# Patient Record
Sex: MALE | Race: WHITE | NOT HISPANIC OR LATINO | Employment: OTHER | URBAN - METROPOLITAN AREA
[De-identification: names, ages, dates, MRNs, and addresses within clinical notes are randomized per-mention and may not be internally consistent; named-entity substitution may affect disease eponyms.]

---

## 2021-07-04 ENCOUNTER — APPOINTMENT (OUTPATIENT)
Dept: GENERAL RADIOLOGY | Facility: HOSPITAL | Age: 61
End: 2021-07-04

## 2021-07-04 ENCOUNTER — HOSPITAL ENCOUNTER (INPATIENT)
Facility: HOSPITAL | Age: 61
LOS: 3 days | Discharge: HOME | End: 2021-07-07
Attending: EMERGENCY MEDICINE | Admitting: INTERNAL MEDICINE

## 2021-07-04 ENCOUNTER — APPOINTMENT (OUTPATIENT)
Dept: CARDIOLOGY | Facility: HOSPITAL | Age: 61
End: 2021-07-04

## 2021-07-04 DIAGNOSIS — I25.119 CORONARY ARTERY DISEASE INVOLVING NATIVE CORONARY ARTERY OF NATIVE HEART WITH ANGINA PECTORIS (HCC): ICD-10-CM

## 2021-07-04 DIAGNOSIS — I21.29 ACUTE LATERAL MYOCARDIAL INFARCTION (HCC): Primary | ICD-10-CM

## 2021-07-04 PROBLEM — F41.9 ANXIETY: Status: ACTIVE | Noted: 2021-07-04

## 2021-07-04 PROBLEM — F43.10 PTSD (POST-TRAUMATIC STRESS DISORDER): Status: ACTIVE | Noted: 2021-07-04

## 2021-07-04 LAB
ACT BLD: 147 SECONDS (ref 89–137)
ACT BLD: 158 SECONDS (ref 89–137)
ACT BLD: 169 SECONDS (ref 89–137)
ACT BLD: 197 SECONDS (ref 89–137)
ACT BLD: 224 SECONDS (ref 89–137)
ALBUMIN SERPL-MCNC: 4.1 G/DL (ref 3.5–5.2)
ALBUMIN/GLOB SERPL: 1.6 G/DL
ALP SERPL-CCNC: 67 U/L (ref 39–117)
ALT SERPL W P-5'-P-CCNC: 13 U/L (ref 1–41)
ANION GAP SERPL CALCULATED.3IONS-SCNC: 7 MMOL/L (ref 5–15)
AST SERPL-CCNC: 18 U/L (ref 1–40)
B PARAPERT DNA SPEC QL NAA+PROBE: NOT DETECTED
B PERT DNA SPEC QL NAA+PROBE: NOT DETECTED
BILIRUB SERPL-MCNC: 0.4 MG/DL (ref 0–1.2)
BUN SERPL-MCNC: 13 MG/DL (ref 6–20)
BUN/CREAT SERPL: 16.3 (ref 7–25)
C PNEUM DNA NPH QL NAA+NON-PROBE: NOT DETECTED
CALCIUM SPEC-SCNC: 8.6 MG/DL (ref 8.6–10.5)
CHLORIDE SERPL-SCNC: 103 MMOL/L (ref 98–107)
CHOLEST SERPL-MCNC: 196 MG/DL (ref 0–200)
CO2 SERPL-SCNC: 26 MMOL/L (ref 22–29)
CREAT SERPL-MCNC: 0.8 MG/DL (ref 0.76–1.27)
DEPRECATED RDW RBC AUTO: 45.1 FL (ref 37–54)
DEPRECATED RDW RBC AUTO: 45.5 FL (ref 37–54)
ERYTHROCYTE [DISTWIDTH] IN BLOOD BY AUTOMATED COUNT: 13.7 % (ref 12.3–15.4)
ERYTHROCYTE [DISTWIDTH] IN BLOOD BY AUTOMATED COUNT: 13.7 % (ref 12.3–15.4)
FLUAV SUBTYP SPEC NAA+PROBE: NOT DETECTED
FLUBV RNA ISLT QL NAA+PROBE: NOT DETECTED
GFR SERPL CREATININE-BSD FRML MDRD: 99 ML/MIN/1.73
GLOBULIN UR ELPH-MCNC: 2.5 GM/DL
GLUCOSE BLDC GLUCOMTR-MCNC: 111 MG/DL (ref 70–105)
GLUCOSE BLDC GLUCOMTR-MCNC: 125 MG/DL (ref 70–105)
GLUCOSE BLDC GLUCOMTR-MCNC: 127 MG/DL (ref 70–105)
GLUCOSE BLDC GLUCOMTR-MCNC: 128 MG/DL (ref 70–105)
GLUCOSE BLDC GLUCOMTR-MCNC: 132 MG/DL (ref 70–105)
GLUCOSE SERPL-MCNC: 136 MG/DL (ref 65–99)
HADV DNA SPEC NAA+PROBE: NOT DETECTED
HCOV 229E RNA SPEC QL NAA+PROBE: NOT DETECTED
HCOV HKU1 RNA SPEC QL NAA+PROBE: NOT DETECTED
HCOV NL63 RNA SPEC QL NAA+PROBE: NOT DETECTED
HCOV OC43 RNA SPEC QL NAA+PROBE: NOT DETECTED
HCT VFR BLD AUTO: 39 % (ref 37.5–51)
HCT VFR BLD AUTO: 40.5 % (ref 37.5–51)
HDLC SERPL-MCNC: 49 MG/DL (ref 40–60)
HGB BLD-MCNC: 13.1 G/DL (ref 13–17.7)
HGB BLD-MCNC: 13.6 G/DL (ref 13–17.7)
HMPV RNA NPH QL NAA+NON-PROBE: NOT DETECTED
HOLD SPECIMEN: NORMAL
HOLD SPECIMEN: NORMAL
HPIV1 RNA SPEC QL NAA+PROBE: NOT DETECTED
HPIV2 RNA SPEC QL NAA+PROBE: NOT DETECTED
HPIV3 RNA NPH QL NAA+PROBE: NOT DETECTED
HPIV4 P GENE NPH QL NAA+PROBE: NOT DETECTED
LDLC SERPL CALC-MCNC: 132 MG/DL (ref 0–100)
LDLC/HDLC SERPL: 2.67 {RATIO}
M PNEUMO IGG SER IA-ACNC: NOT DETECTED
MCH RBC QN AUTO: 31.5 PG (ref 26.6–33)
MCH RBC QN AUTO: 31.8 PG (ref 26.6–33)
MCHC RBC AUTO-ENTMCNC: 33.6 G/DL (ref 31.5–35.7)
MCHC RBC AUTO-ENTMCNC: 33.7 G/DL (ref 31.5–35.7)
MCV RBC AUTO: 93.4 FL (ref 79–97)
MCV RBC AUTO: 94.5 FL (ref 79–97)
MRSA DNA SPEC QL NAA+PROBE: NORMAL
PLATELET # BLD AUTO: 217 10*3/MM3 (ref 140–450)
PLATELET # BLD AUTO: 251 10*3/MM3 (ref 140–450)
PMV BLD AUTO: 7.5 FL (ref 6–12)
PMV BLD AUTO: 7.7 FL (ref 6–12)
POTASSIUM SERPL-SCNC: 4 MMOL/L (ref 3.5–5.2)
PROT SERPL-MCNC: 6.6 G/DL (ref 6–8.5)
QT INTERVAL: 482 MS
RBC # BLD AUTO: 4.18 10*6/MM3 (ref 4.14–5.8)
RBC # BLD AUTO: 4.29 10*6/MM3 (ref 4.14–5.8)
RHINOVIRUS RNA SPEC NAA+PROBE: NOT DETECTED
RSV RNA NPH QL NAA+NON-PROBE: NOT DETECTED
SARS-COV-2 RNA NPH QL NAA+NON-PROBE: NOT DETECTED
SODIUM SERPL-SCNC: 136 MMOL/L (ref 136–145)
TRIGL SERPL-MCNC: 81 MG/DL (ref 0–150)
TROPONIN T SERPL-MCNC: 0.19 NG/ML (ref 0–0.03)
TROPONIN T SERPL-MCNC: 0.61 NG/ML (ref 0–0.03)
TROPONIN T SERPL-MCNC: 1.6 NG/ML (ref 0–0.03)
VLDLC SERPL-MCNC: 15 MG/DL (ref 5–40)
WBC # BLD AUTO: 10.7 10*3/MM3 (ref 3.4–10.8)
WBC # BLD AUTO: 6.5 10*3/MM3 (ref 3.4–10.8)
WHOLE BLOOD HOLD SPECIMEN: NORMAL

## 2021-07-04 PROCEDURE — 93306 TTE W/DOPPLER COMPLETE: CPT | Performed by: INTERNAL MEDICINE

## 2021-07-04 PROCEDURE — C1874 STENT, COATED/COV W/DEL SYS: HCPCS | Performed by: INTERNAL MEDICINE

## 2021-07-04 PROCEDURE — C1894 INTRO/SHEATH, NON-LASER: HCPCS | Performed by: INTERNAL MEDICINE

## 2021-07-04 PROCEDURE — 85347 COAGULATION TIME ACTIVATED: CPT

## 2021-07-04 PROCEDURE — 25010000002 MORPHINE PER 10 MG: Performed by: INTERNAL MEDICINE

## 2021-07-04 PROCEDURE — 92941 PRQ TRLML REVSC TOT OCCL AMI: CPT | Performed by: INTERNAL MEDICINE

## 2021-07-04 PROCEDURE — 25010000002 FENTANYL CITRATE (PF) 50 MCG/ML SOLUTION: Performed by: INTERNAL MEDICINE

## 2021-07-04 PROCEDURE — 71045 X-RAY EXAM CHEST 1 VIEW: CPT

## 2021-07-04 PROCEDURE — 0202U NFCT DS 22 TRGT SARS-COV-2: CPT | Performed by: EMERGENCY MEDICINE

## 2021-07-04 PROCEDURE — 25010000002 HEPARIN (PORCINE) PER 1000 UNITS: Performed by: INTERNAL MEDICINE

## 2021-07-04 PROCEDURE — 25010000002 EPTIFIBATIDE 20 MG/10ML SOLUTION: Performed by: EMERGENCY MEDICINE

## 2021-07-04 PROCEDURE — C1725 CATH, TRANSLUMIN NON-LASER: HCPCS | Performed by: INTERNAL MEDICINE

## 2021-07-04 PROCEDURE — C1887 CATHETER, GUIDING: HCPCS | Performed by: INTERNAL MEDICINE

## 2021-07-04 PROCEDURE — 93458 L HRT ARTERY/VENTRICLE ANGIO: CPT | Performed by: INTERNAL MEDICINE

## 2021-07-04 PROCEDURE — 25010000002 EPTIFIBATIDE PER 5 MG: Performed by: EMERGENCY MEDICINE

## 2021-07-04 PROCEDURE — 80061 LIPID PANEL: CPT | Performed by: INTERNAL MEDICINE

## 2021-07-04 PROCEDURE — 25010000002 FENTANYL CITRATE (PF) 100 MCG/2ML SOLUTION: Performed by: INTERNAL MEDICINE

## 2021-07-04 PROCEDURE — 93005 ELECTROCARDIOGRAM TRACING: CPT | Performed by: INTERNAL MEDICINE

## 2021-07-04 PROCEDURE — 25010000002 FENTANYL CITRATE (PF) 50 MCG/ML SOLUTION: Performed by: EMERGENCY MEDICINE

## 2021-07-04 PROCEDURE — C1769 GUIDE WIRE: HCPCS | Performed by: INTERNAL MEDICINE

## 2021-07-04 PROCEDURE — 87641 MR-STAPH DNA AMP PROBE: CPT | Performed by: NURSE PRACTITIONER

## 2021-07-04 PROCEDURE — 84484 ASSAY OF TROPONIN QUANT: CPT | Performed by: INTERNAL MEDICINE

## 2021-07-04 PROCEDURE — 99254 IP/OBS CNSLTJ NEW/EST MOD 60: CPT | Performed by: INTERNAL MEDICINE

## 2021-07-04 PROCEDURE — 25010000002 MIDAZOLAM PER 1 MG: Performed by: INTERNAL MEDICINE

## 2021-07-04 PROCEDURE — 99152 MOD SED SAME PHYS/QHP 5/>YRS: CPT | Performed by: INTERNAL MEDICINE

## 2021-07-04 PROCEDURE — 3E033PZ INTRODUCTION OF PLATELET INHIBITOR INTO PERIPHERAL VEIN, PERCUTANEOUS APPROACH: ICD-10-PCS | Performed by: INTERNAL MEDICINE

## 2021-07-04 PROCEDURE — C9606 PERC D-E COR REVASC W AMI S: HCPCS | Performed by: INTERNAL MEDICINE

## 2021-07-04 PROCEDURE — 80053 COMPREHEN METABOLIC PANEL: CPT | Performed by: INTERNAL MEDICINE

## 2021-07-04 PROCEDURE — 25010000002 DOPAMINE PER 40 MG: Performed by: INTERNAL MEDICINE

## 2021-07-04 PROCEDURE — 82962 GLUCOSE BLOOD TEST: CPT

## 2021-07-04 PROCEDURE — 93005 ELECTROCARDIOGRAM TRACING: CPT | Performed by: EMERGENCY MEDICINE

## 2021-07-04 PROCEDURE — B2151ZZ FLUOROSCOPY OF LEFT HEART USING LOW OSMOLAR CONTRAST: ICD-10-PCS | Performed by: INTERNAL MEDICINE

## 2021-07-04 PROCEDURE — B2111ZZ FLUOROSCOPY OF MULTIPLE CORONARY ARTERIES USING LOW OSMOLAR CONTRAST: ICD-10-PCS | Performed by: INTERNAL MEDICINE

## 2021-07-04 PROCEDURE — 4A023N7 MEASUREMENT OF CARDIAC SAMPLING AND PRESSURE, LEFT HEART, PERCUTANEOUS APPROACH: ICD-10-PCS | Performed by: INTERNAL MEDICINE

## 2021-07-04 PROCEDURE — 93005 ELECTROCARDIOGRAM TRACING: CPT

## 2021-07-04 PROCEDURE — 93306 TTE W/DOPPLER COMPLETE: CPT

## 2021-07-04 PROCEDURE — 0 IOPAMIDOL PER 1 ML: Performed by: INTERNAL MEDICINE

## 2021-07-04 PROCEDURE — 85027 COMPLETE CBC AUTOMATED: CPT | Performed by: INTERNAL MEDICINE

## 2021-07-04 PROCEDURE — 99284 EMERGENCY DEPT VISIT MOD MDM: CPT

## 2021-07-04 PROCEDURE — 027034Z DILATION OF CORONARY ARTERY, ONE ARTERY WITH DRUG-ELUTING INTRALUMINAL DEVICE, PERCUTANEOUS APPROACH: ICD-10-PCS | Performed by: INTERNAL MEDICINE

## 2021-07-04 PROCEDURE — 93005 ELECTROCARDIOGRAM TRACING: CPT | Performed by: NURSE PRACTITIONER

## 2021-07-04 PROCEDURE — 25010000002 HEPARIN (PORCINE) PER 1000 UNITS: Performed by: EMERGENCY MEDICINE

## 2021-07-04 PROCEDURE — 94799 UNLISTED PULMONARY SVC/PX: CPT

## 2021-07-04 DEVICE — XIENCE SIERRA™ EVEROLIMUS ELUTING CORONARY STENT SYSTEM 2.50 MM X 23 MM / RAPID-EXCHANGE
Type: IMPLANTABLE DEVICE | Status: FUNCTIONAL
Brand: XIENCE SIERRA™

## 2021-07-04 RX ORDER — CLONAZEPAM 0.5 MG/1
0.5 TABLET ORAL 2 TIMES DAILY PRN
Status: DISCONTINUED | OUTPATIENT
Start: 2021-07-04 | End: 2021-07-07 | Stop reason: HOSPADM

## 2021-07-04 RX ORDER — NITROGLYCERIN 20 MG/100ML
INJECTION INTRAVENOUS
Status: COMPLETED | OUTPATIENT
Start: 2021-07-04 | End: 2021-07-04

## 2021-07-04 RX ORDER — MORPHINE SULFATE 4 MG/ML
2 INJECTION, SOLUTION INTRAMUSCULAR; INTRAVENOUS EVERY 4 HOURS PRN
Status: DISCONTINUED | OUTPATIENT
Start: 2021-07-04 | End: 2021-07-04

## 2021-07-04 RX ORDER — MIDAZOLAM HYDROCHLORIDE 1 MG/ML
INJECTION INTRAMUSCULAR; INTRAVENOUS AS NEEDED
Status: DISCONTINUED | OUTPATIENT
Start: 2021-07-04 | End: 2021-07-04 | Stop reason: HOSPADM

## 2021-07-04 RX ORDER — LIDOCAINE HYDROCHLORIDE 20 MG/ML
INJECTION, SOLUTION INFILTRATION; PERINEURAL AS NEEDED
Status: DISCONTINUED | OUTPATIENT
Start: 2021-07-04 | End: 2021-07-04 | Stop reason: HOSPADM

## 2021-07-04 RX ORDER — ASPIRIN 81 MG/1
81 TABLET ORAL DAILY
Status: DISCONTINUED | OUTPATIENT
Start: 2021-07-04 | End: 2021-07-07 | Stop reason: HOSPADM

## 2021-07-04 RX ORDER — NITROGLYCERIN 5 MG/ML
INJECTION, SOLUTION INTRAVENOUS AS NEEDED
Status: DISCONTINUED | OUTPATIENT
Start: 2021-07-04 | End: 2021-07-04 | Stop reason: HOSPADM

## 2021-07-04 RX ORDER — ONDANSETRON 4 MG/1
4 TABLET, FILM COATED ORAL EVERY 6 HOURS PRN
Status: DISCONTINUED | OUTPATIENT
Start: 2021-07-04 | End: 2021-07-07 | Stop reason: HOSPADM

## 2021-07-04 RX ORDER — CLOPIDOGREL BISULFATE 75 MG/1
75 TABLET ORAL DAILY
Status: DISCONTINUED | OUTPATIENT
Start: 2021-07-05 | End: 2021-07-07 | Stop reason: HOSPADM

## 2021-07-04 RX ORDER — MORPHINE SULFATE 4 MG/ML
1 INJECTION, SOLUTION INTRAMUSCULAR; INTRAVENOUS EVERY 4 HOURS PRN
Status: DISCONTINUED | OUTPATIENT
Start: 2021-07-04 | End: 2021-07-07 | Stop reason: HOSPADM

## 2021-07-04 RX ORDER — SODIUM CHLORIDE 0.9 % (FLUSH) 0.9 %
10 SYRINGE (ML) INJECTION AS NEEDED
Status: DISCONTINUED | OUTPATIENT
Start: 2021-07-04 | End: 2021-07-07 | Stop reason: HOSPADM

## 2021-07-04 RX ORDER — ESCITALOPRAM OXALATE 20 MG/1
20 TABLET ORAL DAILY
COMMUNITY
End: 2021-11-18

## 2021-07-04 RX ORDER — ALUMINA, MAGNESIA, AND SIMETHICONE 2400; 2400; 240 MG/30ML; MG/30ML; MG/30ML
15 SUSPENSION ORAL EVERY 6 HOURS PRN
Status: DISCONTINUED | OUTPATIENT
Start: 2021-07-04 | End: 2021-07-07 | Stop reason: HOSPADM

## 2021-07-04 RX ORDER — DOPAMINE HYDROCHLORIDE 160 MG/100ML
INJECTION, SOLUTION INTRAVENOUS CONTINUOUS PRN
Status: COMPLETED | OUTPATIENT
Start: 2021-07-04 | End: 2021-07-04

## 2021-07-04 RX ORDER — ONDANSETRON 2 MG/ML
4 INJECTION INTRAMUSCULAR; INTRAVENOUS EVERY 6 HOURS PRN
Status: DISCONTINUED | OUTPATIENT
Start: 2021-07-04 | End: 2021-07-04 | Stop reason: SDUPTHER

## 2021-07-04 RX ORDER — ASPIRIN 325 MG
TABLET ORAL AS NEEDED
Status: DISCONTINUED | OUTPATIENT
Start: 2021-07-04 | End: 2021-07-04 | Stop reason: HOSPADM

## 2021-07-04 RX ORDER — MORPHINE SULFATE 4 MG/ML
2 INJECTION, SOLUTION INTRAMUSCULAR; INTRAVENOUS EVERY 4 HOURS PRN
Status: DISCONTINUED | OUTPATIENT
Start: 2021-07-04 | End: 2021-07-07 | Stop reason: HOSPADM

## 2021-07-04 RX ORDER — EPTIFIBATIDE 20 MG/10ML
INJECTION INTRAVENOUS
Status: COMPLETED | OUTPATIENT
Start: 2021-07-04 | End: 2021-07-04

## 2021-07-04 RX ORDER — ESCITALOPRAM OXALATE 10 MG/1
20 TABLET ORAL DAILY
Status: DISCONTINUED | OUTPATIENT
Start: 2021-07-04 | End: 2021-07-07 | Stop reason: HOSPADM

## 2021-07-04 RX ORDER — ACETAMINOPHEN 650 MG/1
650 SUPPOSITORY RECTAL EVERY 4 HOURS PRN
Status: DISCONTINUED | OUTPATIENT
Start: 2021-07-04 | End: 2021-07-07 | Stop reason: HOSPADM

## 2021-07-04 RX ORDER — FENTANYL CITRATE 50 UG/ML
INJECTION, SOLUTION INTRAMUSCULAR; INTRAVENOUS AS NEEDED
Status: DISCONTINUED | OUTPATIENT
Start: 2021-07-04 | End: 2021-07-04 | Stop reason: HOSPADM

## 2021-07-04 RX ORDER — ACETAMINOPHEN 325 MG/1
650 TABLET ORAL EVERY 4 HOURS PRN
Status: DISCONTINUED | OUTPATIENT
Start: 2021-07-04 | End: 2021-07-07 | Stop reason: HOSPADM

## 2021-07-04 RX ORDER — EPTIFIBATIDE 0.75 MG/ML
INJECTION, SOLUTION INTRAVENOUS
Status: COMPLETED | OUTPATIENT
Start: 2021-07-04 | End: 2021-07-04

## 2021-07-04 RX ORDER — FENTANYL CITRATE 50 UG/ML
25 INJECTION, SOLUTION INTRAMUSCULAR; INTRAVENOUS
Status: DISCONTINUED | OUTPATIENT
Start: 2021-07-04 | End: 2021-07-07 | Stop reason: HOSPADM

## 2021-07-04 RX ORDER — SODIUM CHLORIDE 9 MG/ML
250 INJECTION, SOLUTION INTRAVENOUS ONCE AS NEEDED
Status: DISCONTINUED | OUTPATIENT
Start: 2021-07-04 | End: 2021-07-07 | Stop reason: HOSPADM

## 2021-07-04 RX ORDER — FENTANYL CITRATE 50 UG/ML
50 INJECTION, SOLUTION INTRAMUSCULAR; INTRAVENOUS ONCE
Status: COMPLETED | OUTPATIENT
Start: 2021-07-04 | End: 2021-07-04

## 2021-07-04 RX ORDER — ONDANSETRON 2 MG/ML
4 INJECTION INTRAMUSCULAR; INTRAVENOUS EVERY 6 HOURS PRN
Status: DISCONTINUED | OUTPATIENT
Start: 2021-07-04 | End: 2021-07-07 | Stop reason: HOSPADM

## 2021-07-04 RX ORDER — HEPARIN SODIUM 5000 [USP'U]/ML
INJECTION, SOLUTION INTRAVENOUS; SUBCUTANEOUS
Status: COMPLETED | OUTPATIENT
Start: 2021-07-04 | End: 2021-07-04

## 2021-07-04 RX ORDER — SODIUM CHLORIDE 0.9 % (FLUSH) 0.9 %
10 SYRINGE (ML) INJECTION EVERY 12 HOURS SCHEDULED
Status: DISCONTINUED | OUTPATIENT
Start: 2021-07-04 | End: 2021-07-07 | Stop reason: HOSPADM

## 2021-07-04 RX ORDER — HEPARIN SODIUM 1000 [USP'U]/ML
INJECTION, SOLUTION INTRAVENOUS; SUBCUTANEOUS AS NEEDED
Status: DISCONTINUED | OUTPATIENT
Start: 2021-07-04 | End: 2021-07-04 | Stop reason: HOSPADM

## 2021-07-04 RX ORDER — CLOPIDOGREL BISULFATE 75 MG/1
TABLET ORAL AS NEEDED
Status: DISCONTINUED | OUTPATIENT
Start: 2021-07-04 | End: 2021-07-04 | Stop reason: HOSPADM

## 2021-07-04 RX ORDER — ONDANSETRON 4 MG/1
4 TABLET, FILM COATED ORAL EVERY 6 HOURS PRN
Status: DISCONTINUED | OUTPATIENT
Start: 2021-07-04 | End: 2021-07-04 | Stop reason: SDUPTHER

## 2021-07-04 RX ORDER — CLONAZEPAM 0.5 MG/1
0.5 TABLET ORAL 2 TIMES DAILY PRN
COMMUNITY
End: 2021-07-14

## 2021-07-04 RX ADMIN — FENTANYL CITRATE 50 MCG: 50 INJECTION, SOLUTION INTRAMUSCULAR; INTRAVENOUS at 01:09

## 2021-07-04 RX ADMIN — ACETAMINOPHEN 650 MG: 325 TABLET, FILM COATED ORAL at 21:43

## 2021-07-04 RX ADMIN — ASPIRIN 81 MG: 81 TABLET, COATED ORAL at 10:11

## 2021-07-04 RX ADMIN — FENTANYL CITRATE 25 MCG: 50 INJECTION INTRAMUSCULAR; INTRAVENOUS at 15:17

## 2021-07-04 RX ADMIN — MORPHINE SULFATE 2 MG: 4 INJECTION INTRAVENOUS at 18:14

## 2021-07-04 RX ADMIN — MAGNESIUM HYDROXIDE,ALUMINUM HYDROXICE,SIMETHICONE 15 ML: 240; 2400; 2400 SUSPENSION ORAL at 07:28

## 2021-07-04 RX ADMIN — MORPHINE SULFATE 2 MG: 4 INJECTION INTRAVENOUS at 22:12

## 2021-07-04 RX ADMIN — FENTANYL CITRATE 25 MCG: 50 INJECTION INTRAMUSCULAR; INTRAVENOUS at 04:21

## 2021-07-04 RX ADMIN — MORPHINE SULFATE 2 MG: 4 INJECTION INTRAVENOUS at 07:22

## 2021-07-04 RX ADMIN — HEPARIN SODIUM 5000 UNITS: 5000 INJECTION INTRAVENOUS; SUBCUTANEOUS at 01:00

## 2021-07-04 RX ADMIN — FENTANYL CITRATE 25 MCG: 50 INJECTION INTRAMUSCULAR; INTRAVENOUS at 10:28

## 2021-07-04 RX ADMIN — NITROGLYCERIN 5 MCG/MIN: 20 INJECTION INTRAVENOUS at 00:56

## 2021-07-04 RX ADMIN — EPTIFIBATIDE 15516 MCG: 2 INJECTION, SOLUTION INTRAVENOUS at 01:11

## 2021-07-04 RX ADMIN — Medication 10 ML: at 10:28

## 2021-07-04 RX ADMIN — EPTIFIBATIDE 2 MCG/KG/MIN: 0.75 INJECTION INTRAVENOUS at 01:11

## 2021-07-04 RX ADMIN — ESCITALOPRAM OXALATE 20 MG: 10 TABLET ORAL at 10:11

## 2021-07-04 RX ADMIN — Medication 10 ML: at 21:42

## 2021-07-04 RX ADMIN — MORPHINE SULFATE 2 MG: 4 INJECTION INTRAVENOUS at 11:55

## 2021-07-04 RX ADMIN — SODIUM CHLORIDE 500 ML: 0.9 INJECTION, SOLUTION INTRAVENOUS at 01:06

## 2021-07-04 NOTE — H&P
PULMONARY/CRITICAL CARE HISTORY & PHYSICAL       PATIENT NAME:     Brayden Rainey  :     1960    MRN:     [id number]       ROOM:     [facility]     PRIMARY CARE PHYSICIAN:  Provider, No Known    SUBJECTIVE     CHIEF COMPLAINT:   Chest Pain    HISTORY OF PRESENT ILLNESS:  Brayden Rainey is a 60 y.o. male  has a past medical history of Anxiety (2021) and PTSD (post-traumatic stress disorder) (2021).   Patient presented to [facility] on 2021 with complaint of chest pain.  Patient stated that he was at [employer] earlier this evening, and had sudden onset of chest pain that radiated down his left arm and up his neck and jaw.  Patient was diaphoretic at the time.  Patient described his pain as crushing, with no precipitating or alleviating factors.  EMS was called, and EKG obtained showed acute STEMI.  Patient was brought to [facility] ER and Cath Lab was activated.  Patient was found to have an acute anterior lateral myocardial infarction.  Cardiac cath by Dr. Lai, PCI to LAD with drug-eluting stent completed, stenosis was decreased from 80 to 90% to less than 10%.  Patient had 90-95% occlusion of LAD and 70-80% occlusion of RCA.  Per Dr. Lai there were small diagonal branches at the origin of lesion of the LAD, that were not able to open.  EF was found to be 45%.  Patient became bradycardic during cardiac catheterization and given atropine and started on dopamine.     Patient was admitted to ICU after cardiac cath, arterial sheath was still in place at the time.  Patient denies shortness of air, palpitations, dizziness or syncope, headache, chills, or fever.  Denies abdominal pain, nausea, vomiting, diarrhea, constipation, loss of weight or loss of appetite, dysuria, blood in urine or stool.    CBC was obtained in ED which was unremarkable.    Pulmonary/Intensivist service was contacted for admission to ICU and further evaluation and treatment.      REVIEW OF  "SYSTEMS:  Pertinent items are noted in HPI, all other systems reviewed and negative      ASSESSMENT & PLAN     Principal Problem:    Acute lateral myocardial infarction (CMS/HCC)  Active Problems:    Anxiety    PTSD (post-traumatic stress disorder)     PLAN:  Acute lateral myocardial infarction  -S/p cardiac catheterization with Dr. Lai  -Arterial sheath still in place  -Monitor in ICU  -Reported bradycardia and cardiac Cath Lab  -On dopamine gtt  -Morphine and fentanyl for pain, per cardiology  -Started on aspirin and Plavix  -Started on metoprolol     Anxiety  PTSD  -Continue home Lexapro  -Continue home dose as needed Klonopin    Code Status: Full  VTE Prophylaxis: SCDs  PUD Prophylaxis: [facility] MEDICATIONS     SCHEDULED MEDICATIONS:  aspirin, 81 mg, Oral, Daily  atropine sulfate, , ,   [START ON 7/5/2021] clopidogrel, 75 mg, Oral, Daily  escitalopram, 20 mg, Oral, Daily  metoprolol tartrate, 12.5 mg, Oral, Q12H  sodium chloride, 10 mL, Intravenous, Q12H         CONTINUOUS INFUSIONS:          PRN MEDICATIONS:   •  acetaminophen **OR** acetaminophen  •  acetaminophen  •  aluminum-magnesium hydroxide-simethicone  •  atropine  •  clonazePAM  •  fentaNYL citrate (PF)  •  morphine **OR** Morphine  •  ondansetron **OR** ondansetron  •  ondansetron **OR** ondansetron  •  sodium chloride  •  sodium chloride  •  sodium chloride       OBJECTIVE     VITAL SIGNS:  /86   Pulse 55   Temp 97.7 °F (36.5 °C)   Resp 18   Ht 170.2 cm (67\")   Wt 86.2 kg (190 lb)   SpO2 100%   BMI 29.76 kg/m²     Wt Readings from Last 3 Encounters:   07/04/21 86.2 kg (190 lb)       INTAKE/OUTPUT:  No intake or output data in the 24 hours ending 07/04/21 0246    PHYSICAL EXAM:   Constitutional:  Well developed, well nourished, no acute distress, non-toxic appearance   Eyes:  PERRL, conjunctiva normal, EOMI   HENT:  Atraumatic, external ears normal, nose normal, oropharynx moist, no pharyngeal exudates. Neck-normal range of " motion, no tenderness  Respiratory: Clear to ascultation bilateral, non-labored respirations without accessory muscle use  Cardiovascular:  Normal rate, normal rhythm, no murmurs, no gallops, no rubs   GI:  Soft, nondistended, normal bowel sounds, nontender, no rebound or guarding   :  No costovertebral angle tenderness   Musculoskeletal:  No edema, no tenderness, no deformities, right arterial sheath noted  Integument:  Well hydrated, no rash   Neurologic:  Alert & oriented x 3, normal motor function, normal sensory function, no gross motor deficits noted   Psychiatric:  Speech and behavior appropriate    HISTORY     HISTORY:  No past medical history on file.  No past surgical history on file.  No family history on file.  Social History     Socioeconomic History   • Marital status: Single     Spouse name: Not on file   • Number of children: Not on file   • Years of education: Not on file   • Highest education level: Not on file        HOME MEDICATIONS:   Prior to Admission medications    Medication Sig Start Date End Date Taking? Authorizing Provider   clonazePAM (KlonoPIN) 0.5 MG tablet Take 0.5 mg by mouth 2 (Two) Times a Day As Needed.   Yes ProviderVicente MD   escitalopram (LEXAPRO) 20 MG tablet Take 20 mg by mouth Daily.   Yes ProviderVicente MD       IMMUNIZATIONS:    There is no immunization history on file for this patient.     ALLERGIES:  Patient has no known allergies.      RESULTS     LABS:  Lab Results (last 24 hours)     Procedure Component Value Units Date/Time    CBC (No Diff) [[id number]]  (Normal) Collected: 07/04/21 0104    Specimen: Blood Updated: 07/04/21 0235     WBC 6.50 10*3/mm3      RBC 4.29 10*6/mm3      Hemoglobin 13.6 g/dL      Hematocrit 40.5 %      MCV 94.5 fL      MCH 31.8 pg      MCHC 33.6 g/dL      RDW 13.7 %      RDW-SD 45.5 fl      MPV 7.7 fL      Platelets 251 10*3/mm3     COVID PRE-OP / PRE-PROCEDURE SCREENING ORDER (NO ISOLATION) - Swab, Nasopharynx [[id number]]   (Normal) Collected: 07/04/21 0109    Specimen: Swab from Nasopharynx Updated: 07/04/21 0208    Narrative:      The following orders were created for panel order COVID PRE-OP / PRE-PROCEDURE SCREENING ORDER (NO ISOLATION) - Swab, Nasopharynx.  Procedure                               Abnormality         Status                     ---------                               -----------         ------                     Respiratory Panel PCR w/...[[id number]]  Normal              Final result                 Please view results for these tests on the individual orders.    Respiratory Panel PCR w/COVID-19(SARS-CoV-2) JD/HAO/BLAINE/PAD/COR/MAD/ROGELIO In-House, NP Swab in UTM/VTM, 3-4 HR TAT - Swab, Nasopharynx [[id number]]  (Normal) Collected: 07/04/21 0109    Specimen: Swab from Nasopharynx Updated: 07/04/21 0208     ADENOVIRUS, PCR Not Detected     Coronavirus 229E Not Detected     Coronavirus HKU1 Not Detected     Coronavirus NL63 Not Detected     Coronavirus OC43 Not Detected     COVID19 Not Detected     Human Metapneumovirus Not Detected     Human Rhinovirus/Enterovirus Not Detected     Influenza A PCR Not Detected     Influenza B PCR Not Detected     Parainfluenza Virus 1 Not Detected     Parainfluenza Virus 2 Not Detected     Parainfluenza Virus 3 Not Detected     Parainfluenza Virus 4 Not Detected     RSV, PCR Not Detected     Bordetella pertussis pcr Not Detected     Bordetella parapertussis PCR Not Detected     Chlamydophila pneumoniae PCR Not Detected     Mycoplasma pneumo by PCR Not Detected    Narrative:      In the setting of a positive respiratory panel with a viral infection PLUS a negative procalcitonin without other underlying concern for bacterial infection, consider observing off antibiotics or discontinuation of antibiotics and continue supportive care. If the respiratory panel is positive for atypical bacterial infection (Bordetella pertussis, Chlamydophila pneumoniae, or Mycoplasma pneumoniae), consider  antibiotic de-escalation to target atypical bacterial infection.    POC Activated Clotting Time [[id number]]  (Abnormal) Collected: 07/04/21 0138    Specimen: Arterial Blood Updated: 07/04/21 0242     Activated Clotting Time  147 Seconds      Comment: Serial Number: [id number]:  [id number]       POC Activated Clotting Time [[id number]]  (Abnormal) Collected: 07/04/21 0158    Specimen: Arterial Blood Updated: 07/04/21 0243     Activated Clotting Time  224 Seconds      Comment: Serial Number: [id number]:  [id number]       POC Activated Clotting Time [[id number]]  (Abnormal) Collected: 07/04/21 0210    Specimen: Arterial Blood Updated: 07/04/21 0243     Activated Clotting Time  197 Seconds      Comment: Serial Number: [id number]:  [id number]               MICRO:  Microbiology Results (last 10 days)     Procedure Component Value - Date/Time    COVID PRE-OP / PRE-PROCEDURE SCREENING ORDER (NO ISOLATION) - Swab, Nasopharynx [[id number]]  (Normal) Collected: 07/04/21 0109    Lab Status: Final result Specimen: Swab from Nasopharynx Updated: 07/04/21 0208    Narrative:      The following orders were created for panel order COVID PRE-OP / PRE-PROCEDURE SCREENING ORDER (NO ISOLATION) - Swab, Nasopharynx.  Procedure                               Abnormality         Status                     ---------                               -----------         ------                     Respiratory Panel PCR w/...[[id number]]  Normal              Final result                 Please view results for these tests on the individual orders.    Respiratory Panel PCR w/COVID-19(SARS-CoV-2) JD/HAO/BLAINE/PAD/COR/MAD/ROGELIO In-House, NP Swab in UTM/VTM, 3-4 HR TAT - Swab, Nasopharynx [[id number]]  (Normal) Collected: 07/04/21 0109    Lab Status: Final result Specimen: Swab from Nasopharynx Updated: 07/04/21 0208     ADENOVIRUS, PCR Not Detected     Coronavirus 229E Not Detected     Coronavirus HKU1 Not Detected     Coronavirus NL63 Not Detected      Coronavirus OC43 Not Detected     COVID19 Not Detected     Human Metapneumovirus Not Detected     Human Rhinovirus/Enterovirus Not Detected     Influenza A PCR Not Detected     Influenza B PCR Not Detected     Parainfluenza Virus 1 Not Detected     Parainfluenza Virus 2 Not Detected     Parainfluenza Virus 3 Not Detected     Parainfluenza Virus 4 Not Detected     RSV, PCR Not Detected     Bordetella pertussis pcr Not Detected     Bordetella parapertussis PCR Not Detected     Chlamydophila pneumoniae PCR Not Detected     Mycoplasma pneumo by PCR Not Detected    Narrative:      In the setting of a positive respiratory panel with a viral infection PLUS a negative procalcitonin without other underlying concern for bacterial infection, consider observing off antibiotics or discontinuation of antibiotics and continue supportive care. If the respiratory panel is positive for atypical bacterial infection (Bordetella pertussis, Chlamydophila pneumoniae, or Mycoplasma pneumoniae), consider antibiotic de-escalation to target atypical bacterial infection.            RADIOLOGY STUDIES:  Imaging Results (Last 72 Hours)     ** No results found for the last 72 hours. **              I reviewed the patient's new clinical results.    I discussed the patient's findings and my recommendations with patient, nursing staff and consulting provider.  I have discussed plan with on-call attending physician, who agrees with this plan of care.    Appropriate PPE worn during assessment of patient per established guidelines.      Electronically signed by FRANCIA Lin, 07/04/21, 2:46 AM EDT.

## 2021-07-04 NOTE — CONSULTS
Referring Provider: Unknown    Attending: Virginia Kumar MD    Reason for Consultation:    Acute anterior and lateral myocardial infarction    Chief complaint:    Chest pain       History of present illness:     Patient is 60-year-old white gentleman whose past medical history is significant for anxiety disorder who is admitted with acute anterior and lateral myocardial infarction    Patient report that he was in [employer] having prayers.  He left the Judaism and was going to the parking lot when he started having chest pain.  Patient was taken to [facility] and he was noted to have ST elevation myocardial infarction involving the lateral leads.  There were reciprocal changes in inferior leads.  Patient was transferred to [facility] for further recommendation.    Patient did not have a previous history of CAD or PCI or MI.  Patient does not have diabetes mellitus.  No hypertension.  He does not smoke or abuse alcohol.    Review of Systems  Review of Systems   Constitutional: Negative for chills and fever.   HENT: Negative for ear discharge and nosebleeds.    Eyes: Negative for discharge and redness.   Cardiovascular: Positive for chest pain. Negative for orthopnea, palpitations, paroxysmal nocturnal dyspnea and syncope.   Respiratory: Positive for shortness of breath. Negative for cough and wheezing.    Endocrine: Negative for heat intolerance.   Skin: Negative for rash.   Musculoskeletal: Negative for arthritis and myalgias.   Gastrointestinal: Negative for abdominal pain, melena, nausea and vomiting.   Genitourinary: Negative for dysuria and hematuria.   Neurological: Negative for dizziness, light-headedness, numbness and tremors.   Psychiatric/Behavioral: Negative for depression. The patient is nervous/anxious.        Past Medical History  Past Medical History:   Diagnosis Date   • Anxiety 7/4/2021   • PTSD (post-traumatic stress disorder) 7/4/2021    and History reviewed. No pertinent  "surgical history.    Family History  History reviewed. No pertinent family history.    Social History  Social History     Socioeconomic History   • Marital status: Single     Spouse name: Not on file   • Number of children: Not on file   • Years of education: Not on file   • Highest education level: Not on file   Tobacco Use   • Smoking status: Never Smoker   • Smokeless tobacco: Never Used   Substance and Sexual Activity   • Alcohol use: Not Currently   • Drug use: Never       Objective     Physical Exam:    Vital Signs  Vitals:    07/04/21 1155 07/04/21 1200 07/04/21 1205 07/04/21 1235   BP: 127/76  122/76 133/77   Pulse: (!) 47  58 52   Resp:       Temp:  98 °F (36.7 °C)     TempSrc:  Oral     SpO2: 100%  96% 98%   Weight:       Height:           Weight  Flowsheet Rows      First Filed Value   Admission Height  170.2 cm (67\") Documented at 07/04/2021 0051   Admission Weight  86.2 kg (190 lb) Documented at 07/04/2021 0051           Constitutional:       Appearance: Well-developed.   Eyes:      General: No scleral icterus.        Right eye: No discharge.   HENT:      Head: Normocephalic and atraumatic.   Neck:      Thyroid: No thyromegaly.      Lymphadenopathy: No cervical adenopathy.   Pulmonary:      Effort: Pulmonary effort is normal. No respiratory distress.      Breath sounds: Normal breath sounds. No wheezing. No rales.   Cardiovascular:      Normal rate. Regular rhythm.      No gallop.   Abdominal:      Tenderness: There is no abdominal tenderness.   Skin:     Findings: No erythema or rash.   Neurological:      Mental Status: Alert and oriented to person, place, and time.         Results Review:  Lab Results (last 24 hours)     Procedure Component Value Units Date/Time    Troponin [[id number]]  (Abnormal) Collected: 07/04/21 1208    Specimen: Blood Updated: 07/04/21 1239     Troponin T 0.611 ng/mL     Narrative:      Troponin T Reference Range:  <= 0.03 ng/mL-   Negative for AMI  >0.03 ng/mL-     Abnormal for " myocardial necrosis.  Clinicians would have to utilize clinical acumen, EKG, Troponin and serial changes to determine if it is an Acute Myocardial Infarction or myocardial injury due to an underlying chronic condition.       Results may be falsely decreased if patient taking Biotin.      POC Glucose Once [[id number]]  (Abnormal) Collected: 07/04/21 1228    Specimen: Blood Updated: 07/04/21 1229     Glucose 128 mg/dL      Comment: Serial Number: [id number]:  [id number]       POC Glucose Once [[id number]]  (Abnormal) Collected: 07/04/21 0808    Specimen: Blood Updated: 07/04/21 0809     Glucose 132 mg/dL      Comment: Serial Number: [id number]:  [id number]       MRSA Screen, PCR (Inpatient) - Swab, Nares [[id number]]  (Normal) Collected: 07/04/21 0602    Specimen: Swab from Nares Updated: 07/04/21 0746     MRSA PCR No MRSA Detected    CBC (No Diff) [[id number]]  (Normal) Collected: 07/04/21 0419    Specimen: Blood Updated: 07/04/21 0453     WBC 10.70 10*3/mm3      Comment: Result checked         RBC 4.18 10*6/mm3      Hemoglobin 13.1 g/dL      Hematocrit 39.0 %      MCV 93.4 fL      MCH 31.5 pg      MCHC 33.7 g/dL      RDW 13.7 %      RDW-SD 45.1 fl      MPV 7.5 fL      Platelets 217 10*3/mm3     Troponin [[id number]]  (Abnormal) Collected: 07/04/21 0419    Specimen: Blood Updated: 07/04/21 0453     Troponin T 0.188 ng/mL     Narrative:      Troponin T Reference Range:  <= 0.03 ng/mL-   Negative for AMI  >0.03 ng/mL-     Abnormal for myocardial necrosis.  Clinicians would have to utilize clinical acumen, EKG, Troponin and serial changes to determine if it is an Acute Myocardial Infarction or myocardial injury due to an underlying chronic condition.       Results may be falsely decreased if patient taking Biotin.      Lipid Panel [[id number]]  (Abnormal) Collected: 07/04/21 0419    Specimen: Blood Updated: 07/04/21 0451     Total Cholesterol 196 mg/dL      Triglycerides 81 mg/dL      HDL Cholesterol 49 mg/dL       LDL Cholesterol  132 mg/dL      VLDL Cholesterol 15 mg/dL      LDL/HDL Ratio 2.67    Narrative:      Cholesterol Reference Ranges  (U.S. Department of Health and Human Services ATP III Classifications)    Desirable          <200 mg/dL  Borderline High    200-239 mg/dL  High Risk          >240 mg/dL      Triglyceride Reference Ranges  (U.S. Department of Health and Human Services ATP III Classifications)    Normal           <150 mg/dL  Borderline High  150-199 mg/dL  High             200-499 mg/dL  Very High        >500 mg/dL    HDL Reference Ranges  (U.S. Department of Health and Human Services ATP III Classifcations)    Low     <40 mg/dl (major risk factor for CHD)  High    >60 mg/dl ('negative' risk factor for CHD)        LDL Reference Ranges  (U.S. Department of Health and Human Services ATP III Classifcations)    Optimal          <100 mg/dL  Near Optimal     100-129 mg/dL  Borderline High  130-159 mg/dL  High             160-189 mg/dL  Very High        >189 mg/dL    Comprehensive Metabolic Panel [[id number]]  (Abnormal) Collected: 07/04/21 0419    Specimen: Blood Updated: 07/04/21 0451     Glucose 136 mg/dL      BUN 13 mg/dL      Creatinine 0.80 mg/dL      Sodium 136 mmol/L      Potassium 4.0 mmol/L      Chloride 103 mmol/L      CO2 26.0 mmol/L      Calcium 8.6 mg/dL      Total Protein 6.6 g/dL      Albumin 4.10 g/dL      ALT (SGPT) 13 U/L      AST (SGOT) 18 U/L      Alkaline Phosphatase 67 U/L      Total Bilirubin 0.4 mg/dL      eGFR Non African Amer 99 mL/min/1.73      Globulin 2.5 gm/dL      A/G Ratio 1.6 g/dL      BUN/Creatinine Ratio 16.3     Anion Gap 7.0 mmol/L     Narrative:      GFR Normal >60  Chronic Kidney Disease <60  Kidney Failure <15      POC Activated Clotting Time [[id number]]  (Abnormal) Collected: 07/04/21 0416    Specimen: Arterial Blood Updated: 07/04/21 0421     Activated Clotting Time  158 Seconds      Comment: Serial Number: [id number]:  [id number]       POC Activated Clotting Time  [[id number]]  (Abnormal) Collected: 07/04/21 0319    Specimen: Arterial Blood Updated: 07/04/21 0421     Activated Clotting Time  169 Seconds      Comment: Serial Number: [id number]:  [id number]       POC Glucose Once [[id number]]  (Abnormal) Collected: 07/04/21 0304    Specimen: Blood Updated: 07/04/21 0304     Glucose 111 mg/dL      Comment: Serial Number: [id number]:  [id number]       POC Activated Clotting Time [[id number]]  (Abnormal) Collected: 07/04/21 0210    Specimen: Arterial Blood Updated: 07/04/21 0243     Activated Clotting Time  197 Seconds      Comment: Serial Number: [id number]:  [id number]       POC Activated Clotting Time [[id number]]  (Abnormal) Collected: 07/04/21 0158    Specimen: Arterial Blood Updated: 07/04/21 0243     Activated Clotting Time  224 Seconds      Comment: Serial Number: [id number]:  [id number]       POC Activated Clotting Time [[id number]]  (Abnormal) Collected: 07/04/21 0138    Specimen: Arterial Blood Updated: 07/04/21 0242     Activated Clotting Time  147 Seconds      Comment: Serial Number: [id number]:  [id number]       CBC (No Diff) [[id number]]  (Normal) Collected: 07/04/21 0104    Specimen: Blood Updated: 07/04/21 0235     WBC 6.50 10*3/mm3      RBC 4.29 10*6/mm3      Hemoglobin 13.6 g/dL      Hematocrit 40.5 %      MCV 94.5 fL      MCH 31.8 pg      MCHC 33.6 g/dL      RDW 13.7 %      RDW-SD 45.5 fl      MPV 7.7 fL      Platelets 251 10*3/mm3     [place] Draw [[id number]] Collected: 07/04/21 0104    Specimen: Blood Updated: 07/04/21 0215    Narrative:      The following orders were created for panel order [place] Draw.  Procedure                               Abnormality         Status                     ---------                               -----------         ------                     Green Top (Gel)[[id number]]                                  Final result               Lavender Top[[id number]]                                     Final result               Gold Top -  SST[[id number]]                                   Final result                 Please view results for these tests on the individual orders.    Lavender Top [[id number]] Collected: 07/04/21 0104    Specimen: Blood Updated: 07/04/21 0215     Extra Tube hold for add-on     Comment: Auto resulted       Green Top (Gel) [[id number]] Collected: 07/04/21 0104    Specimen: Blood Updated: 07/04/21 0215     Extra Tube Hold for add-ons.     Comment: Auto resulted.       Gold Top - SST [[id number]] Collected: 07/04/21 0104    Specimen: Blood Updated: 07/04/21 0215     Extra Tube Hold for add-ons.     Comment: Auto resulted.       COVID PRE-OP / PRE-PROCEDURE SCREENING ORDER (NO ISOLATION) - Swab, Nasopharynx [[id number]]  (Normal) Collected: 07/04/21 0109    Specimen: Swab from Nasopharynx Updated: 07/04/21 0208    Narrative:      The following orders were created for panel order COVID PRE-OP / PRE-PROCEDURE SCREENING ORDER (NO ISOLATION) - Swab, Nasopharynx.  Procedure                               Abnormality         Status                     ---------                               -----------         ------                     Respiratory Panel PCR w/...[[id number]]  Normal              Final result                 Please view results for these tests on the individual orders.    Respiratory Panel PCR w/COVID-19(SARS-CoV-2) JD/HAO/BLAINE/PAD/COR/MAD/ROGELIO In-House, NP Swab in UTM/VTM, 3-4 HR TAT - Swab, Nasopharynx [[id number]]  (Normal) Collected: 07/04/21 0109    Specimen: Swab from Nasopharynx Updated: 07/04/21 0208     ADENOVIRUS, PCR Not Detected     Coronavirus 229E Not Detected     Coronavirus HKU1 Not Detected     Coronavirus NL63 Not Detected     Coronavirus OC43 Not Detected     COVID19 Not Detected     Human Metapneumovirus Not Detected     Human Rhinovirus/Enterovirus Not Detected     Influenza A PCR Not Detected     Influenza B PCR Not Detected     Parainfluenza Virus 1 Not Detected     Parainfluenza Virus 2 Not Detected      Parainfluenza Virus 3 Not Detected     Parainfluenza Virus 4 Not Detected     RSV, PCR Not Detected     Bordetella pertussis pcr Not Detected     Bordetella parapertussis PCR Not Detected     Chlamydophila pneumoniae PCR Not Detected     Mycoplasma pneumo by PCR Not Detected    Narrative:      In the setting of a positive respiratory panel with a viral infection PLUS a negative procalcitonin without other underlying concern for bacterial infection, consider observing off antibiotics or discontinuation of antibiotics and continue supportive care. If the respiratory panel is positive for atypical bacterial infection (Bordetella pertussis, Chlamydophila pneumoniae, or Mycoplasma pneumoniae), consider antibiotic de-escalation to target atypical bacterial infection.        Imaging Results (Last 72 Hours)     ** No results found for the last 72 hours. **        ECG 12 Lead   Preliminary Result   HEART RATE= 50  bpm   RR Interval= 1212  ms   NJ Interval= 149  ms   P Horizontal Axis= 26  deg   P Front Axis= 56  deg   QRSD Interval= 89  ms   QT Interval= 478  ms   QRS Axis= 56  deg   T Wave Axis= 61  deg   - ABNORMAL ECG -   Sinus bradycardia   Evolution of Ant-Lat MI continues since 04-Jul-2021 0:54:06   When compared with ECG of 04-Jul-2021 3:24:11,   Nonspecific significant change   Electronically Signed By:    Date and Time of Study: 2021-07-04 08:29:53      ECG 12 Lead   Preliminary Result   HEART RATE= 66  bpm   RR Interval= 912  ms   NJ Interval= 179  ms   P Horizontal Axis= 22  deg   P Front Axis= 82  deg   QRSD Interval= 88  ms   QT Interval= 414  ms   QRS Axis= 58  deg   T Wave Axis= 38  deg   - ABNORMAL ECG -   Sinus rhythm   Evolving anterolateral infarct since 04-Jul-2021 0:54:06   When compared with ECG of 04-Jul-2021 0:54:06,   Significant rate increase   New or worsened ischemia or infarction   Electronically Signed By:    Date and Time of Study: 2021-07-04 03:24:11      ECG 12 Lead   Final Result   HEART  RATE= 42  bpm   RR Interval= 1420  ms   MD Interval= 175  ms   P Horizontal Axis= 0  deg   P Front Axis= 86  deg   QRSD Interval= 92  ms   QT Interval= 482  ms   QRS Axis= 8  deg   T Wave Axis= 3  deg   - ABNORMAL ECG -   Sinus bradycardia   Lateral infarct, acute (LAD)   No previous ECG available for comparison   Electronically Signed By: Jack Christianson (BLAINE) 04-Jul-2021 08:21:12   Date and Time of Study: 2021-07-04 00:54:06        CBC    Results from last 7 days   Lab Units 07/04/21  0419 07/04/21  0104   WBC 10*3/mm3 10.70 6.50   HEMOGLOBIN g/dL 13.1 13.6   PLATELETS 10*3/mm3 217 251     BMP   Results from last 7 days   Lab Units 07/04/21  0419   SODIUM mmol/L 136   POTASSIUM mmol/L 4.0   CHLORIDE mmol/L 103   CO2 mmol/L 26.0   BUN mg/dL 13   CREATININE mg/dL 0.80   GLUCOSE mg/dL 136*     CMP   Results from last 7 days   Lab Units 07/04/21  0419   SODIUM mmol/L 136   POTASSIUM mmol/L 4.0   CHLORIDE mmol/L 103   CO2 mmol/L 26.0   BUN mg/dL 13   CREATININE mg/dL 0.80   GLUCOSE mg/dL 136*   ALBUMIN g/dL 4.10   BILIRUBIN mg/dL 0.4   ALK PHOS U/L 67   AST (SGOT) U/L 18   ALT (SGPT) U/L 13     Medication Review  Scheduled Meds:aspirin, 81 mg, Oral, Daily  atropine sulfate, , ,   [START ON 7/5/2021] clopidogrel, 75 mg, Oral, Daily  escitalopram, 20 mg, Oral, Daily  sodium chloride, 10 mL, Intravenous, Q12H      Continuous Infusions:   PRN Meds:.•  acetaminophen **OR** acetaminophen  •  acetaminophen  •  aluminum-magnesium hydroxide-simethicone  •  atropine  •  clonazePAM  •  fentaNYL citrate (PF)  •  morphine **OR** Morphine  •  ondansetron **OR** ondansetron  •  sodium chloride  •  sodium chloride  •  sodium chloride    Assessment:      Acute lateral myocardial infarction (CMS/HCC)    Anxiety    PTSD (post-traumatic stress disorder)        Plan:    MDM:    1.  Acute anterior ST elevation myocardial infarction:    Patient has been given aspirin in the emergency room.  Integrilin and heparin bolus has been given.  Patient  is feeling still chest pain.  I would recommend to proceed with urgent cardiac catheterization.    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  There was a small branch of LAD was coming off the lesion.  Patient underwent stenting of this segment in LAD.  Small diagonal branch was compromised and which was subtotally included prior to the procedure included completely.  But it is less than 1 mm vessel.    Continue aspirin and Plavix.    Patient is on low-dose dopamine for relative bradycardia.  Patient is on nitro for angina pectoris.    I discussed the patients findings and my recommendations with patient and his sister    Jericho Lai MD  07/04/21  12:55 EDT

## 2021-07-05 ENCOUNTER — APPOINTMENT (OUTPATIENT)
Dept: GENERAL RADIOLOGY | Facility: HOSPITAL | Age: 61
End: 2021-07-05

## 2021-07-05 LAB
ALBUMIN SERPL-MCNC: 4.2 G/DL (ref 3.5–5.2)
ALBUMIN/GLOB SERPL: 1.5 G/DL
ALP SERPL-CCNC: 72 U/L (ref 39–117)
ALT SERPL W P-5'-P-CCNC: 42 U/L (ref 1–41)
ANION GAP SERPL CALCULATED.3IONS-SCNC: 7 MMOL/L (ref 5–15)
AST SERPL-CCNC: 219 U/L (ref 1–40)
BASOPHILS # BLD AUTO: 0 10*3/MM3 (ref 0–0.2)
BASOPHILS # BLD AUTO: 0.1 10*3/MM3 (ref 0–0.2)
BASOPHILS NFR BLD AUTO: 0.4 % (ref 0–1.5)
BASOPHILS NFR BLD AUTO: 0.6 % (ref 0–1.5)
BILIRUB SERPL-MCNC: 0.4 MG/DL (ref 0–1.2)
BILIRUB UR QL STRIP: NEGATIVE
BUN SERPL-MCNC: 12 MG/DL (ref 6–20)
BUN/CREAT SERPL: 13.8 (ref 7–25)
CALCIUM SPEC-SCNC: 9.1 MG/DL (ref 8.6–10.5)
CHLORIDE SERPL-SCNC: 98 MMOL/L (ref 98–107)
CLARITY UR: CLEAR
CO2 SERPL-SCNC: 29 MMOL/L (ref 22–29)
COLOR UR: YELLOW
CREAT SERPL-MCNC: 0.87 MG/DL (ref 0.76–1.27)
D-LACTATE SERPL-SCNC: 1 MMOL/L (ref 0.5–2)
DEPRECATED RDW RBC AUTO: 44.6 FL (ref 37–54)
DEPRECATED RDW RBC AUTO: 45.9 FL (ref 37–54)
EOSINOPHIL # BLD AUTO: 0 10*3/MM3 (ref 0–0.4)
EOSINOPHIL # BLD AUTO: 0.1 10*3/MM3 (ref 0–0.4)
EOSINOPHIL NFR BLD AUTO: 0.2 % (ref 0.3–6.2)
EOSINOPHIL NFR BLD AUTO: 0.6 % (ref 0.3–6.2)
ERYTHROCYTE [DISTWIDTH] IN BLOOD BY AUTOMATED COUNT: 13.6 % (ref 12.3–15.4)
ERYTHROCYTE [DISTWIDTH] IN BLOOD BY AUTOMATED COUNT: 13.9 % (ref 12.3–15.4)
GFR SERPL CREATININE-BSD FRML MDRD: 90 ML/MIN/1.73
GLOBULIN UR ELPH-MCNC: 2.8 GM/DL
GLUCOSE BLDC GLUCOMTR-MCNC: 111 MG/DL (ref 70–105)
GLUCOSE BLDC GLUCOMTR-MCNC: 143 MG/DL (ref 70–105)
GLUCOSE SERPL-MCNC: 111 MG/DL (ref 65–99)
GLUCOSE UR STRIP-MCNC: NEGATIVE MG/DL
HCT VFR BLD AUTO: 39.2 % (ref 37.5–51)
HCT VFR BLD AUTO: 40 % (ref 37.5–51)
HGB BLD-MCNC: 13.4 G/DL (ref 13–17.7)
HGB BLD-MCNC: 13.8 G/DL (ref 13–17.7)
HGB UR QL STRIP.AUTO: NEGATIVE
KETONES UR QL STRIP: NEGATIVE
LEUKOCYTE ESTERASE UR QL STRIP.AUTO: NEGATIVE
LYMPHOCYTES # BLD AUTO: 1.1 10*3/MM3 (ref 0.7–3.1)
LYMPHOCYTES # BLD AUTO: 1.5 10*3/MM3 (ref 0.7–3.1)
LYMPHOCYTES NFR BLD AUTO: 10.7 % (ref 19.6–45.3)
LYMPHOCYTES NFR BLD AUTO: 15.2 % (ref 19.6–45.3)
MAGNESIUM SERPL-MCNC: 2.1 MG/DL (ref 1.6–2.6)
MCH RBC QN AUTO: 31.9 PG (ref 26.6–33)
MCH RBC QN AUTO: 32 PG (ref 26.6–33)
MCHC RBC AUTO-ENTMCNC: 34.1 G/DL (ref 31.5–35.7)
MCHC RBC AUTO-ENTMCNC: 34.4 G/DL (ref 31.5–35.7)
MCV RBC AUTO: 93.2 FL (ref 79–97)
MCV RBC AUTO: 93.5 FL (ref 79–97)
MONOCYTES # BLD AUTO: 1 10*3/MM3 (ref 0.1–0.9)
MONOCYTES # BLD AUTO: 1.3 10*3/MM3 (ref 0.1–0.9)
MONOCYTES NFR BLD AUTO: 10.2 % (ref 5–12)
MONOCYTES NFR BLD AUTO: 13.5 % (ref 5–12)
NEUTROPHILS NFR BLD AUTO: 7.2 10*3/MM3 (ref 1.7–7)
NEUTROPHILS NFR BLD AUTO: 7.5 10*3/MM3 (ref 1.7–7)
NEUTROPHILS NFR BLD AUTO: 73.4 % (ref 42.7–76)
NEUTROPHILS NFR BLD AUTO: 75.2 % (ref 42.7–76)
NITRITE UR QL STRIP: NEGATIVE
NRBC BLD AUTO-RTO: 0 /100 WBC (ref 0–0.2)
NRBC BLD AUTO-RTO: 0 /100 WBC (ref 0–0.2)
PH UR STRIP.AUTO: 6.5 [PH] (ref 5–8)
PHOSPHATE SERPL-MCNC: 2.7 MG/DL (ref 2.5–4.5)
PLATELET # BLD AUTO: 195 10*3/MM3 (ref 140–450)
PLATELET # BLD AUTO: 210 10*3/MM3 (ref 140–450)
PMV BLD AUTO: 7.3 FL (ref 6–12)
PMV BLD AUTO: 7.7 FL (ref 6–12)
POTASSIUM SERPL-SCNC: 4 MMOL/L (ref 3.5–5.2)
PROCALCITONIN SERPL-MCNC: 0.12 NG/ML (ref 0–0.25)
PROT SERPL-MCNC: 7 G/DL (ref 6–8.5)
PROT UR QL STRIP: NEGATIVE
RBC # BLD AUTO: 4.19 10*6/MM3 (ref 4.14–5.8)
RBC # BLD AUTO: 4.29 10*6/MM3 (ref 4.14–5.8)
SODIUM SERPL-SCNC: 134 MMOL/L (ref 136–145)
SP GR UR STRIP: 1.01 (ref 1–1.03)
TROPONIN T SERPL-MCNC: 3.2 NG/ML (ref 0–0.03)
TROPONIN T SERPL-MCNC: 3.77 NG/ML (ref 0–0.03)
UROBILINOGEN UR QL STRIP: NORMAL
WBC # BLD AUTO: 9.8 10*3/MM3 (ref 3.4–10.8)
WBC # BLD AUTO: 9.9 10*3/MM3 (ref 3.4–10.8)

## 2021-07-05 PROCEDURE — 93005 ELECTROCARDIOGRAM TRACING: CPT | Performed by: INTERNAL MEDICINE

## 2021-07-05 PROCEDURE — 83605 ASSAY OF LACTIC ACID: CPT | Performed by: NURSE PRACTITIONER

## 2021-07-05 PROCEDURE — 84484 ASSAY OF TROPONIN QUANT: CPT | Performed by: INTERNAL MEDICINE

## 2021-07-05 PROCEDURE — 71045 X-RAY EXAM CHEST 1 VIEW: CPT

## 2021-07-05 PROCEDURE — 25010000002 AMIODARONE IN DEXTROSE 5% 150-4.21 MG/100ML-% SOLUTION: Performed by: INTERNAL MEDICINE

## 2021-07-05 PROCEDURE — 85025 COMPLETE CBC W/AUTO DIFF WBC: CPT | Performed by: NURSE PRACTITIONER

## 2021-07-05 PROCEDURE — 25010000002 MORPHINE PER 10 MG: Performed by: INTERNAL MEDICINE

## 2021-07-05 PROCEDURE — 81003 URINALYSIS AUTO W/O SCOPE: CPT | Performed by: INTERNAL MEDICINE

## 2021-07-05 PROCEDURE — 83735 ASSAY OF MAGNESIUM: CPT | Performed by: NURSE PRACTITIONER

## 2021-07-05 PROCEDURE — 84100 ASSAY OF PHOSPHORUS: CPT | Performed by: NURSE PRACTITIONER

## 2021-07-05 PROCEDURE — 25010000002 AMIODARONE PER 30 MG: Performed by: INTERNAL MEDICINE

## 2021-07-05 PROCEDURE — 93010 ELECTROCARDIOGRAM REPORT: CPT | Performed by: INTERNAL MEDICINE

## 2021-07-05 PROCEDURE — 84145 PROCALCITONIN (PCT): CPT | Performed by: NURSE PRACTITIONER

## 2021-07-05 PROCEDURE — 82962 GLUCOSE BLOOD TEST: CPT

## 2021-07-05 PROCEDURE — 99232 SBSQ HOSP IP/OBS MODERATE 35: CPT | Performed by: INTERNAL MEDICINE

## 2021-07-05 PROCEDURE — 87040 BLOOD CULTURE FOR BACTERIA: CPT | Performed by: NURSE PRACTITIONER

## 2021-07-05 PROCEDURE — 80053 COMPREHEN METABOLIC PANEL: CPT | Performed by: NURSE PRACTITIONER

## 2021-07-05 RX ORDER — AMIODARONE HCL/D5W 450 MG/250
0.5 PLASTIC BAG, INJECTION (ML) INTRAVENOUS CONTINUOUS
Status: DISCONTINUED | OUTPATIENT
Start: 2021-07-06 | End: 2021-07-06

## 2021-07-05 RX ORDER — AMIODARONE HCL/D5W 450 MG/250
1 PLASTIC BAG, INJECTION (ML) INTRAVENOUS CONTINUOUS
Status: DISCONTINUED | OUTPATIENT
Start: 2021-07-05 | End: 2021-07-06

## 2021-07-05 RX ORDER — LISINOPRIL 5 MG/1
2.5 TABLET ORAL
Status: DISCONTINUED | OUTPATIENT
Start: 2021-07-05 | End: 2021-07-07 | Stop reason: HOSPADM

## 2021-07-05 RX ADMIN — AMIODARONE HYDROCHLORIDE 1 MG/MIN: 50 INJECTION, SOLUTION INTRAVENOUS at 18:29

## 2021-07-05 RX ADMIN — ACETAMINOPHEN 650 MG: 325 TABLET, FILM COATED ORAL at 08:33

## 2021-07-05 RX ADMIN — Medication 10 ML: at 20:04

## 2021-07-05 RX ADMIN — CLOPIDOGREL BISULFATE 75 MG: 75 TABLET ORAL at 08:22

## 2021-07-05 RX ADMIN — MORPHINE SULFATE 2 MG: 4 INJECTION INTRAVENOUS at 05:54

## 2021-07-05 RX ADMIN — ACETAMINOPHEN 650 MG: 325 TABLET, FILM COATED ORAL at 21:57

## 2021-07-05 RX ADMIN — AMIODARONE HYDROCHLORIDE 150 MG: 1.5 INJECTION, SOLUTION INTRAVENOUS at 18:28

## 2021-07-05 RX ADMIN — ESCITALOPRAM OXALATE 20 MG: 10 TABLET ORAL at 08:22

## 2021-07-05 RX ADMIN — ASPIRIN 81 MG: 81 TABLET, COATED ORAL at 08:22

## 2021-07-05 RX ADMIN — MORPHINE SULFATE 2 MG: 4 INJECTION INTRAVENOUS at 20:00

## 2021-07-05 RX ADMIN — MORPHINE SULFATE 2 MG: 4 INJECTION INTRAVENOUS at 11:50

## 2021-07-05 RX ADMIN — METOPROLOL TARTRATE 12.5 MG: 25 TABLET, FILM COATED ORAL at 20:04

## 2021-07-05 RX ADMIN — LISINOPRIL 2.5 MG: 5 TABLET ORAL at 10:25

## 2021-07-05 RX ADMIN — METOPROLOL TARTRATE 12.5 MG: 25 TABLET, FILM COATED ORAL at 10:25

## 2021-07-05 NOTE — CASE MANAGEMENT/SOCIAL WORK
Discharge Planning Assessment  [facility]     Patient Name: Brayden Rainey  MRN: [id number]  Today's Date: 7/5/2021    Admit Date: 7/4/2021    Discharge Needs Assessment     Row Name 07/05/21 1136       Living Environment    Lives With  alone    Current Living Arrangements  home/apartment/condo    Quality of Family Relationships  supportive    Able to Return to Prior Arrangements  yes       Resource/Environmental Concerns    Resource/Environmental Concerns  financial NO INSURANCE    Transportation Concerns  car, none       Transition Planning    Patient/Family Anticipates Transition to  home    Transportation Anticipated  car, drives self       Discharge Needs Assessment    Readmission Within the Last 30 Days  no previous admission in last 30 days    Equipment Currently Used at Home  none        Discharge Plan     Row Name 07/05/21 1138       Plan    Plan  D/C Plan : Home    Plan Comments  Pt went for a cath and got a stent to his LAD . Pt in unisured . MSW to see for medication assistance if needed        Continued Care and Services - Admitted Since 7/4/2021    Coordination has not been started for this encounter.         Demographic Summary     Row Name 07/05/21 1136       General Information    Admission Type  inpatient    Arrived From  emergency department    Preferred Language  English     Used During This Interaction  no        Functional Status     Row Name 07/05/21 1136       Functional Status    Usual Activity Tolerance  good    Current Activity Tolerance  good       Mental Status    General Appearance WDL  WDL       Mental Status Summary    Recent Changes in Mental Status/Cognitive Functioning  no changes                   Kamille Donovan RN

## 2021-07-05 NOTE — PROGRESS NOTES
PULMONARY/CRITICAL CARE PROGRESS NOTE       NAME:     Brayden Rainey   AGE:     60 y.o.   SEX:  male   : 1960       MRN:       [id number]          RM: [facility]      ASSESSMENT & PLAN     F/U: Critical care management, acute lateral MI    Principal Problem:    Acute lateral myocardial infarction (CMS/HCC)  Active Problems:    Anxiety    PTSD (post-traumatic stress disorder)       Multidisciplinary Rounds:  No acute events overnight  No further bradycardia  Cardiology adjusting meds      Assessment and plan:  Acute lateral myocardial infarction  -S/p cardiac catheterization with Dr. Lai  -Monitored in ICU  -Reported bradycardia and cardiac Cath Lab-resolved  -Off dopamine gtt  -Morphine and fentanyl for pain, per cardiology  -Started on aspirin and Plavix  -Started on metoprolol      Anxiety  PTSD  -Continue home Lexapro  -Continue home dose as needed Klonopin     Code Status: Full  VTE Prophylaxis: SCDs  PUD Prophylaxis: Pepcid         [place] & SUBJECTIVE   Brayden Rainey is a 60 y.o. male  has a past medical history of Anxiety (2021) and PTSD (post-traumatic stress disorder) (2021).   Patient presented to [facility] on 2021 with complaint of chest pain.  Patient stated that he was at [employer] earlier this evening, and had sudden onset of chest pain that radiated down his left arm and up his neck and jaw.  Patient was diaphoretic at the time.  Patient described his pain as crushing, with no precipitating or alleviating factors.  EMS was called, and EKG obtained showed acute STEMI.  Patient was brought to [facility] ER and Cath Lab was activated.  Patient was found to have an acute anterior lateral myocardial infarction.  Cardiac cath by Dr. Lai, PCI to LAD with drug-eluting stent completed, stenosis was decreased from 80 to 90% to less than 10%.  Patient had 90-95% occlusion of LAD and 70-80% occlusion of RCA.  Per Dr. Lai there were small diagonal branches at the  "origin of lesion of the LAD, that were not able to open.  EF was found to be 45%.  Patient became bradycardic during cardiac catheterization and given atropine and started on dopamine.     Patient was admitted to ICU after cardiac cath, arterial sheath was still in place at the time.  Patient denies shortness of air, palpitations, dizziness or syncope, headache, chills, or fever.  Denies abdominal pain, nausea, vomiting, diarrhea, constipation, loss of weight or loss of appetite, dysuria, blood in urine or stool.     CBC was obtained in ED which was unremarkable.    REVIEW OF SYSTEMS:  7/5: No acute events overnight.  No further bradycardia.  Patient denies chest pain or shortness of air.    MEDICATIONS     SCHEDULED MEDICATIONS:   aspirin, 81 mg, Oral, Daily  clopidogrel, 75 mg, Oral, Daily  escitalopram, 20 mg, Oral, Daily  lisinopril, 2.5 mg, Oral, Q24H  metoprolol tartrate, 12.5 mg, Oral, Q12H  sodium chloride, 10 mL, Intravenous, Q12H        CONTINUOUS INFUSIONS:        PRN MEDS:  •  acetaminophen **OR** acetaminophen  •  acetaminophen  •  aluminum-magnesium hydroxide-simethicone  •  atropine  •  clonazePAM  •  fentaNYL citrate (PF)  •  morphine **OR** Morphine  •  ondansetron **OR** ondansetron  •  sodium chloride  •  sodium chloride  •  sodium chloride    OBJECTIVE     VITAL SIGNS:  /77   Pulse 70   Temp 100 °F (37.8 °C) (Oral)   Resp 18   Ht 170.2 cm (67\")   Wt 85.1 kg (187 lb 9.8 oz)   SpO2 94%   BMI 29.38 kg/m²     I/O FROM PREVIOUS 3 SHIFTS:  I/O last 3 completed shifts:  In: 795 [P.O.:680; I.V.:115]  Out: 602 [Urine:602]    I/O THIS SHIFT:  I/O this shift:  In: 200 [P.O.:200]  Out: -     PHYSICAL EXAM:  General Appearance:  Alert, cooperative, no distress, appears stated age  Head:  Normocephalic, without obvious abnormality, atraumatic  Eyes:  PERRL, conjunctiva/corneas clear, EOM's intact     Neck:  Supple,  no JVD, trachea midline  Lungs: Clear and equal throughout, respirations unlabored " without accessory muscle use  Chest wall: Symmetrical chest wall movement  Heart:  Regular rate and rhythm, S1 and S2 normal, no murmur, rub or gallop  Abdomen:  Soft, non-tender, bowel sounds active all four quadrants, no rebound or guarding  Extremities:  Extremities normal, no cyanosis or edema.  Sheath site no hematoma  Skin:  No rashes or lesions  Neurologic:   Alert and oriented, no focal deficits      RESULTS     LABS:  Lab Results (last 24 hours)     Procedure Component Value Units Date/Time    POC Glucose Once [[id number]]  (Abnormal) Collected: 07/05/21 0748    Specimen: Blood Updated: 07/05/21 0750     Glucose 111 mg/dL      Comment: Serial Number: [id number]:  [id number]       Phosphorus [[id number]]  (Normal) Collected: 07/05/21 0355    Specimen: Blood Updated: 07/05/21 0430     Phosphorus 2.7 mg/dL     Comprehensive Metabolic Panel [[id number]]  (Abnormal) Collected: 07/05/21 0355    Specimen: Blood Updated: 07/05/21 0430     Glucose 111 mg/dL      BUN 12 mg/dL      Creatinine 0.87 mg/dL      Sodium 134 mmol/L      Potassium 4.0 mmol/L      Chloride 98 mmol/L      CO2 29.0 mmol/L      Calcium 9.1 mg/dL      Total Protein 7.0 g/dL      Albumin 4.20 g/dL      ALT (SGPT) 42 U/L      AST (SGOT) 219 U/L      Alkaline Phosphatase 72 U/L      Total Bilirubin 0.4 mg/dL      eGFR Non African Amer 90 mL/min/1.73      Globulin 2.8 gm/dL      A/G Ratio 1.5 g/dL      BUN/Creatinine Ratio 13.8     Anion Gap 7.0 mmol/L     Narrative:      GFR Normal >60  Chronic Kidney Disease <60  Kidney Failure <15      Magnesium [[id number]]  (Normal) Collected: 07/05/21 0355    Specimen: Blood Updated: 07/05/21 0430     Magnesium 2.1 mg/dL     CBC & Differential [[id number]]  (Abnormal) Collected: 07/05/21 0355    Specimen: Blood Updated: 07/05/21 0407    Narrative:      The following orders were created for panel order CBC & Differential.  Procedure                               Abnormality         Status                      ---------                               -----------         ------                     CBC Auto Differential[[id number]]        Abnormal            Final result                 Please view results for these tests on the individual orders.    CBC Auto Differential [[id number]]  (Abnormal) Collected: 07/05/21 0355    Specimen: Blood Updated: 07/05/21 0407     WBC 9.80 10*3/mm3      RBC 4.29 10*6/mm3      Hemoglobin 13.8 g/dL      Hematocrit 40.0 %      MCV 93.2 fL      MCH 32.0 pg      MCHC 34.4 g/dL      RDW 13.9 %      RDW-SD 45.9 fl      MPV 7.3 fL      Platelets 210 10*3/mm3      Neutrophil % 73.4 %      Lymphocyte % 15.2 %      Monocyte % 10.2 %      Eosinophil % 0.6 %      Basophil % 0.6 %      Neutrophils, Absolute 7.20 10*3/mm3      Lymphocytes, Absolute 1.50 10*3/mm3      Monocytes, Absolute 1.00 10*3/mm3      Eosinophils, Absolute 0.10 10*3/mm3      Basophils, Absolute 0.10 10*3/mm3      nRBC 0.0 /100 WBC     POC Glucose Once [[id number]]  (Abnormal) Collected: 07/04/21 2018    Specimen: Blood Updated: 07/04/21 2019     Glucose 127 mg/dL      Comment: Serial Number: [id number]:  [id number]       Troponin [[id number]]  (Abnormal) Collected: 07/04/21 1742    Specimen: Blood Updated: 07/04/21 1828     Troponin T 1.600 ng/mL     Narrative:      Troponin T Reference Range:  <= 0.03 ng/mL-   Negative for AMI  >0.03 ng/mL-     Abnormal for myocardial necrosis.  Clinicians would have to utilize clinical acumen, EKG, Troponin and serial changes to determine if it is an Acute Myocardial Infarction or myocardial injury due to an underlying chronic condition.       Results may be falsely decreased if patient taking Biotin.      POC Glucose Once [[id number]]  (Abnormal) Collected: 07/04/21 1749    Specimen: Blood Updated: 07/04/21 1750     Glucose 125 mg/dL      Comment: Serial Number: [id number]:  [id number]              RADIOLOGY:  XR Chest 1 View    Result Date: 7/4/2021  Examination: XR CHEST 1 VW-  Date of  Exam: 7/4/2021 3:05 PM  Indication: dyspnea; I21.29-ST elevation (STEMI) myocardial infarction involving other sites.   Comparison: None available.  Technique: 1 view of the chest  FINDINGS: The heart size is within normal. Mediastinal and hilar contours are unremarkable. There is minor right diaphragmatic elevation. There is a 3 mm nodular opacity in the right upper lung. There is no airspace opacity. There are minor bandlike opacity in the left lower lung. No pleural effusion or pneumothorax.      1. Minor left basal opacity is likely atelectasis. 2. 3 mm right upper lung opacity is favored to be a granuloma in this patient with no reported history of smoking.  Electronically Signed By-America Mccarthy MD On:7/4/2021 6:00 PM This report was finalized on [id number] by  America Mccarthy MD.      ECHOCARDIOGRAM:       I reviewed the patient's new clinical results.    This note has been scribed by me for pulmonary attending physician.    Electronically signed by FRANCIA Lombardo, 07/05/21 at 10:33 EDT.     I personally have examined  and interviewed the patient. I have reviewed the history, data, problems, assessment and plan with our NP.  Critical care time in direct medical management (   ) minutes  Electronically signed by Vanessa Hallman MD, D,ABS, 07/05/21, 10:02 PM EDT.

## 2021-07-06 LAB
ALBUMIN SERPL-MCNC: 3.7 G/DL (ref 3.5–5.2)
ALBUMIN/GLOB SERPL: 1.5 G/DL
ALP SERPL-CCNC: 60 U/L (ref 39–117)
ALT SERPL W P-5'-P-CCNC: 31 U/L (ref 1–41)
ANION GAP SERPL CALCULATED.3IONS-SCNC: 9 MMOL/L (ref 5–15)
AST SERPL-CCNC: 116 U/L (ref 1–40)
BASOPHILS # BLD AUTO: 0 10*3/MM3 (ref 0–0.2)
BASOPHILS NFR BLD AUTO: 0.5 % (ref 0–1.5)
BILIRUB SERPL-MCNC: 0.5 MG/DL (ref 0–1.2)
BUN SERPL-MCNC: 11 MG/DL (ref 6–20)
BUN/CREAT SERPL: 12.6 (ref 7–25)
CALCIUM SPEC-SCNC: 8.7 MG/DL (ref 8.6–10.5)
CHLORIDE SERPL-SCNC: 98 MMOL/L (ref 98–107)
CO2 SERPL-SCNC: 27 MMOL/L (ref 22–29)
CREAT SERPL-MCNC: 0.87 MG/DL (ref 0.76–1.27)
D-LACTATE SERPL-SCNC: 0.8 MMOL/L (ref 0.5–2)
D-LACTATE SERPL-SCNC: 1 MMOL/L (ref 0.5–2)
DEPRECATED RDW RBC AUTO: 44.2 FL (ref 37–54)
EOSINOPHIL # BLD AUTO: 0 10*3/MM3 (ref 0–0.4)
EOSINOPHIL NFR BLD AUTO: 0.1 % (ref 0.3–6.2)
ERYTHROCYTE [DISTWIDTH] IN BLOOD BY AUTOMATED COUNT: 13.6 % (ref 12.3–15.4)
GFR SERPL CREATININE-BSD FRML MDRD: 90 ML/MIN/1.73
GLOBULIN UR ELPH-MCNC: 2.5 GM/DL
GLUCOSE BLDC GLUCOMTR-MCNC: 100 MG/DL (ref 70–105)
GLUCOSE BLDC GLUCOMTR-MCNC: 138 MG/DL (ref 70–105)
GLUCOSE SERPL-MCNC: 116 MG/DL (ref 65–99)
HCT VFR BLD AUTO: 38.8 % (ref 37.5–51)
HGB BLD-MCNC: 13 G/DL (ref 13–17.7)
LYMPHOCYTES # BLD AUTO: 1.2 10*3/MM3 (ref 0.7–3.1)
LYMPHOCYTES NFR BLD AUTO: 12.1 % (ref 19.6–45.3)
MAGNESIUM SERPL-MCNC: 1.9 MG/DL (ref 1.6–2.6)
MCH RBC QN AUTO: 31.5 PG (ref 26.6–33)
MCHC RBC AUTO-ENTMCNC: 33.4 G/DL (ref 31.5–35.7)
MCV RBC AUTO: 94.1 FL (ref 79–97)
MONOCYTES # BLD AUTO: 1.7 10*3/MM3 (ref 0.1–0.9)
MONOCYTES NFR BLD AUTO: 16.9 % (ref 5–12)
NEUTROPHILS NFR BLD AUTO: 7.1 10*3/MM3 (ref 1.7–7)
NEUTROPHILS NFR BLD AUTO: 70.4 % (ref 42.7–76)
NRBC BLD AUTO-RTO: 0 /100 WBC (ref 0–0.2)
PHOSPHATE SERPL-MCNC: 2.4 MG/DL (ref 2.5–4.5)
PLATELET # BLD AUTO: 187 10*3/MM3 (ref 140–450)
PMV BLD AUTO: 8.1 FL (ref 6–12)
POTASSIUM SERPL-SCNC: 3.9 MMOL/L (ref 3.5–5.2)
PROT SERPL-MCNC: 6.2 G/DL (ref 6–8.5)
RBC # BLD AUTO: 4.12 10*6/MM3 (ref 4.14–5.8)
SODIUM SERPL-SCNC: 134 MMOL/L (ref 136–145)
WBC # BLD AUTO: 10.1 10*3/MM3 (ref 3.4–10.8)

## 2021-07-06 PROCEDURE — 85025 COMPLETE CBC W/AUTO DIFF WBC: CPT | Performed by: NURSE PRACTITIONER

## 2021-07-06 PROCEDURE — 25010000002 AMIODARONE PER 30 MG: Performed by: INTERNAL MEDICINE

## 2021-07-06 PROCEDURE — 83735 ASSAY OF MAGNESIUM: CPT | Performed by: NURSE PRACTITIONER

## 2021-07-06 PROCEDURE — 80053 COMPREHEN METABOLIC PANEL: CPT | Performed by: NURSE PRACTITIONER

## 2021-07-06 PROCEDURE — 84100 ASSAY OF PHOSPHORUS: CPT | Performed by: NURSE PRACTITIONER

## 2021-07-06 PROCEDURE — 93005 ELECTROCARDIOGRAM TRACING: CPT | Performed by: INTERNAL MEDICINE

## 2021-07-06 PROCEDURE — 99232 SBSQ HOSP IP/OBS MODERATE 35: CPT | Performed by: INTERNAL MEDICINE

## 2021-07-06 PROCEDURE — 82962 GLUCOSE BLOOD TEST: CPT

## 2021-07-06 PROCEDURE — 83605 ASSAY OF LACTIC ACID: CPT | Performed by: NURSE PRACTITIONER

## 2021-07-06 PROCEDURE — 25010000002 MORPHINE PER 10 MG: Performed by: INTERNAL MEDICINE

## 2021-07-06 PROCEDURE — 93010 ELECTROCARDIOGRAM REPORT: CPT | Performed by: INTERNAL MEDICINE

## 2021-07-06 RX ORDER — BISACODYL 5 MG/1
10 TABLET, DELAYED RELEASE ORAL DAILY PRN
Status: DISCONTINUED | OUTPATIENT
Start: 2021-07-06 | End: 2021-07-07 | Stop reason: HOSPADM

## 2021-07-06 RX ORDER — DOCUSATE SODIUM 100 MG/1
100 CAPSULE, LIQUID FILLED ORAL DAILY
Status: DISCONTINUED | OUTPATIENT
Start: 2021-07-06 | End: 2021-07-07 | Stop reason: HOSPADM

## 2021-07-06 RX ORDER — AMIODARONE HYDROCHLORIDE 200 MG/1
200 TABLET ORAL EVERY 12 HOURS SCHEDULED
Status: DISCONTINUED | OUTPATIENT
Start: 2021-07-06 | End: 2021-07-07 | Stop reason: HOSPADM

## 2021-07-06 RX ORDER — POLYETHYLENE GLYCOL 3350 17 G/17G
17 POWDER, FOR SOLUTION ORAL DAILY
Status: DISCONTINUED | OUTPATIENT
Start: 2021-07-06 | End: 2021-07-07

## 2021-07-06 RX ADMIN — POLYETHYLENE GLYCOL 3350 17 G: 17 POWDER, FOR SOLUTION ORAL at 18:39

## 2021-07-06 RX ADMIN — DOCUSATE SODIUM 100 MG: 100 CAPSULE, LIQUID FILLED ORAL at 18:39

## 2021-07-06 RX ADMIN — Medication 10 ML: at 08:01

## 2021-07-06 RX ADMIN — MORPHINE SULFATE 1 MG: 4 INJECTION INTRAVENOUS at 22:09

## 2021-07-06 RX ADMIN — AMIODARONE HYDROCHLORIDE 200 MG: 200 TABLET ORAL at 22:04

## 2021-07-06 RX ADMIN — MORPHINE SULFATE 2 MG: 4 INJECTION INTRAVENOUS at 00:23

## 2021-07-06 RX ADMIN — METOPROLOL TARTRATE 12.5 MG: 25 TABLET, FILM COATED ORAL at 22:04

## 2021-07-06 RX ADMIN — ESCITALOPRAM OXALATE 20 MG: 10 TABLET ORAL at 08:01

## 2021-07-06 RX ADMIN — METOPROLOL TARTRATE 12.5 MG: 25 TABLET, FILM COATED ORAL at 08:12

## 2021-07-06 RX ADMIN — MORPHINE SULFATE 2 MG: 4 INJECTION INTRAVENOUS at 16:10

## 2021-07-06 RX ADMIN — AMIODARONE HYDROCHLORIDE 0.5 MG/MIN: 50 INJECTION, SOLUTION INTRAVENOUS at 00:23

## 2021-07-06 RX ADMIN — ASPIRIN 81 MG: 81 TABLET, COATED ORAL at 08:01

## 2021-07-06 RX ADMIN — CLOPIDOGREL BISULFATE 75 MG: 75 TABLET ORAL at 08:00

## 2021-07-06 RX ADMIN — LISINOPRIL 2.5 MG: 5 TABLET ORAL at 08:00

## 2021-07-06 RX ADMIN — Medication 10 ML: at 22:05

## 2021-07-06 NOTE — CASE MANAGEMENT/SOCIAL WORK
Continued Stay Note  [facility]     Patient Name: Brayden Rainey  MRN: [id number]  Today's Date: 7/6/2021    Admit Date: 7/4/2021    Discharge Plan     Row Name 07/06/21 1249       Plan    Plan Comments  SW met with pt at the bedside to offer meds to bed enrollment. Pt reports he would like to see the cost difference in d/c medications at [facility] in [place] vs meds to bed prior to deciding. SW to follow for d/c medication list and complete cost check as appropriate. If pt opts to utilize external pharmacy, GoodRx card will be provided.        Met with patient in room wearing PPE: mask, face shield/goggles.      Maintained distance greater than six feet and spent less than 15 minutes in the room.      [facility]aman [facility], [facility]    Office: [phone]  Cell: [phone]  Fax: [phone]  E-mail: [email or web address]

## 2021-07-06 NOTE — PLAN OF CARE
Goal Outcome Evaluation:   Patient converted to NSR at 0537 O2 sat 97% on 2 l/min NC  EKG unchanged from previous  still having chest pain at times lungs clear no further temperture

## 2021-07-06 NOTE — PROGRESS NOTES
PULMONARY/CRITICAL CARE PROGRESS NOTE       NAME:     Brayden Rainey   AGE:     60 y.o.   SEX:  male   : 1960       MRN:       [id number]          RM: [facility]      ASSESSMENT & PLAN     F/U: Critical care management, acute lateral MI    Principal Problem:    Acute lateral myocardial infarction (CMS/HCC)  Active Problems:    Anxiety    PTSD (post-traumatic stress disorder)       Multidisciplinary Rounds:  -Remains on amiodarone gtt, plan to transition to oral today  -Fever of 102 °F overnight, nose form of sepsis noted, likely related to AMI, monitor off antibiotics    Assessment and plan:  Acute lateral myocardial infarction  -S/p cardiac catheterization with Dr. Lai  -Monitored in ICU  -Reported bradycardia and cardiac Cath Lab-resolved  -Off dopamine gtt  -Morphine and fentanyl for pain, per cardiology  -Started on aspirin and Plavix  -Started on metoprolol     Atrial fibrillation with RVR, now converted to NSR  -Amiodarone bolus and drip  -Normal sinus rhythm  -Cardiology managing    Fever  -T Max 102F  -Procalcitonin 0.12, lactate 0.8, WBCs 10.1 and without bandemia  -UA-negative  -RVP/Covid-negative  -Fever was likely due to acute MI, continue to closely monitor  -Monitor off antibiotics  -Chest x-ray was negative for acute cardiopulmonary abnormalities.     Anxiety  PTSD  -Continue home Lexapro  -Continue home dose as needed Klonopin     Code Status: Full  VTE Prophylaxis: SCDs  PUD Prophylaxis: Pepcid    [place] & SUBJECTIVE     Brayden Rainey is a 60 y.o. male  has a past medical history of Anxiety (2021) and PTSD (post-traumatic stress disorder) (2021).   Patient presented to [facility] on 2021 with complaint of chest pain.  Patient stated that he was at [employer] earlier this evening, and had sudden onset of chest pain that radiated down his left arm and up his neck and jaw.  Patient was diaphoretic at the time.  Patient described his pain as crushing, with no  precipitating or alleviating factors.  EMS was called, and EKG obtained showed acute STEMI.  Patient was brought to [facility] ER and Cath Lab was activated.  Patient was found to have an acute anterior lateral myocardial infarction.  Cardiac cath by Dr. Lai, PCI to LAD with drug-eluting stent completed, stenosis was decreased from 80 to 90% to less than 10%.  Patient had 90-95% occlusion of LAD and 70-80% occlusion of RCA.  Per Dr. Lai there were small diagonal branches at the origin of lesion of the LAD, that were not able to open.  EF was found to be 45%.  Patient became bradycardic during cardiac catheterization and given atropine and started on dopamine.     Patient was admitted to ICU after cardiac cath, arterial sheath was still in place at the time.  Patient denies shortness of air, palpitations, dizziness or syncope, headache, chills, or fever.  Denies abdominal pain, nausea, vomiting, diarrhea, constipation, loss of weight or loss of appetite, dysuria, blood in urine or stool.     CBC was obtained in ED which was unremarkable.    7/5: No acute events overnight.  No further bradycardia.  Patient denies chest pain or shortness of air.  7/6: No acute events overnight, on amiodarone drip, denies chest pain, shortness of breath.  Did spike a fever of 102 overnight, without signs of sepsis.    REVIEW OF SYSTEMS:  Pertinent items are noted in HPI, all other systems reviewed and negative      MEDICATIONS     SCHEDULED MEDICATIONS:   aspirin, 81 mg, Oral, Daily  clopidogrel, 75 mg, Oral, Daily  escitalopram, 20 mg, Oral, Daily  lisinopril, 2.5 mg, Oral, Q24H  metoprolol tartrate, 12.5 mg, Oral, Q12H  sodium chloride, 10 mL, Intravenous, Q12H        CONTINUOUS INFUSIONS:   amiodarone, 0.5 mg/min, Last Rate: 0.5 mg/min (07/06/21 0023)        PRN MEDS:  •  acetaminophen **OR** acetaminophen  •  acetaminophen  •  aluminum-magnesium hydroxide-simethicone  •  atropine  •  clonazePAM  •  fentaNYL citrate  "(PF)  •  morphine **OR** Morphine  •  ondansetron **OR** ondansetron  •  sodium chloride  •  sodium chloride  •  sodium chloride    OBJECTIVE     VITAL SIGNS:  /75   Pulse 67   Temp 98.2 °F (36.8 °C) (Oral)   Resp 20   Ht 170.2 cm (67\")   Wt 86.7 kg (191 lb 2.2 oz)   SpO2 96%   BMI 29.94 kg/m²     I/O FROM PREVIOUS 3 SHIFTS:  I/O last 3 completed shifts:  In: 1632 [P.O.:1320; I.V.:312]  Out: 3925 [Urine:3925]    I/O THIS SHIFT:  No intake/output data recorded.    PHYSICAL EXAM:  General Appearance:  Alert, cooperative, no distress, appears stated age  Head:  Normocephalic, without obvious abnormality, atraumatic  Eyes:  PERRL, conjunctiva/corneas clear, EOM's intact     Neck:  Supple,  no JVD, trachea midline  Lungs: Clear and equal throughout, respirations unlabored without accessory muscle use  Chest wall: Symmetrical chest wall movement  Heart:  Regular rate and rhythm, S1 and S2 normal, no murmur, rub or gallop  Abdomen:  Soft, non-tender, bowel sounds active all four quadrants, no rebound or guarding  Extremities:  Extremities normal, no cyanosis or edema.  Sheath site, with sheath now removed, site is soft and without hematoma, dressing intact.  Skin:  No rashes or lesions  Neurologic:   Alert and oriented, no focal deficits      RESULTS     LABS:  Lab Results (last 24 hours)     Procedure Component Value Units Date/Time    Lactic Acid, Plasma [[id number]]  (Normal) Collected: 07/06/21 0600    Specimen: Blood Updated: 07/06/21 0726     Lactate 1.0 mmol/L     Comprehensive Metabolic Panel [[id number]]  (Abnormal) Collected: 07/06/21 0023    Specimen: Blood Updated: 07/06/21 0114     Glucose 116 mg/dL      BUN 11 mg/dL      Creatinine 0.87 mg/dL      Sodium 134 mmol/L      Potassium 3.9 mmol/L      Chloride 98 mmol/L      CO2 27.0 mmol/L      Calcium 8.7 mg/dL      Total Protein 6.2 g/dL      Albumin 3.70 g/dL      ALT (SGPT) 31 U/L      AST (SGOT) 116 U/L      Alkaline Phosphatase 60 U/L      Total " Bilirubin 0.5 mg/dL      eGFR Non African Amer 90 mL/min/1.73      Globulin 2.5 gm/dL      A/G Ratio 1.5 g/dL      BUN/Creatinine Ratio 12.6     Anion Gap 9.0 mmol/L     Narrative:      GFR Normal >60  Chronic Kidney Disease <60  Kidney Failure <15      Phosphorus [[id number]]  (Abnormal) Collected: 07/06/21 0023    Specimen: Blood Updated: 07/06/21 0114     Phosphorus 2.4 mg/dL     Magnesium [[id number]]  (Normal) Collected: 07/06/21 0023    Specimen: Blood Updated: 07/06/21 0114     Magnesium 1.9 mg/dL     Lactic Acid, Plasma [[id number]]  (Normal) Collected: 07/06/21 0023    Specimen: Blood Updated: 07/06/21 0112     Lactate 0.8 mmol/L     CBC & Differential [[id number]]  (Abnormal) Collected: 07/06/21 0023    Specimen: Blood Updated: 07/06/21 0052    Narrative:      The following orders were created for panel order CBC & Differential.  Procedure                               Abnormality         Status                     ---------                               -----------         ------                     CBC Auto Differential[[id number]]        Abnormal            Final result                 Please view results for these tests on the individual orders.    CBC Auto Differential [[id number]]  (Abnormal) Collected: 07/06/21 0023    Specimen: Blood Updated: 07/06/21 0052     WBC 10.10 10*3/mm3      RBC 4.12 10*6/mm3      Hemoglobin 13.0 g/dL      Hematocrit 38.8 %      MCV 94.1 fL      MCH 31.5 pg      MCHC 33.4 g/dL      RDW 13.6 %      RDW-SD 44.2 fl      MPV 8.1 fL      Platelets 187 10*3/mm3      Neutrophil % 70.4 %      Lymphocyte % 12.1 %      Monocyte % 16.9 %      Eosinophil % 0.1 %      Basophil % 0.5 %      Neutrophils, Absolute 7.10 10*3/mm3      Lymphocytes, Absolute 1.20 10*3/mm3      Monocytes, Absolute 1.70 10*3/mm3      Eosinophils, Absolute 0.00 10*3/mm3      Basophils, Absolute 0.00 10*3/mm3      nRBC 0.0 /100 WBC     Procalcitonin [[id number]]  (Normal) Collected: 07/05/21 2103    Specimen: Blood  "Updated: 07/05/21 2144     Procalcitonin 0.12 ng/mL     Narrative:      As a Marker for Sepsis (Non-Neonates):     1. <0.5 ng/mL represents a low risk of severe sepsis and/or septic shock.  2. >2 ng/mL represents a high risk of severe sepsis and/or septic shock.    As a Marker for Lower Respiratory Tract Infections that require antibiotic therapy:  PCT on Admission     Antibiotic Therapy             6-12 Hrs later  >0.5                          Strongly Recommended            >0.25 - <0.5             Recommended  0.1 - 0.25                  Discouraged                       Remeasure/reassess PCT  <0.1                         Strongly Discouraged         Remeasure/reassess PCT      As 28 day mortality risk marker: \"Change in Procalcitonin Result\" (>80% or <=80%) if Day 0 (or Day 1) and Day 4 values are available. Refer to http://www.Streempct-calculator.com/    Change in PCT <=80 %   A decrease of PCT levels below or equal to 80% defines a positive change in PCT test result representing a higher risk for 28-day all-cause mortality of patients diagnosed with severe sepsis or septic shock.    Change in PCT >80 %   A decrease of PCT levels of more than 80% defines a negative change in PCT result representing a lower risk for 28-day all-cause mortality of patients diagnosed with severe sepsis or septic shock.              Results may be falsely decreased if patient taking Biotin.     Lactic Acid, Plasma [[id number]]  (Normal) Collected: 07/05/21 2102    Specimen: Blood Updated: 07/05/21 2137     Lactate 1.0 mmol/L     Troponin [[id number]]  (Abnormal) Collected: 07/05/21 2103    Specimen: Blood Updated: 07/05/21 2137     Troponin T 3.200 ng/mL     Narrative:      Troponin T Reference Range:  <= 0.03 ng/mL-   Negative for AMI  >0.03 ng/mL-     Abnormal for myocardial necrosis.  Clinicians would have to utilize clinical acumen, EKG, Troponin and serial changes to determine if it is an Acute Myocardial Infarction or " myocardial injury due to an underlying chronic condition.       Results may be falsely decreased if patient taking Biotin.      CBC & Differential [[id number]]  (Abnormal) Collected: 07/05/21 2103    Specimen: Blood Updated: 07/05/21 2114    Narrative:      The following orders were created for panel order CBC & Differential.  Procedure                               Abnormality         Status                     ---------                               -----------         ------                     CBC Auto Differential[[id number]]        Abnormal            Final result                 Please view results for these tests on the individual orders.    CBC Auto Differential [[id number]]  (Abnormal) Collected: 07/05/21 2103    Specimen: Blood Updated: 07/05/21 2114     WBC 9.90 10*3/mm3      RBC 4.19 10*6/mm3      Hemoglobin 13.4 g/dL      Hematocrit 39.2 %      MCV 93.5 fL      MCH 31.9 pg      MCHC 34.1 g/dL      RDW 13.6 %      RDW-SD 44.6 fl      MPV 7.7 fL      Platelets 195 10*3/mm3      Neutrophil % 75.2 %      Lymphocyte % 10.7 %      Monocyte % 13.5 %      Eosinophil % 0.2 %      Basophil % 0.4 %      Neutrophils, Absolute 7.50 10*3/mm3      Lymphocytes, Absolute 1.10 10*3/mm3      Monocytes, Absolute 1.30 10*3/mm3      Eosinophils, Absolute 0.00 10*3/mm3      Basophils, Absolute 0.00 10*3/mm3      nRBC 0.0 /100 WBC     Blood Culture - Blood, Hand, Right [[id number]] Collected: 07/05/21 2103    Specimen: Blood from Hand, Right Updated: 07/05/21 2110    Blood Culture - Blood, Arm, Right [[id number]] Collected: 07/05/21 2103    Specimen: Blood from Arm, Right Updated: 07/05/21 2110    Urinalysis With Microscopic If Indicated (No Culture) - Urine, Clean Catch [[id number]]  (Normal) Collected: 07/05/21 1952    Specimen: Urine, Clean Catch Updated: 07/05/21 2015     Color, UA Yellow     Appearance, UA Clear     pH, UA 6.5     Specific Gravity, UA 1.014     Glucose, UA Negative     Ketones, UA Negative     Bilirubin, UA  Negative     Blood, UA Negative     Protein, UA Negative     Leuk Esterase, UA Negative     Nitrite, UA Negative     Urobilinogen, UA 0.2 E.U./dL    Narrative:      Urine microscopic not indicated.    Troponin [[id number]]  (Abnormal) Collected: 07/05/21 1715    Specimen: Blood Updated: 07/05/21 1738     Troponin T 3.770 ng/mL     Narrative:      Troponin T Reference Range:  <= 0.03 ng/mL-   Negative for AMI  >0.03 ng/mL-     Abnormal for myocardial necrosis.  Clinicians would have to utilize clinical acumen, EKG, Troponin and serial changes to determine if it is an Acute Myocardial Infarction or myocardial injury due to an underlying chronic condition.       Results may be falsely decreased if patient taking Biotin.             RADIOLOGY:  XR Chest 1 View    Result Date: 7/5/2021  Frontal chest radiograph Indication:  Fever Comparison:  July 4, 2021 Findings: No focal consolidation, pneumothorax, or pleural fluid collection seen. The cardiomediastinal silhouette is unchanged. No acute focal bony abnormality seen.     Impression: No acute cardiopulmonary process seen. Electronically signed by:  Eligio Aquino M.D.  7/5/2021 8:19 PM      ECHOCARDIOGRAM:  Echocardiogram-pending    I reviewed the patient's new clinical results.    This note has been scribed by me for pulmonary attending physician.    Electronically signed by FRANCIA Gutierrez, 07/06/21 at 08:27 EDT.     I personally have examined  and interviewed the patient. I have reviewed the history, data, problems, assessment and plan with our NP.  Critical care time in direct medical management (   ) minutes  Electronically signed by Vanessa Hallman MD, D,ABS, 07/06/21, 9:13 PM EDT.

## 2021-07-06 NOTE — PROGRESS NOTES
LOS: 2 days   Admiting Physician- Virginia Kumar MD    Reason For Followup:    Acute anterolateral myocardial infarction  CAD  Cardiomyopathy    Subjective     No chest pain.    Objective     Hemodynamics are stable    Review of Systems:   Review of Systems   Constitutional: Negative for chills and fever.   HENT: Negative for ear discharge and nosebleeds.    Eyes: Negative for discharge and redness.   Cardiovascular: Negative for chest pain, orthopnea, palpitations, paroxysmal nocturnal dyspnea and syncope.   Respiratory: Negative for cough, shortness of breath and wheezing.    Endocrine: Negative for heat intolerance.   Skin: Negative for rash.   Musculoskeletal: Negative for arthritis and myalgias.   Gastrointestinal: Negative for abdominal pain, melena, nausea and vomiting.   Genitourinary: Negative for dysuria and hematuria.   Neurological: Negative for dizziness, light-headedness, numbness and tremors.   Psychiatric/Behavioral: Negative for depression. The patient is not nervous/anxious.          Vital Signs  Vitals:    07/06/21 0803 07/06/21 0812 07/06/21 1003 07/06/21 1203   BP: 120/75 120/75 100/69 95/57   BP Location:       Patient Position:       Pulse: 66 67 71 59   Resp:       Temp:       TempSrc:       SpO2: 97%  98% 99%   Weight:       Height:         Wt Readings from Last 1 Encounters:   07/06/21 86.7 kg (191 lb 2.2 oz)       Intake/Output Summary (Last 24 hours) at 7/6/2021 1458  Last data filed at 7/6/2021 0800  Gross per 24 hour   Intake 1172 ml   Output 2275 ml   Net -1103 ml     Physical Exam:  Constitutional:       Appearance: Well-developed.   Eyes:      General: No scleral icterus.        Right eye: No discharge.   HENT:      Head: Normocephalic and atraumatic.   Neck:      Thyroid: No thyromegaly.      Lymphadenopathy: No cervical adenopathy.   Pulmonary:      Effort: Pulmonary effort is normal. No respiratory distress.      Breath sounds: Normal breath sounds. No wheezing. No rales.    Cardiovascular:      Normal rate. Regular rhythm.      No gallop.   Edema:     Peripheral edema absent.   Abdominal:      Tenderness: There is no abdominal tenderness.   Skin:     Findings: No erythema or rash.   Neurological:      Mental Status: Alert and oriented to person, place, and time.         Results Review:   Lab Results (last 24 hours)     Procedure Component Value Units Date/Time    Lactic Acid, Plasma [[id number]]  (Normal) Collected: 07/06/21 0600    Specimen: Blood Updated: 07/06/21 0726     Lactate 1.0 mmol/L     Comprehensive Metabolic Panel [[id number]]  (Abnormal) Collected: 07/06/21 0023    Specimen: Blood Updated: 07/06/21 0114     Glucose 116 mg/dL      BUN 11 mg/dL      Creatinine 0.87 mg/dL      Sodium 134 mmol/L      Potassium 3.9 mmol/L      Chloride 98 mmol/L      CO2 27.0 mmol/L      Calcium 8.7 mg/dL      Total Protein 6.2 g/dL      Albumin 3.70 g/dL      ALT (SGPT) 31 U/L      AST (SGOT) 116 U/L      Alkaline Phosphatase 60 U/L      Total Bilirubin 0.5 mg/dL      eGFR Non African Amer 90 mL/min/1.73      Globulin 2.5 gm/dL      A/G Ratio 1.5 g/dL      BUN/Creatinine Ratio 12.6     Anion Gap 9.0 mmol/L     Narrative:      GFR Normal >60  Chronic Kidney Disease <60  Kidney Failure <15      Phosphorus [[id number]]  (Abnormal) Collected: 07/06/21 0023    Specimen: Blood Updated: 07/06/21 0114     Phosphorus 2.4 mg/dL     Magnesium [[id number]]  (Normal) Collected: 07/06/21 0023    Specimen: Blood Updated: 07/06/21 0114     Magnesium 1.9 mg/dL     Lactic Acid, Plasma [[id number]]  (Normal) Collected: 07/06/21 0023    Specimen: Blood Updated: 07/06/21 0112     Lactate 0.8 mmol/L     CBC & Differential [[id number]]  (Abnormal) Collected: 07/06/21 0023    Specimen: Blood Updated: 07/06/21 0052    Narrative:      The following orders were created for panel order CBC & Differential.  Procedure                               Abnormality         Status                     ---------                         "       -----------         ------                     CBC Auto Differential[[id number]]        Abnormal            Final result                 Please view results for these tests on the individual orders.    CBC Auto Differential [[id number]]  (Abnormal) Collected: 07/06/21 0023    Specimen: Blood Updated: 07/06/21 0052     WBC 10.10 10*3/mm3      RBC 4.12 10*6/mm3      Hemoglobin 13.0 g/dL      Hematocrit 38.8 %      MCV 94.1 fL      MCH 31.5 pg      MCHC 33.4 g/dL      RDW 13.6 %      RDW-SD 44.2 fl      MPV 8.1 fL      Platelets 187 10*3/mm3      Neutrophil % 70.4 %      Lymphocyte % 12.1 %      Monocyte % 16.9 %      Eosinophil % 0.1 %      Basophil % 0.5 %      Neutrophils, Absolute 7.10 10*3/mm3      Lymphocytes, Absolute 1.20 10*3/mm3      Monocytes, Absolute 1.70 10*3/mm3      Eosinophils, Absolute 0.00 10*3/mm3      Basophils, Absolute 0.00 10*3/mm3      nRBC 0.0 /100 WBC     Procalcitonin [[id number]]  (Normal) Collected: 07/05/21 2103    Specimen: Blood Updated: 07/05/21 2144     Procalcitonin 0.12 ng/mL     Narrative:      As a Marker for Sepsis (Non-Neonates):     1. <0.5 ng/mL represents a low risk of severe sepsis and/or septic shock.  2. >2 ng/mL represents a high risk of severe sepsis and/or septic shock.    As a Marker for Lower Respiratory Tract Infections that require antibiotic therapy:  PCT on Admission     Antibiotic Therapy             6-12 Hrs later  >0.5                          Strongly Recommended            >0.25 - <0.5             Recommended  0.1 - 0.25                  Discouraged                       Remeasure/reassess PCT  <0.1                         Strongly Discouraged         Remeasure/reassess PCT      As 28 day mortality risk marker: \"Change in Procalcitonin Result\" (>80% or <=80%) if Day 0 (or Day 1) and Day 4 values are available. Refer to http://www.[employer]-pct-calculator.com/    Change in PCT <=80 %   A decrease of PCT levels below or equal to 80% defines a positive change " in PCT test result representing a higher risk for 28-day all-cause mortality of patients diagnosed with severe sepsis or septic shock.    Change in PCT >80 %   A decrease of PCT levels of more than 80% defines a negative change in PCT result representing a lower risk for 28-day all-cause mortality of patients diagnosed with severe sepsis or septic shock.              Results may be falsely decreased if patient taking Biotin.     Lactic Acid, Plasma [[id number]]  (Normal) Collected: 07/05/21 2102    Specimen: Blood Updated: 07/05/21 2137     Lactate 1.0 mmol/L     Troponin [[id number]]  (Abnormal) Collected: 07/05/21 2103    Specimen: Blood Updated: 07/05/21 2137     Troponin T 3.200 ng/mL     Narrative:      Troponin T Reference Range:  <= 0.03 ng/mL-   Negative for AMI  >0.03 ng/mL-     Abnormal for myocardial necrosis.  Clinicians would have to utilize clinical acumen, EKG, Troponin and serial changes to determine if it is an Acute Myocardial Infarction or myocardial injury due to an underlying chronic condition.       Results may be falsely decreased if patient taking Biotin.      CBC & Differential [[id number]]  (Abnormal) Collected: 07/05/21 2103    Specimen: Blood Updated: 07/05/21 2114    Narrative:      The following orders were created for panel order CBC & Differential.  Procedure                               Abnormality         Status                     ---------                               -----------         ------                     CBC Auto Differential[[id number]]        Abnormal            Final result                 Please view results for these tests on the individual orders.    CBC Auto Differential [[id number]]  (Abnormal) Collected: 07/05/21 2103    Specimen: Blood Updated: 07/05/21 2114     WBC 9.90 10*3/mm3      RBC 4.19 10*6/mm3      Hemoglobin 13.4 g/dL      Hematocrit 39.2 %      MCV 93.5 fL      MCH 31.9 pg      MCHC 34.1 g/dL      RDW 13.6 %      RDW-SD 44.6 fl      MPV 7.7 fL       Platelets 195 10*3/mm3      Neutrophil % 75.2 %      Lymphocyte % 10.7 %      Monocyte % 13.5 %      Eosinophil % 0.2 %      Basophil % 0.4 %      Neutrophils, Absolute 7.50 10*3/mm3      Lymphocytes, Absolute 1.10 10*3/mm3      Monocytes, Absolute 1.30 10*3/mm3      Eosinophils, Absolute 0.00 10*3/mm3      Basophils, Absolute 0.00 10*3/mm3      nRBC 0.0 /100 WBC     Blood Culture - Blood, Hand, Right [[id number]] Collected: 07/05/21 2103    Specimen: Blood from Hand, Right Updated: 07/05/21 2110    Blood Culture - Blood, Arm, Right [[id number]] Collected: 07/05/21 2103    Specimen: Blood from Arm, Right Updated: 07/05/21 2110    Urinalysis With Microscopic If Indicated (No Culture) - Urine, Clean Catch [[id number]]  (Normal) Collected: 07/05/21 1952    Specimen: Urine, Clean Catch Updated: 07/05/21 2015     Color, UA Yellow     Appearance, UA Clear     pH, UA 6.5     Specific Gravity, UA 1.014     Glucose, UA Negative     Ketones, UA Negative     Bilirubin, UA Negative     Blood, UA Negative     Protein, UA Negative     Leuk Esterase, UA Negative     Nitrite, UA Negative     Urobilinogen, UA 0.2 E.U./dL    Narrative:      Urine microscopic not indicated.        Imaging Results (Last 72 Hours)     Procedure Component Value Units Date/Time    XR Chest 1 View [[id number]] Collected: 07/05/21 2218     Updated: 07/05/21 2220    Narrative:      Frontal chest radiograph    Indication:  Fever    Comparison:  July 4, 2021    Findings:    No focal consolidation, pneumothorax, or pleural fluid collection seen. The cardiomediastinal silhouette is unchanged. No acute focal bony abnormality seen.      Impression:      Impression:    No acute cardiopulmonary process seen.    Electronically signed by:  Eligio Aquino M.D.    7/5/2021 8:19 PM    XR Chest 1 View [[id number]] Collected: 07/04/21 1757     Updated: 07/04/21 1802    Narrative:      Examination: XR CHEST 1 VW-     Date of Exam: 7/4/2021 3:05 PM     Indication: dyspnea;  I21.29-ST elevation (STEMI) myocardial infarction  involving other sites.       Comparison: None available.     Technique: 1 view of the chest      FINDINGS:  The heart size is within normal. Mediastinal and hilar contours are  unremarkable. There is minor right diaphragmatic elevation. There is a 3  mm nodular opacity in the right upper lung. There is no airspace  opacity. There are minor bandlike opacity in the left lower lung. No  pleural effusion or pneumothorax.       Impression:      1. Minor left basal opacity is likely atelectasis.  2. 3 mm right upper lung opacity is favored to be a granuloma in this  patient with no reported history of smoking.     Electronically Signed By-America Mccarthy MD On:7/4/2021 6:00 PM  This report was finalized on [id number] by  America Mccarthy MD.        ECG/EMG Results (most recent)     Procedure Component Value Units Date/Time    ECG 12 Lead [[id number]] Collected: 07/04/21 0324     Updated: 07/04/21 0326     QT Interval 414 ms     Narrative:      HEART RATE= 66  bpm  RR Interval= 912  ms  ND Interval= 179  ms  P Horizontal Axis= 22  deg  P Front Axis= 82  deg  QRSD Interval= 88  ms  QT Interval= 414  ms  QRS Axis= 58  deg  T Wave Axis= 38  deg  - ABNORMAL ECG -  Sinus rhythm  Evolving anterolateral infarct since 04-Jul-2021 0:54:06  When compared with ECG of 04-Jul-2021 0:54:06,  Significant rate increase  New or worsened ischemia or infarction  Electronically Signed By:   Date and Time of Study: 2021-07-04 03:24:11    ECG 12 Lead [[id number]] Collected: 07/04/21 0054     Updated: 07/04/21 0821     QT Interval 482 ms     Narrative:      HEART RATE= 42  bpm  RR Interval= 1420  ms  ND Interval= 175  ms  P Horizontal Axis= 0  deg  P Front Axis= 86  deg  QRSD Interval= 92  ms  QT Interval= 482  ms  QRS Axis= 8  deg  T Wave Axis= 3  deg  - ABNORMAL ECG -  Sinus bradycardia  Lateral infarct, acute (LAD)  No previous ECG available for comparison  Electronically Signed By: Jack Christianson  (BLAINE) 04-Jul-2021 08:21:12  Date and Time of Study: 2021-07-04 00:54:06    ECG 12 Lead [[id number]] Collected: 07/04/21 0829     Updated: 07/04/21 0830     QT Interval 478 ms     Narrative:      HEART RATE= 50  bpm  RR Interval= 1212  ms  W[place] Interval= 149  ms  P Horizontal Axis= 26  deg  P Front Axis= 56  deg  QRSD Interval= 89  ms  QT Interval= 478  ms  QRS Axis= 56  deg  T Wave Axis= 61  deg  - ABNORMAL ECG -  Sinus bradycardia  Evolution of Ant-Lat MI continues since 04-Jul-2021 0:54:06  When compared with ECG of 04-Jul-2021 3:24:11,  Nonspecific significant change  Electronically Signed By:   Date and Time of Study: 2021-07-04 08:29:53    Adult Transthoracic Echo Complete w/ Color, Spectral and Contrast if Necessary Per Protocol [[id number]] Collected: 07/04/21 0726     Updated: 07/04/21 0928     BSA 2.0 m^2      RVIDd 2.3 cm      IVSd 0.7 cm      LVIDd 4.4 cm      LVIDs 2.9 cm      LVPWd 0.87 cm      IVS/LVPW 0.8     FS 34.9 %      EDV(Teich) 89.3 ml      ESV(Teich) 31.9 ml      EF(Teich) 64.3 %      EDV(cubed) 87.2 ml      ESV(cubed) 24.1 ml      EF(cubed) 72.4 %      LV mass(C)d 108.2 grams      LV mass(C)dI 54.8 grams/m^2      SV(Teich) 57.5 ml      SI(Teich) 29.1 ml/m^2      SV(cubed) 63.1 ml      SI(cubed) 32.0 ml/m^2      Ao root diam 3.8 cm      Ao root area 11.5 cm^2      ACS 2.0 cm      LVOT diam 2.1 cm      LVOT area 3.4 cm^2      EDV(MOD-sp4) 91.0 ml      ESV(MOD-sp4) 40.0 ml      EF(MOD-sp4) 56.1 %      SV(MOD-sp4) 51.0 ml      SI(MOD-sp4) 25.9 ml/m^2      Ao root area (BSA corrected) 1.9     LV Avelar Vol (BSA corrected) 46.1 ml/m^2      LV Sys Vol (BSA corrected) 20.3 ml/m^2      Aortic R-R 1.2 sec      Aortic HR 50.2 BPM      MV E max julianne 89.4 cm/sec      MV A max julianne 60.7 cm/sec      MV E/A 1.5     MV V2 max 89.3 cm/sec      MV max PG 3.2 mmHg      MV V2 mean 44.4 cm/sec      MV mean PG 0.99 mmHg      MV V2 VTI 30.3 cm      MVA(VTI) 3.1 cm^2      MV dec slope 416.3 cm/sec^2      MV dec time 0.21 sec       Ao pk zion 124.4 cm/sec      Ao max PG 6.2 mmHg      Ao max PG (full) 0.79 mmHg      Ao V2 mean 96.8 cm/sec      Ao mean PG 4.0 mmHg      Ao mean PG (full) 1.0 mmHg      Ao V2 VTI 31.7 cm      NNAMDI(I,A) 3.0 cm^2      NNAMDI(I,D) 3.0 cm^2      NNAMDI(V,A) 3.1 cm^2      NNAMDI(V,D) 3.1 cm^2      LV V1 max PG 5.4 mmHg      LV V1 mean PG 3.0 mmHg      LV V1 max 116.2 cm/sec      LV V1 mean 81.8 cm/sec      LV V1 VTI 28.3 cm      CO(Ao) 18.2 l/min      CI(Ao) 9.2 l/min/m^2      SV(Ao) 363.0 ml      SI(Ao) 183.9 ml/m^2      CO(LVOT) 4.8 l/min      CI(LVOT) 2.4 l/min/m^2      SV(LVOT) 95.2 ml      SI(LVOT) 48.2 ml/m^2      PA V2 max 117.2 cm/sec      PA max PG 5.5 mmHg      PA max PG (full) 4.2 mmHg      RV V1 max PG 1.3 mmHg      RV V1 mean PG 0.66 mmHg      RV V1 max 56.4 cm/sec      RV V1 mean 38.7 cm/sec      RV V1 VTI 12.1 cm      TR max zion 179.3 cm/sec      RVSP(TR) 15.9 mmHg      RAP systole 3.0 mmHg      Pulm Sys Zion 51.2 cm/sec      Pulm Avelar Zion 37.3 cm/sec      Pulm S/D 1.4     Pulm A Revs Dur 0.08 sec      Pulm A Revs Zion 29.2 cm/sec       CV ECHO RAINA - BZI_BMI 29.6 kilograms/m^2       CV ECHO RAINA - BSA(HAYCOCK) 2.0 m^2       CV ECHO RAINA - BZI_METRIC_WEIGHT 85.7 kg       CV ECHO RAINA - BZI_METRIC_HEIGHT 170.2 cm      EF(MOD-bp) 56.0 %      LA dimension(2D) 2.8 cm     ECG 12 Lead [[id number]] Collected: 07/05/21 0545     Updated: 07/05/21 0548     QT Interval 384 ms     Narrative:      HEART RATE= 72  bpm  RR Interval= 840  ms  MI Interval= 143  ms  P Horizontal Axis= 14  deg  P Front Axis= 75  deg  QRSD Interval= 82  ms  QT Interval= 384  ms  QRS Axis= 94  deg  T Wave Axis= 83  deg  - ABNORMAL ECG -  Sinus rhythm  Anteroseptal infarct, old  Electronically Signed By:   Date and Time of Study: 2021-07-05 05:45:48    ECG 12 Lead [[id number]] Collected: 07/05/21 1836     Updated: 07/05/21 1837     QT Interval 313 ms     Narrative:      HEART RATE= 116  bpm  RR Interval= 518  ms  MI Interval=   ms  P Horizontal  Axis=   deg  P Front Axis=   deg  QRSD Interval= 78  ms  QT Interval= 313  ms  QRS Axis= 90  deg  T Wave Axis= 6  deg  - ABNORMAL ECG -  Atrial fibrillation  Anterolateral infarct, acute (LAD)  Electronically Signed By:   Date and Time of Study: 2021-07-05 18:36:02    ECG 12 Lead [[id number]] Collected: 07/06/21 0555     Updated: 07/06/21 0557     QT Interval 395 ms     Narrative:      HEART RATE= 65  bpm  RR Interval= 920  ms  LA Interval= 153  ms  P Horizontal Axis= -11  deg  P Front Axis= 62  deg  QRSD Interval= 84  ms  QT Interval= 395  ms  QRS Axis= 92  deg  T Wave Axis= 93  deg  - ABNORMAL ECG -  Sinus rhythm  Evolving anterolateral infarct since 05-Jul-2021 18:36:02  When compared with ECG of 05-Jul-2021 18:36:02,  Significant change in rhythm: previously atrial fibrillation  New or worsened ischemia or infarction  Electronically Signed By:   Date and Time of Study: 2021-07-06 05:55:12        CBC    Results from last 7 days   Lab Units 07/06/21 0023 07/05/21 2103 07/05/21 0355 07/04/21  0419 07/04/21  0104   WBC 10*3/mm3 10.10 9.90 9.80 10.70 6.50   HEMOGLOBIN g/dL 13.0 13.4 13.8 13.1 13.6   PLATELETS 10*3/mm3 187 195 210 217 251     BMP   Results from last 7 days   Lab Units 07/06/21 0023 07/05/21 0355 07/04/21 0419   SODIUM mmol/L 134* 134* 136   POTASSIUM mmol/L 3.9 4.0 4.0   CHLORIDE mmol/L 98 98 103   CO2 mmol/L 27.0 29.0 26.0   BUN mg/dL 11 12 13   CREATININE mg/dL 0.87 0.87 0.80   GLUCOSE mg/dL 116* 111* 136*   MAGNESIUM mg/dL 1.9 2.1  --    PHOSPHORUS mg/dL 2.4* 2.7  --      CMP   Results from last 7 days   Lab Units 07/06/21  0023 07/05/21 0355 07/04/21 0419   SODIUM mmol/L 134* 134* 136   POTASSIUM mmol/L 3.9 4.0 4.0   CHLORIDE mmol/L 98 98 103   CO2 mmol/L 27.0 29.0 26.0   BUN mg/dL 11 12 13   CREATININE mg/dL 0.87 0.87 0.80   GLUCOSE mg/dL 116* 111* 136*   ALBUMIN g/dL 3.70 4.20 4.10   BILIRUBIN mg/dL 0.5 0.4 0.4   ALK PHOS U/L 60 72 67   AST (SGOT) U/L 116* 219* 18   ALT (SGPT) U/L 31 42*  13     Cardiac Studies:  Echo-   Stress Myoview-  Cath-      Medication Review:   Scheduled Meds:amiodarone, 200 mg, Oral, Q12H  aspirin, 81 mg, Oral, Daily  clopidogrel, 75 mg, Oral, Daily  escitalopram, 20 mg, Oral, Daily  lisinopril, 2.5 mg, Oral, Q24H  metoprolol tartrate, 12.5 mg, Oral, Q12H  sodium chloride, 10 mL, Intravenous, Q12H      Continuous Infusions:   PRN Meds:.•  acetaminophen **OR** acetaminophen  •  acetaminophen  •  aluminum-magnesium hydroxide-simethicone  •  atropine  •  clonazePAM  •  fentaNYL citrate (PF)  •  morphine **OR** Morphine  •  ondansetron **OR** ondansetron  •  sodium chloride  •  sodium chloride  •  sodium chloride      Assessment/Plan   Patient Active Problem List   Diagnosis   • Acute lateral myocardial infarction (CMS/HCC)   • Anxiety   • PTSD (post-traumatic stress disorder)     MDM:    1.  Acute myocardial infarction:    Patient is doing well.  Troponins are noted to be in the range of 3.7.  They are trending down    2.  Cardiomyopathy:    I would start lisinopril and low-dose beta-blocker.    3.  CAD:    Patient has also high-grade stenosis of RCA.  I would proceed with PCI to RCA in 2 to 4 weeks.    4.  Atrial fibrillation:    Patient went into atrial fibrillation.  I would start amiodarone 200 mg twice daily.  Patient is converted into sinus rhythm after intravenous amiodarone.  Intravenous amiodarone would be discontinued    Jericho Lai MD  07/06/21  14:58 EDT

## 2021-07-07 ENCOUNTER — APPOINTMENT (OUTPATIENT)
Dept: CARDIOLOGY | Facility: HOSPITAL | Age: 61
End: 2021-07-07

## 2021-07-07 VITALS
WEIGHT: 191.58 LBS | HEART RATE: 68 BPM | RESPIRATION RATE: 18 BRPM | DIASTOLIC BLOOD PRESSURE: 71 MMHG | OXYGEN SATURATION: 97 % | TEMPERATURE: 99.5 F | HEIGHT: 67 IN | SYSTOLIC BLOOD PRESSURE: 106 MMHG | BODY MASS INDEX: 30.07 KG/M2

## 2021-07-07 PROBLEM — E78.5 HYPERLIPIDEMIA: Status: ACTIVE | Noted: 2021-07-07

## 2021-07-07 PROBLEM — I25.10 CAD (CORONARY ARTERY DISEASE): Status: ACTIVE | Noted: 2021-07-07

## 2021-07-07 PROBLEM — R50.9 FEVER: Status: ACTIVE | Noted: 2021-07-06

## 2021-07-07 LAB
ALBUMIN SERPL-MCNC: 3.6 G/DL (ref 3.5–5.2)
ALBUMIN/GLOB SERPL: 1.2 G/DL
ALP SERPL-CCNC: 60 U/L (ref 39–117)
ALT SERPL W P-5'-P-CCNC: 28 U/L (ref 1–41)
ANION GAP SERPL CALCULATED.3IONS-SCNC: 8 MMOL/L (ref 5–15)
AST SERPL-CCNC: 61 U/L (ref 1–40)
BASOPHILS # BLD AUTO: 0 10*3/MM3 (ref 0–0.2)
BASOPHILS NFR BLD AUTO: 0.5 % (ref 0–1.5)
BH CV LOWER VASCULAR LEFT COMMON FEMORAL AUGMENT: NORMAL
BH CV LOWER VASCULAR LEFT COMMON FEMORAL COMPETENT: NORMAL
BH CV LOWER VASCULAR LEFT COMMON FEMORAL COMPRESS: NORMAL
BH CV LOWER VASCULAR LEFT COMMON FEMORAL PHASIC: NORMAL
BH CV LOWER VASCULAR LEFT COMMON FEMORAL SPONT: NORMAL
BH CV LOWER VASCULAR LEFT DISTAL FEMORAL COMPRESS: NORMAL
BH CV LOWER VASCULAR LEFT GASTRONEMIUS COMPRESS: NORMAL
BH CV LOWER VASCULAR LEFT GREATER SAPH AK COMPRESS: NORMAL
BH CV LOWER VASCULAR LEFT GREATER SAPH BK COMPRESS: NORMAL
BH CV LOWER VASCULAR LEFT LESSER SAPH COMPRESS: NORMAL
BH CV LOWER VASCULAR LEFT MID FEMORAL AUGMENT: NORMAL
BH CV LOWER VASCULAR LEFT MID FEMORAL COMPETENT: NORMAL
BH CV LOWER VASCULAR LEFT MID FEMORAL COMPRESS: NORMAL
BH CV LOWER VASCULAR LEFT MID FEMORAL PHASIC: NORMAL
BH CV LOWER VASCULAR LEFT MID FEMORAL SPONT: NORMAL
BH CV LOWER VASCULAR LEFT PERONEAL COMPRESS: NORMAL
BH CV LOWER VASCULAR LEFT POPLITEAL AUGMENT: NORMAL
BH CV LOWER VASCULAR LEFT POPLITEAL COMPETENT: NORMAL
BH CV LOWER VASCULAR LEFT POPLITEAL COMPRESS: NORMAL
BH CV LOWER VASCULAR LEFT POPLITEAL PHASIC: NORMAL
BH CV LOWER VASCULAR LEFT POPLITEAL SPONT: NORMAL
BH CV LOWER VASCULAR LEFT POSTERIOR TIBIAL COMPRESS: NORMAL
BH CV LOWER VASCULAR LEFT PROXIMAL FEMORAL COMPRESS: NORMAL
BH CV LOWER VASCULAR LEFT SAPHENOFEMORAL JUNCTION COMPRESS: NORMAL
BH CV LOWER VASCULAR RIGHT COMMON FEMORAL AUGMENT: NORMAL
BH CV LOWER VASCULAR RIGHT COMMON FEMORAL COMPETENT: NORMAL
BH CV LOWER VASCULAR RIGHT COMMON FEMORAL COMPRESS: NORMAL
BH CV LOWER VASCULAR RIGHT COMMON FEMORAL PHASIC: NORMAL
BH CV LOWER VASCULAR RIGHT COMMON FEMORAL SPONT: NORMAL
BH CV LOWER VASCULAR RIGHT DISTAL FEMORAL COMPRESS: NORMAL
BH CV LOWER VASCULAR RIGHT GASTRONEMIUS COMPRESS: NORMAL
BH CV LOWER VASCULAR RIGHT GREATER SAPH AK COMPRESS: NORMAL
BH CV LOWER VASCULAR RIGHT GREATER SAPH BK COMPRESS: NORMAL
BH CV LOWER VASCULAR RIGHT LESSER SAPH COMPRESS: NORMAL
BH CV LOWER VASCULAR RIGHT MID FEMORAL AUGMENT: NORMAL
BH CV LOWER VASCULAR RIGHT MID FEMORAL COMPETENT: NORMAL
BH CV LOWER VASCULAR RIGHT MID FEMORAL COMPRESS: NORMAL
BH CV LOWER VASCULAR RIGHT MID FEMORAL PHASIC: NORMAL
BH CV LOWER VASCULAR RIGHT MID FEMORAL SPONT: NORMAL
BH CV LOWER VASCULAR RIGHT PERONEAL COMPRESS: NORMAL
BH CV LOWER VASCULAR RIGHT POPLITEAL AUGMENT: NORMAL
BH CV LOWER VASCULAR RIGHT POPLITEAL COMPETENT: NORMAL
BH CV LOWER VASCULAR RIGHT POPLITEAL COMPRESS: NORMAL
BH CV LOWER VASCULAR RIGHT POPLITEAL PHASIC: NORMAL
BH CV LOWER VASCULAR RIGHT POPLITEAL SPONT: NORMAL
BH CV LOWER VASCULAR RIGHT POSTERIOR TIBIAL COMPRESS: NORMAL
BH CV LOWER VASCULAR RIGHT PROXIMAL FEMORAL COMPRESS: NORMAL
BH CV LOWER VASCULAR RIGHT SAPHENOFEMORAL JUNCTION COMPRESS: NORMAL
BILIRUB SERPL-MCNC: 0.5 MG/DL (ref 0–1.2)
BUN SERPL-MCNC: 10 MG/DL (ref 6–20)
BUN/CREAT SERPL: 11.5 (ref 7–25)
CALCIUM SPEC-SCNC: 9 MG/DL (ref 8.6–10.5)
CHLORIDE SERPL-SCNC: 98 MMOL/L (ref 98–107)
CO2 SERPL-SCNC: 28 MMOL/L (ref 22–29)
CREAT SERPL-MCNC: 0.87 MG/DL (ref 0.76–1.27)
DEPRECATED RDW RBC AUTO: 44.6 FL (ref 37–54)
EOSINOPHIL # BLD AUTO: 0 10*3/MM3 (ref 0–0.4)
EOSINOPHIL NFR BLD AUTO: 0.2 % (ref 0.3–6.2)
ERYTHROCYTE [DISTWIDTH] IN BLOOD BY AUTOMATED COUNT: 13.7 % (ref 12.3–15.4)
GFR SERPL CREATININE-BSD FRML MDRD: 90 ML/MIN/1.73
GLOBULIN UR ELPH-MCNC: 3 GM/DL
GLUCOSE BLDC GLUCOMTR-MCNC: 113 MG/DL (ref 70–105)
GLUCOSE BLDC GLUCOMTR-MCNC: 120 MG/DL (ref 70–105)
GLUCOSE SERPL-MCNC: 120 MG/DL (ref 65–99)
HCT VFR BLD AUTO: 36.3 % (ref 37.5–51)
HGB BLD-MCNC: 12.2 G/DL (ref 13–17.7)
LYMPHOCYTES # BLD AUTO: 1 10*3/MM3 (ref 0.7–3.1)
LYMPHOCYTES NFR BLD AUTO: 10.6 % (ref 19.6–45.3)
MAGNESIUM SERPL-MCNC: 2.1 MG/DL (ref 1.6–2.6)
MAXIMAL PREDICTED HEART RATE: 161 BPM
MCH RBC QN AUTO: 31.4 PG (ref 26.6–33)
MCHC RBC AUTO-ENTMCNC: 33.7 G/DL (ref 31.5–35.7)
MCV RBC AUTO: 93.4 FL (ref 79–97)
MONOCYTES # BLD AUTO: 1.4 10*3/MM3 (ref 0.1–0.9)
MONOCYTES NFR BLD AUTO: 15.3 % (ref 5–12)
NEUTROPHILS NFR BLD AUTO: 6.6 10*3/MM3 (ref 1.7–7)
NEUTROPHILS NFR BLD AUTO: 73.4 % (ref 42.7–76)
NRBC BLD AUTO-RTO: 0 /100 WBC (ref 0–0.2)
PHOSPHATE SERPL-MCNC: 2.9 MG/DL (ref 2.5–4.5)
PLATELET # BLD AUTO: 182 10*3/MM3 (ref 140–450)
PMV BLD AUTO: 8 FL (ref 6–12)
POTASSIUM SERPL-SCNC: 4.3 MMOL/L (ref 3.5–5.2)
PROT SERPL-MCNC: 6.6 G/DL (ref 6–8.5)
RBC # BLD AUTO: 3.89 10*6/MM3 (ref 4.14–5.8)
SODIUM SERPL-SCNC: 134 MMOL/L (ref 136–145)
STRESS TARGET HR: 137 BPM
WBC # BLD AUTO: 9 10*3/MM3 (ref 3.4–10.8)

## 2021-07-07 PROCEDURE — 93970 EXTREMITY STUDY: CPT

## 2021-07-07 PROCEDURE — 82962 GLUCOSE BLOOD TEST: CPT

## 2021-07-07 PROCEDURE — 87070 CULTURE OTHR SPECIMN AEROBIC: CPT | Performed by: INTERNAL MEDICINE

## 2021-07-07 PROCEDURE — 87205 SMEAR GRAM STAIN: CPT | Performed by: INTERNAL MEDICINE

## 2021-07-07 PROCEDURE — 80053 COMPREHEN METABOLIC PANEL: CPT | Performed by: NURSE PRACTITIONER

## 2021-07-07 PROCEDURE — 99232 SBSQ HOSP IP/OBS MODERATE 35: CPT | Performed by: NURSE PRACTITIONER

## 2021-07-07 PROCEDURE — 85025 COMPLETE CBC W/AUTO DIFF WBC: CPT | Performed by: NURSE PRACTITIONER

## 2021-07-07 PROCEDURE — 84100 ASSAY OF PHOSPHORUS: CPT | Performed by: NURSE PRACTITIONER

## 2021-07-07 PROCEDURE — 83735 ASSAY OF MAGNESIUM: CPT | Performed by: NURSE PRACTITIONER

## 2021-07-07 RX ORDER — ATORVASTATIN CALCIUM 20 MG/1
20 TABLET, FILM COATED ORAL NIGHTLY
Qty: 30 TABLET | Refills: 3 | Status: SHIPPED | OUTPATIENT
Start: 2021-07-07 | End: 2021-08-06

## 2021-07-07 RX ORDER — CLOPIDOGREL BISULFATE 75 MG/1
75 TABLET ORAL DAILY
Qty: 30 TABLET | Refills: 5 | Status: SHIPPED | OUTPATIENT
Start: 2021-07-08 | End: 2022-01-10 | Stop reason: SDUPTHER

## 2021-07-07 RX ORDER — AMIODARONE HYDROCHLORIDE 200 MG/1
200 TABLET ORAL EVERY 12 HOURS SCHEDULED
Qty: 60 TABLET | Refills: 3 | Status: SHIPPED | OUTPATIENT
Start: 2021-07-07 | End: 2021-08-06

## 2021-07-07 RX ORDER — ASPIRIN 81 MG/1
81 TABLET ORAL DAILY
Qty: 30 TABLET | Refills: 3 | Status: SHIPPED | OUTPATIENT
Start: 2021-07-08 | End: 2021-11-01 | Stop reason: SDUPTHER

## 2021-07-07 RX ORDER — POLYETHYLENE GLYCOL 3350 17 G/17G
17 POWDER, FOR SOLUTION ORAL ONCE
Status: DISCONTINUED | OUTPATIENT
Start: 2021-07-07 | End: 2021-07-07 | Stop reason: HOSPADM

## 2021-07-07 RX ORDER — ATORVASTATIN CALCIUM 20 MG/1
20 TABLET, FILM COATED ORAL NIGHTLY
Status: DISCONTINUED | OUTPATIENT
Start: 2021-07-07 | End: 2021-07-07 | Stop reason: HOSPADM

## 2021-07-07 RX ORDER — LISINOPRIL 2.5 MG/1
2.5 TABLET ORAL
Qty: 30 TABLET | Refills: 3 | Status: SHIPPED | OUTPATIENT
Start: 2021-07-08 | End: 2021-11-06 | Stop reason: SDUPTHER

## 2021-07-07 RX ADMIN — AMIODARONE HYDROCHLORIDE 200 MG: 200 TABLET ORAL at 08:40

## 2021-07-07 RX ADMIN — METOPROLOL TARTRATE 12.5 MG: 25 TABLET, FILM COATED ORAL at 08:37

## 2021-07-07 RX ADMIN — CLOPIDOGREL BISULFATE 75 MG: 75 TABLET ORAL at 08:33

## 2021-07-07 RX ADMIN — ASPIRIN 81 MG: 81 TABLET, COATED ORAL at 08:33

## 2021-07-07 RX ADMIN — ESCITALOPRAM OXALATE 20 MG: 10 TABLET ORAL at 08:33

## 2021-07-07 RX ADMIN — DOCUSATE SODIUM 100 MG: 100 CAPSULE, LIQUID FILLED ORAL at 08:33

## 2021-07-07 RX ADMIN — ACETAMINOPHEN 650 MG: 325 TABLET, FILM COATED ORAL at 04:03

## 2021-07-07 RX ADMIN — Medication 10 ML: at 08:34

## 2021-07-07 RX ADMIN — LISINOPRIL 2.5 MG: 5 TABLET ORAL at 08:40

## 2021-07-07 NOTE — CASE MANAGEMENT/SOCIAL WORK
Continued Stay Note  [facility]     Patient Name: Brayden Rainey  MRN: [id number]  Today's Date: 7/7/2021    Admit Date: 7/4/2021    Discharge Plan     Row Name 07/07/21 1612       Plan    Plan  D/C Plan : Home    Plan Comments  SW sent secure chat to RN stating that she is following for d/c medications and pt has requested to know the difference between cost of meds if sent to [facility] in [place] vs meds to bed at [facility]. DESHAUN informed by RN that pt will d/c today on aspirin, plavix, amiodorone, lopressor, lisinopril, lipitor, and lexapro. SW checked price of these medications online via "[employer] for [facility] [place] (total cost $161.92) and by phone via [facility] retail pharmacy, s/w Carolyn (total cost $55.13). Carolyn ([facility]) advised that it would be cheaper in the long run for pt to buy his aspirin OTC rather than in 30-day increments. DESHAUN called pt to update on all of the above information. Pt reports he would like to sign up for meds to bed program and states he has enough funds to cover cost of meds at [facility] - states he would like to purchase his aspirin OTC as recommended by pharmacist. Pt also states that he misplaced his FAA from [facility]. DESHAUN updated pt's pharmacy to [facility] and enrolled in Meds to Bed on Epic. DESHAUN updated bedside RN of pt elections via [employer]. SW delivered new FAA to bedside, gave to pt's sister.        Met with patient in room wearing PPE: mask, face shield/goggles.      Maintained distance greater than six feet and spent less than 15 minutes in the room.      Arleth Zaman, [facility] [place]    Office: [phone]  Cell: [phone]  Fax: [phone]  E-mail: [email or web address]

## 2021-07-07 NOTE — PROGRESS NOTES
LOS: 3 days   Admiting Physician- Virginia Kumar MD    Reason For Followup:    Acute anterolateral myocardial infarction  CAD  Cardiomyopathy    Subjective     No chest pain.    Objective     Hemodynamics are stable    Review of Systems:   Review of Systems   Constitutional: Negative for chills and fever.   HENT: Negative for ear discharge and nosebleeds.    Eyes: Negative for discharge and redness.   Cardiovascular: Negative for chest pain, orthopnea, palpitations, paroxysmal nocturnal dyspnea and syncope.   Respiratory: Negative for cough, shortness of breath and wheezing.    Endocrine: Negative for heat intolerance.   Skin: Negative for rash.   Musculoskeletal: Negative for arthritis and myalgias.   Gastrointestinal: Negative for abdominal pain, melena, nausea and vomiting.   Genitourinary: Negative for dysuria and hematuria.   Neurological: Negative for dizziness, light-headedness, numbness and tremors.   Psychiatric/Behavioral: Negative for depression. The patient is not nervous/anxious.          Vital Signs  Vitals:    07/07/21 0405 07/07/21 0600 07/07/21 0605 07/07/21 0800   BP: 115/70  106/71    BP Location:       Patient Position:       Pulse: 73  68    Resp: 18      Temp:    97.7 °F (36.5 °C)   TempSrc:    Oral   SpO2: 95%  97%    Weight:  86.9 kg (191 lb 9.3 oz)     Height:         Wt Readings from Last 1 Encounters:   07/07/21 86.9 kg (191 lb 9.3 oz)       Intake/Output Summary (Last 24 hours) at 7/7/2021 0926  Last data filed at 7/7/2021 0921  Gross per 24 hour   Intake 1000 ml   Output 300 ml   Net 700 ml     Physical Exam:  Constitutional:       Appearance: Well-developed.   Eyes:      General: No scleral icterus.        Right eye: No discharge.   HENT:      Head: Normocephalic and atraumatic.   Neck:      Thyroid: No thyromegaly.      Lymphadenopathy: No cervical adenopathy.   Pulmonary:      Effort: Pulmonary effort is normal. No respiratory distress.      Breath sounds: Normal breath sounds. No  wheezing. No rales.   Cardiovascular:      Normal rate. Regular rhythm.      No gallop.   Edema:     Peripheral edema absent.   Abdominal:      Tenderness: There is no abdominal tenderness.   Skin:     Findings: No erythema or rash.   Neurological:      Mental Status: Alert and oriented to person, place, and time.         Results Review:   Lab Results (last 24 hours)     Procedure Component Value Units Date/Time    POC Glucose Once [[id number]]  (Abnormal) Collected: 07/07/21 0723    Specimen: Blood Updated: 07/07/21 0724     Glucose 113 mg/dL      Comment: Serial Number: [id number]:  [id number]       Comprehensive Metabolic Panel [[id number]]  (Abnormal) Collected: 07/07/21 0606    Specimen: Blood Updated: 07/07/21 0649     Glucose 120 mg/dL      BUN 10 mg/dL      Creatinine 0.87 mg/dL      Sodium 134 mmol/L      Potassium 4.3 mmol/L      Chloride 98 mmol/L      CO2 28.0 mmol/L      Calcium 9.0 mg/dL      Total Protein 6.6 g/dL      Albumin 3.60 g/dL      ALT (SGPT) 28 U/L      AST (SGOT) 61 U/L      Alkaline Phosphatase 60 U/L      Total Bilirubin 0.5 mg/dL      eGFR Non African Amer 90 mL/min/1.73      Globulin 3.0 gm/dL      A/G Ratio 1.2 g/dL      BUN/Creatinine Ratio 11.5     Anion Gap 8.0 mmol/L     Narrative:      GFR Normal >60  Chronic Kidney Disease <60  Kidney Failure <15      Phosphorus [[id number]]  (Normal) Collected: 07/07/21 0606    Specimen: Blood Updated: 07/07/21 0649     Phosphorus 2.9 mg/dL     Magnesium [[id number]]  (Normal) Collected: 07/07/21 0606    Specimen: Blood Updated: 07/07/21 0649     Magnesium 2.1 mg/dL     CBC & Differential [[id number]]  (Abnormal) Collected: 07/07/21 0606    Specimen: Blood Updated: 07/07/21 0633    Narrative:      The following orders were created for panel order CBC & Differential.  Procedure                               Abnormality         Status                     ---------                               -----------         ------                     CBC  Auto Differential[[id number]]        Abnormal            Final result                 Please view results for these tests on the individual orders.    CBC Auto Differential [[id number]]  (Abnormal) Collected: 07/07/21 0606    Specimen: Blood Updated: 07/07/21 0633     WBC 9.00 10*3/mm3      RBC 3.89 10*6/mm3      Hemoglobin 12.2 g/dL      Hematocrit 36.3 %      MCV 93.4 fL      MCH 31.4 pg      MCHC 33.7 g/dL      RDW 13.7 %      RDW-SD 44.6 fl      MPV 8.0 fL      Platelets 182 10*3/mm3      Neutrophil % 73.4 %      Lymphocyte % 10.6 %      Monocyte % 15.3 %      Eosinophil % 0.2 %      Basophil % 0.5 %      Neutrophils, Absolute 6.60 10*3/mm3      Lymphocytes, Absolute 1.00 10*3/mm3      Monocytes, Absolute 1.40 10*3/mm3      Eosinophils, Absolute 0.00 10*3/mm3      Basophils, Absolute 0.00 10*3/mm3      nRBC 0.0 /100 WBC     Blood Culture - Blood, Arm, Right [[id number]] Collected: 07/05/21 2103    Specimen: Blood from Arm, Right Updated: 07/06/21 2115     Blood Culture No growth at 24 hours    Blood Culture - Blood, Hand, Right [[id number]] Collected: 07/05/21 2103    Specimen: Blood from Hand, Right Updated: 07/06/21 2115     Blood Culture No growth at 24 hours    POC Glucose Once [[id number]]  (Abnormal) Collected: 07/06/21 1954    Specimen: Blood Updated: 07/06/21 1955     Glucose 138 mg/dL      Comment: Serial Number: [id number]:  [id number]       POC Glucose Once [[id number]]  (Normal) Collected: 07/06/21 1605    Specimen: Blood Updated: 07/06/21 1606     Glucose 100 mg/dL      Comment: Serial Number: [id number]:  [id number]           Imaging Results (Last 72 Hours)     Procedure Component Value Units Date/Time    XR Chest 1 View [[id number]] Collected: 07/05/21 2218     Updated: 07/05/21 2220    Narrative:      Frontal chest radiograph    Indication:  Fever    Comparison:  July 4, 2021    Findings:    No focal consolidation, pneumothorax, or pleural fluid collection seen. The cardiomediastinal  silhouette is unchanged. No acute focal bony abnormality seen.      Impression:      Impression:    No acute cardiopulmonary process seen.    Electronically signed by:  Eligio Aquino M.D.    7/5/2021 8:19 PM    XR Chest 1 View [[id number]] Collected: 07/04/21 1757     Updated: 07/04/21 1802    Narrative:      Examination: XR CHEST 1 VW-     Date of Exam: 7/4/2021 3:05 PM     Indication: dyspnea; I21.29-ST elevation (STEMI) myocardial infarction  involving other sites.       Comparison: None available.     Technique: 1 view of the chest      FINDINGS:  The heart size is within normal. Mediastinal and hilar contours are  unremarkable. There is minor right diaphragmatic elevation. There is a 3  mm nodular opacity in the right upper lung. There is no airspace  opacity. There are minor bandlike opacity in the left lower lung. No  pleural effusion or pneumothorax.       Impression:      1. Minor left basal opacity is likely atelectasis.  2. 3 mm right upper lung opacity is favored to be a granuloma in this  patient with no reported history of smoking.     Electronically Signed By-America Mccarthy MD On:7/4/2021 6:00 PM  This report was finalized on [id number] by  America Mccarthy MD.        ECG/EMG Results (most recent)     Procedure Component Value Units Date/Time    ECG 12 Lead [[id number]] Collected: 07/04/21 0324     Updated: 07/04/21 0326     QT Interval 414 ms     Narrative:      HEART RATE= 66  bpm  RR Interval= 912  ms  IA Interval= 179  ms  P Horizontal Axis= 22  deg  P Front Axis= 82  deg  QRSD Interval= 88  ms  QT Interval= 414  ms  QRS Axis= 58  deg  T Wave Axis= 38  deg  - ABNORMAL ECG -  Sinus rhythm  Evolving anterolateral infarct since 04-Jul-2021 0:54:06  When compared with ECG of 04-Jul-2021 0:54:06,  Significant rate increase  New or worsened ischemia or infarction  Electronically Signed By:   Date and Time of Study: 2021-07-04 03:24:11    ECG 12 Lead [[id number]] Collected: 07/04/21 0054     Updated:  07/04/21 0821     QT Interval 482 ms     Narrative:      HEART RATE= 42  bpm  RR Interval= 1420  ms  VA Interval= 175  ms  P Horizontal Axis= 0  deg  P Front Axis= 86  deg  QRSD Interval= 92  ms  QT Interval= 482  ms  QRS Axis= 8  deg  T Wave Axis= 3  deg  - ABNORMAL ECG -  Sinus bradycardia  Lateral infarct, acute (LAD)  No previous ECG available for comparison  Electronically Signed By: Jack Christianson (BLAINE) 04-Jul-2021 08:21:12  Date and Time of Study: 2021-07-04 00:54:06    ECG 12 Lead [[id number]] Collected: 07/04/21 0829     Updated: 07/04/21 0830     QT Interval 478 ms     Narrative:      HEART RATE= 50  bpm  RR Interval= 1212  ms  VA Interval= 149  ms  P Horizontal Axis= 26  deg  P Front Axis= 56  deg  QRSD Interval= 89  ms  QT Interval= 478  ms  QRS Axis= 56  deg  T Wave Axis= 61  deg  - ABNORMAL ECG -  Sinus bradycardia  Evolution of Ant-Lat MI continues since 04-Jul-2021 0:54:06  When compared with ECG of 04-Jul-2021 3:24:11,  Nonspecific significant change  Electronically Signed By:   Date and Time of Study: 2021-07-04 08:29:53    Adult Transthoracic Echo Complete w/ Color, Spectral and Contrast if Necessary Per Protocol [[id number]] Collected: 07/04/21 0726     Updated: 07/04/21 0928     BSA 2.0 m^2      RVIDd 2.3 cm      IVSd 0.7 cm      LVIDd 4.4 cm      LVIDs 2.9 cm      LVPWd 0.87 cm      IVS/LVPW 0.8     FS 34.9 %      EDV(Teich) 89.3 ml      ESV(Teich) 31.9 ml      EF(Teich) 64.3 %      EDV(cubed) 87.2 ml      ESV(cubed) 24.1 ml      EF(cubed) 72.4 %      LV mass(C)d 108.2 grams      LV mass(C)dI 54.8 grams/m^2      SV(Teich) 57.5 ml      SI(Teich) 29.1 ml/m^2      SV(cubed) 63.1 ml      SI(cubed) 32.0 ml/m^2      Ao root diam 3.8 cm      Ao root area 11.5 cm^2      ACS 2.0 cm      LVOT diam 2.1 cm      LVOT area 3.4 cm^2      EDV(MOD-sp4) 91.0 ml      ESV(MOD-sp4) 40.0 ml      EF(MOD-sp4) 56.1 %      SV(MOD-sp4) 51.0 ml      SI(MOD-sp4) 25.9 ml/m^2      Ao root area (BSA corrected) 1.9     LV Avelar Vol  (BSA corrected) 46.1 ml/m^2      LV Sys Vol (BSA corrected) 20.3 ml/m^2      Aortic R-R 1.2 sec      Aortic HR 50.2 BPM      MV E max zion 89.4 cm/sec      MV A max zion 60.7 cm/sec      MV E/A 1.5     MV V2 max 89.3 cm/sec      MV max PG 3.2 mmHg      MV V2 mean 44.4 cm/sec      MV mean PG 0.99 mmHg      MV V2 VTI 30.3 cm      MVA(VTI) 3.1 cm^2      MV dec slope 416.3 cm/sec^2      MV dec time 0.21 sec      Ao pk zion 124.4 cm/sec      Ao max PG 6.2 mmHg      Ao max PG (full) 0.79 mmHg      Ao V2 mean 96.8 cm/sec      Ao mean PG 4.0 mmHg      Ao mean PG (full) 1.0 mmHg      Ao V2 VTI 31.7 cm      NNAMDI(I,A) 3.0 cm^2      NNAMDI(I,D) 3.0 cm^2      NNAMDI(V,A) 3.1 cm^2      NNAMDI(V,D) 3.1 cm^2      LV V1 max PG 5.4 mmHg      LV V1 mean PG 3.0 mmHg      LV V1 max 116.2 cm/sec      LV V1 mean 81.8 cm/sec      LV V1 VTI 28.3 cm      CO(Ao) 18.2 l/min      CI(Ao) 9.2 l/min/m^2      SV(Ao) 363.0 ml      SI(Ao) 183.9 ml/m^2      CO(LVOT) 4.8 l/min      CI(LVOT) 2.4 l/min/m^2      SV(LVOT) 95.2 ml      SI(LVOT) 48.2 ml/m^2      PA V2 max 117.2 cm/sec      PA max PG 5.5 mmHg      PA max PG (full) 4.2 mmHg      RV V1 max PG 1.3 mmHg      RV V1 mean PG 0.66 mmHg      RV V1 max 56.4 cm/sec      RV V1 mean 38.7 cm/sec      RV V1 VTI 12.1 cm      TR max zion 179.3 cm/sec      RVSP(TR) 15.9 mmHg      RAP systole 3.0 mmHg      Pulm Sys Zion 51.2 cm/sec      Pulm Avelar Zion 37.3 cm/sec      Pulm S/D 1.4     Pulm A Revs Dur 0.08 sec      Pulm A Revs Zion 29.2 cm/sec      BH CV ECHO RAINA - BZI_BMI 29.6 kilograms/m^2      BH CV ECHO RAINA - BSA(HAYCOCK) 2.0 m^2       CV ECHO RAINA - BZI_METRIC_WEIGHT 85.7 kg       CV ECHO RAINA - BZI_METRIC_HEIGHT 170.2 cm      EF(MOD-bp) 56.0 %      LA dimension(2D) 2.8 cm     ECG 12 Lead [[id number]] Collected: 07/05/21 0545     Updated: 07/05/21 0548     QT Interval 384 ms     Narrative:      HEART RATE= 72  bpm  RR Interval= 840  ms  NJ Interval= 143  ms  P Horizontal Axis= 14  deg  P Front Axis= 75  deg  QRSD  Interval= 82  ms  QT Interval= 384  ms  QRS Axis= 94  deg  T Wave Axis= 83  deg  - ABNORMAL ECG -  Sinus rhythm  Anteroseptal infarct, old  Electronically Signed By:   Date and Time of Study: 2021-07-05 05:45:48    ECG 12 Lead [[id number]] Collected: 07/05/21 1836     Updated: 07/05/21 1837     QT Interval 313 ms     Narrative:      HEART RATE= 116  bpm  RR Interval= 518  ms  SC Interval=   ms  P Horizontal Axis=   deg  P Front Axis=   deg  QRSD Interval= 78  ms  QT Interval= 313  ms  QRS Axis= 90  deg  T Wave Axis= 6  deg  - ABNORMAL ECG -  Atrial fibrillation  Anterolateral infarct, acute (LAD)  Electronically Signed By:   Date and Time of Study: 2021-07-05 18:36:02    ECG 12 Lead [[id number]] Collected: 07/06/21 0555     Updated: 07/06/21 0557     QT Interval 395 ms     Narrative:      HEART RATE= 65  bpm  RR Interval= 920  ms  SC Interval= 153  ms  P Horizontal Axis= -11  deg  P Front Axis= 62  deg  QRSD Interval= 84  ms  QT Interval= 395  ms  QRS Axis= 92  deg  T Wave Axis= 93  deg  - ABNORMAL ECG -  Sinus rhythm  Evolving anterolateral infarct since 05-Jul-2021 18:36:02  When compared with ECG of 05-Jul-2021 18:36:02,  Significant change in rhythm: previously atrial fibrillation  New or worsened ischemia or infarction  Electronically Signed By:   Date and Time of Study: 2021-07-06 05:55:12        CBC    Results from last 7 days   Lab Units 07/07/21  0606 07/06/21  0023 07/05/21 2103 07/05/21  0355 07/04/21  0419 07/04/21  0104   WBC 10*3/mm3 9.00 10.10 9.90 9.80 10.70 6.50   HEMOGLOBIN g/dL 12.2* 13.0 13.4 13.8 13.1 13.6   PLATELETS 10*3/mm3 182 187 195 210 217 251     BMP   Results from last 7 days   Lab Units 07/07/21  0606 07/06/21  0023 07/05/21  0355 07/04/21  0419   SODIUM mmol/L 134* 134* 134* 136   POTASSIUM mmol/L 4.3 3.9 4.0 4.0   CHLORIDE mmol/L 98 98 98 103   CO2 mmol/L 28.0 27.0 29.0 26.0   BUN mg/dL 10 11 12 13   CREATININE mg/dL 0.87 0.87 0.87 0.80   GLUCOSE mg/dL 120* 116* 111* 136*   MAGNESIUM  mg/dL 2.1 1.9 2.1  --    PHOSPHORUS mg/dL 2.9 2.4* 2.7  --      CMP   Results from last 7 days   Lab Units 07/07/21  0606 07/06/21  0023 07/05/21  0355 07/04/21  0419   SODIUM mmol/L 134* 134* 134* 136   POTASSIUM mmol/L 4.3 3.9 4.0 4.0   CHLORIDE mmol/L 98 98 98 103   CO2 mmol/L 28.0 27.0 29.0 26.0   BUN mg/dL 10 11 12 13   CREATININE mg/dL 0.87 0.87 0.87 0.80   GLUCOSE mg/dL 120* 116* 111* 136*   ALBUMIN g/dL 3.60 3.70 4.20 4.10   BILIRUBIN mg/dL 0.5 0.5 0.4 0.4   ALK PHOS U/L 60 60 72 67   AST (SGOT) U/L 61* 116* 219* 18   ALT (SGPT) U/L 28 31 42* 13     Cardiac Studies:  Echo-   Stress Myoview-  Cath-      Medication Review:   Scheduled Meds:amiodarone, 200 mg, Oral, Q12H  aspirin, 81 mg, Oral, Daily  clopidogrel, 75 mg, Oral, Daily  docusate sodium, 100 mg, Oral, Daily  escitalopram, 20 mg, Oral, Daily  lisinopril, 2.5 mg, Oral, Q24H  metoprolol tartrate, 12.5 mg, Oral, Q12H  polyethylene glycol, 17 g, Oral, Daily  sodium chloride, 10 mL, Intravenous, Q12H      Continuous Infusions:   PRN Meds:.•  acetaminophen **OR** acetaminophen  •  acetaminophen  •  aluminum-magnesium hydroxide-simethicone  •  atropine  •  bisacodyl  •  clonazePAM  •  fentaNYL citrate (PF)  •  morphine **OR** Morphine  •  ondansetron **OR** ondansetron  •  sodium chloride  •  sodium chloride  •  sodium chloride      Assessment/Plan   Patient Active Problem List   Diagnosis   • Acute lateral myocardial infarction (CMS/HCC)   • Anxiety   • PTSD (post-traumatic stress disorder)     MDM:    1.  Acute myocardial infarction:    Patient is doing well.  Troponins are noted to be in the range of 3.7.  They are trending down    2.  Cardiomyopathy:    I would start lisinopril and low-dose beta-blocker.    3.  CAD:    Patient has also high-grade stenosis of RCA.  I would proceed with PCI to RCA in 2 to 4 weeks.    4.  Atrial fibrillation:    Patient went into atrial fibrillation. Continue amiodarone 200 mg twice daily.  Patient is converted into sinus  rhythm after intravenous amiodarone.     Continue DAPT, BB, ACE-I, add statin    OK for d/c home this afternoon  Additional recommendations per Dr. Ming Suarez, FRANCIA  07/07/21  09:26 EDT

## 2021-07-07 NOTE — DISCHARGE SUMMARY
PULMONARY/CRITICAL CARE DISCHARGE SUMMARY NOTE           PATIENT NAME:  Brayden Rainey             :  1960            MRN:  [id number]    DATE OF ADMISSION: 2021     DATE OF DISCHARGE: 2021    DISCHARGE DIAGNOSES:   Acute lateral myocardial infarction  Anxiety  PTSD  Fever of unknown origin, sepsis not suspected, possible post-procedural  Constipation, resolved    PRESENTING PROBLEM/ADMISSION DIAGNOSES:  Acute lateral myocardial infarction  Anxiety  PTSD      HOSPITAL COURSE  Brayden Rainey is a 60 y.o. male  has a past medical history of Anxiety (2021) and PTSD (post-traumatic stress disorder) (2021).   Patient presented to [facility] on 2021 with complaint of chest pain.  Patient stated that he was at [employer] earlier this evening, and had sudden onset of chest pain that radiated down his left arm and up his neck and jaw.  Patient was diaphoretic at the time.  Patient described his pain as crushing, with no precipitating or alleviating factors.  EMS was called, and EKG obtained showed acute STEMI.  Patient was brought to [facility] ER and Cath Lab was activated.  Patient was found to have an acute anterior lateral myocardial infarction.  Cardiac cath by Dr. Lai, PCI to LAD with drug-eluting stent completed, stenosis was decreased from 80 to 90% to less than 10%.  Patient had 90-95% occlusion of LAD and 70-80% occlusion of RCA.  Per Dr. Lai there were small diagonal branches at the origin of lesion of the LAD, that were not able to open.  EF was found to be 45%.  Patient became bradycardic during cardiac catheterization and given atropine and started on dopamine.     Patient was admitted to ICU after cardiac cath, arterial sheath was still in place at the time.  Patient denies shortness of air, palpitations, dizziness or syncope, headache, chills, or fever.  Denies abdominal pain, nausea, vomiting, diarrhea, constipation, loss of weight or loss of appetite, dysuria,  blood in urine or stool.  CBC was obtained in ED which was unremarkable.     Pulmonary/Intensivist service was contacted for admission to ICU and further evaluation and treatment.  Of note, patient also developed atrial fibrillation with RVR, in which she was started on amiodarone, and quickly transitioned to oral amiodarone.      7/5: No acute events overnight.  No further bradycardia.  Patient denies chest pain or shortness of air.  7/6: No acute events overnight, on amiodarone drip, denies chest pain, shortness of breath.  Did spike a fever of 102 overnight, without signs of sepsis.  7/7: No acute events overnight.  T. Max 101.3 overnight.  Constipation resolved, denies shortness of breath, chest pain, nausea, vomiting, abdominal pain.      Extensive discussion with patient and his family member at bedside prior to discharge.  Reviewed patient's venous duplex results which were negative bilaterally and lower extremities.  Discussed patient with blood cultures which are pending with no growth.  MRSA screen was negative.  Respiratory viral panel with PCR and COVID-19 was negative.  Sputum culture is pending at time of documentation.  Patient continues without bandemia and WBCs are within normal limits.  No evidence of active infection, will monitor off antibiotics.  Patient's family was encouraged to treat fevers with antipyretics, and if he continues with high-grade fevers, to proceed to urgent care or emergency department for further work-up.  Patient does not have a PCP, and was given Dr. Hallman and Dr. Kumar's office numbers to inquire about finalized respiratory culture and blood cultures.  Additionally of note, patient had a urinalysis which was negative for infection, no reflex culture was indicated.  Patient expressed understanding, and felt that this plan of care was acceptable, and agrees with discharge at this time.    PROCEDURES PERFORMED  Procedure(s):  Left Heart Cath  Percutaneous Coronary  Intervention  Stent MONA coronary  -------------------       LABS/RADIOLOGICAL STUDIES  Lab Results (last 72 hours)       Procedure Component Value Units Date/Time    Respiratory Culture - Sputum, Cough [[id number]] Collected: 07/07/21 1243    Specimen: Sputum from Cough Updated: 07/07/21 1326     Gram Stain Many (4+) WBCs per low power field      Rare (1+) Epithelial cells per low power field      Moderate (3+) Mixed bacterial morphotypes seen on Gram Stain    POC Glucose Once [[id number]]  (Abnormal) Collected: 07/07/21 1123    Specimen: Blood Updated: 07/07/21 1124     Glucose 120 mg/dL      Comment: Serial Number: [id number]:  [id number]       POC Glucose Once [[id number]]  (Abnormal) Collected: 07/07/21 0723    Specimen: Blood Updated: 07/07/21 0724     Glucose 113 mg/dL      Comment: Serial Number: [id number]:  [id number]       Comprehensive Metabolic Panel [[id number]]  (Abnormal) Collected: 07/07/21 0606    Specimen: Blood Updated: 07/07/21 0649     Glucose 120 mg/dL      BUN 10 mg/dL      Creatinine 0.87 mg/dL      Sodium 134 mmol/L      Potassium 4.3 mmol/L      Chloride 98 mmol/L      CO2 28.0 mmol/L      Calcium 9.0 mg/dL      Total Protein 6.6 g/dL      Albumin 3.60 g/dL      ALT (SGPT) 28 U/L      AST (SGOT) 61 U/L      Alkaline Phosphatase 60 U/L      Total Bilirubin 0.5 mg/dL      eGFR Non African Amer 90 mL/min/1.73      Globulin 3.0 gm/dL      A/G Ratio 1.2 g/dL      BUN/Creatinine Ratio 11.5     Anion Gap 8.0 mmol/L     Narrative:      GFR Normal >60  Chronic Kidney Disease <60  Kidney Failure <15      Phosphorus [[id number]]  (Normal) Collected: 07/07/21 0606    Specimen: Blood Updated: 07/07/21 0649     Phosphorus 2.9 mg/dL     Magnesium [[id number]]  (Normal) Collected: 07/07/21 0606    Specimen: Blood Updated: 07/07/21 0649     Magnesium 2.1 mg/dL     CBC & Differential [[id number]]  (Abnormal) Collected: 07/07/21 0606    Specimen: Blood Updated: 07/07/21 0633    Narrative:      The  following orders were created for panel order CBC & Differential.  Procedure                               Abnormality         Status                     ---------                               -----------         ------                     CBC Auto Differential[[id number]]        Abnormal            Final result                 Please view results for these tests on the individual orders.    CBC Auto Differential [[id number]]  (Abnormal) Collected: 07/07/21 0606    Specimen: Blood Updated: 07/07/21 0633     WBC 9.00 10*3/mm3      RBC 3.89 10*6/mm3      Hemoglobin 12.2 g/dL      Hematocrit 36.3 %      MCV 93.4 fL      MCH 31.4 pg      MCHC 33.7 g/dL      RDW 13.7 %      RDW-SD 44.6 fl      MPV 8.0 fL      Platelets 182 10*3/mm3      Neutrophil % 73.4 %      Lymphocyte % 10.6 %      Monocyte % 15.3 %      Eosinophil % 0.2 %      Basophil % 0.5 %      Neutrophils, Absolute 6.60 10*3/mm3      Lymphocytes, Absolute 1.00 10*3/mm3      Monocytes, Absolute 1.40 10*3/mm3      Eosinophils, Absolute 0.00 10*3/mm3      Basophils, Absolute 0.00 10*3/mm3      nRBC 0.0 /100 WBC     Blood Culture - Blood, Arm, Right [[id number]] Collected: 07/05/21 2103    Specimen: Blood from Arm, Right Updated: 07/06/21 2115     Blood Culture No growth at 24 hours    Blood Culture - Blood, Hand, Right [[id number]] Collected: 07/05/21 2103    Specimen: Blood from Hand, Right Updated: 07/06/21 2115     Blood Culture No growth at 24 hours    POC Glucose Once [[id number]]  (Abnormal) Collected: 07/06/21 1954    Specimen: Blood Updated: 07/06/21 1955     Glucose 138 mg/dL      Comment: Serial Number: [id number]:  [id number]       POC Glucose Once [[id number]]  (Normal) Collected: 07/06/21 1605    Specimen: Blood Updated: 07/06/21 1606     Glucose 100 mg/dL      Comment: Serial Number: [id number]:  [id number]       Lactic Acid, Plasma [[id number]]  (Normal) Collected: 07/06/21 0600    Specimen: Blood Updated: 07/06/21 0726     Lactate 1.0 mmol/L      Comprehensive Metabolic Panel [[id number]]  (Abnormal) Collected: 07/06/21 0023    Specimen: Blood Updated: 07/06/21 0114     Glucose 116 mg/dL      BUN 11 mg/dL      Creatinine 0.87 mg/dL      Sodium 134 mmol/L      Potassium 3.9 mmol/L      Chloride 98 mmol/L      CO2 27.0 mmol/L      Calcium 8.7 mg/dL      Total Protein 6.2 g/dL      Albumin 3.70 g/dL      ALT (SGPT) 31 U/L      AST (SGOT) 116 U/L      Alkaline Phosphatase 60 U/L      Total Bilirubin 0.5 mg/dL      eGFR Non African Amer 90 mL/min/1.73      Globulin 2.5 gm/dL      A/G Ratio 1.5 g/dL      BUN/Creatinine Ratio 12.6     Anion Gap 9.0 mmol/L     Narrative:      GFR Normal >60  Chronic Kidney Disease <60  Kidney Failure <15      Phosphorus [[id number]]  (Abnormal) Collected: 07/06/21 0023    Specimen: Blood Updated: 07/06/21 0114     Phosphorus 2.4 mg/dL     Magnesium [[id number]]  (Normal) Collected: 07/06/21 0023    Specimen: Blood Updated: 07/06/21 0114     Magnesium 1.9 mg/dL     Lactic Acid, Plasma [[id number]]  (Normal) Collected: 07/06/21 0023    Specimen: Blood Updated: 07/06/21 0112     Lactate 0.8 mmol/L     CBC & Differential [[id number]]  (Abnormal) Collected: 07/06/21 0023    Specimen: Blood Updated: 07/06/21 0052    Narrative:      The following orders were created for panel order CBC & Differential.  Procedure                               Abnormality         Status                     ---------                               -----------         ------                     CBC Auto Differential[[id number]]        Abnormal            Final result                 Please view results for these tests on the individual orders.    CBC Auto Differential [[id number]]  (Abnormal) Collected: 07/06/21 0023    Specimen: Blood Updated: 07/06/21 0052     WBC 10.10 10*3/mm3      RBC 4.12 10*6/mm3      Hemoglobin 13.0 g/dL      Hematocrit 38.8 %      MCV 94.1 fL      MCH 31.5 pg      MCHC 33.4 g/dL      RDW 13.6 %      RDW-SD 44.2 fl      MPV 8.1 fL       "Platelets 187 10*3/mm3      Neutrophil % 70.4 %      Lymphocyte % 12.1 %      Monocyte % 16.9 %      Eosinophil % 0.1 %      Basophil % 0.5 %      Neutrophils, Absolute 7.10 10*3/mm3      Lymphocytes, Absolute 1.20 10*3/mm3      Monocytes, Absolute 1.70 10*3/mm3      Eosinophils, Absolute 0.00 10*3/mm3      Basophils, Absolute 0.00 10*3/mm3      nRBC 0.0 /100 WBC     Procalcitonin [[id number]]  (Normal) Collected: 07/05/21 2103    Specimen: Blood Updated: 07/05/21 2144     Procalcitonin 0.12 ng/mL     Narrative:      As a Marker for Sepsis (Non-Neonates):     1. <0.5 ng/mL represents a low risk of severe sepsis and/or septic shock.  2. >2 ng/mL represents a high risk of severe sepsis and/or septic shock.    As a Marker for Lower Respiratory Tract Infections that require antibiotic therapy:  PCT on Admission     Antibiotic Therapy             6-12 Hrs later  >0.5                          Strongly Recommended            >0.25 - <0.5             Recommended  0.1 - 0.25                  Discouraged                       Remeasure/reassess PCT  <0.1                         Strongly Discouraged         Remeasure/reassess PCT      As 28 day mortality risk marker: \"Change in Procalcitonin Result\" (>80% or <=80%) if Day 0 (or Day 1) and Day 4 values are available. Refer to http://www.[employer]-pct-calculator.com/    Change in PCT <=80 %   A decrease of PCT levels below or equal to 80% defines a positive change in PCT test result representing a higher risk for 28-day all-cause mortality of patients diagnosed with severe sepsis or septic shock.    Change in PCT >80 %   A decrease of PCT levels of more than 80% defines a negative change in PCT result representing a lower risk for 28-day all-cause mortality of patients diagnosed with severe sepsis or septic shock.              Results may be falsely decreased if patient taking Biotin.     Lactic Acid, Plasma [[id number]]  (Normal) Collected: 07/05/21 2102    Specimen: Blood Updated: " 07/05/21 2137     Lactate 1.0 mmol/L     Troponin [[id number]]  (Abnormal) Collected: 07/05/21 2103    Specimen: Blood Updated: 07/05/21 2137     Troponin T 3.200 ng/mL     Narrative:      Troponin T Reference Range:  <= 0.03 ng/mL-   Negative for AMI  >0.03 ng/mL-     Abnormal for myocardial necrosis.  Clinicians would have to utilize clinical acumen, EKG, Troponin and serial changes to determine if it is an Acute Myocardial Infarction or myocardial injury due to an underlying chronic condition.       Results may be falsely decreased if patient taking Biotin.      CBC & Differential [[id number]]  (Abnormal) Collected: 07/05/21 2103    Specimen: Blood Updated: 07/05/21 2114    Narrative:      The following orders were created for panel order CBC & Differential.  Procedure                               Abnormality         Status                     ---------                               -----------         ------                     CBC Auto Differential[[id number]]        Abnormal            Final result                 Please view results for these tests on the individual orders.    CBC Auto Differential [[id number]]  (Abnormal) Collected: 07/05/21 2103    Specimen: Blood Updated: 07/05/21 2114     WBC 9.90 10*3/mm3      RBC 4.19 10*6/mm3      Hemoglobin 13.4 g/dL      Hematocrit 39.2 %      MCV 93.5 fL      MCH 31.9 pg      MCHC 34.1 g/dL      RDW 13.6 %      RDW-SD 44.6 fl      MPV 7.7 fL      Platelets 195 10*3/mm3      Neutrophil % 75.2 %      Lymphocyte % 10.7 %      Monocyte % 13.5 %      Eosinophil % 0.2 %      Basophil % 0.4 %      Neutrophils, Absolute 7.50 10*3/mm3      Lymphocytes, Absolute 1.10 10*3/mm3      Monocytes, Absolute 1.30 10*3/mm3      Eosinophils, Absolute 0.00 10*3/mm3      Basophils, Absolute 0.00 10*3/mm3      nRBC 0.0 /100 WBC     Urinalysis With Microscopic If Indicated (No Culture) - Urine, Clean Catch [[id number]]  (Normal) Collected: 07/05/21 1952    Specimen: Urine, Clean Catch Updated:  07/05/21 2015     Color, UA Yellow     Appearance, UA Clear     pH, UA 6.5     Specific Gravity, UA 1.014     Glucose, UA Negative     Ketones, UA Negative     Bilirubin, UA Negative     Blood, UA Negative     Protein, UA Negative     Leuk Esterase, UA Negative     Nitrite, UA Negative     Urobilinogen, UA 0.2 E.U./dL    Narrative:      Urine microscopic not indicated.    Troponin [[id number]]  (Abnormal) Collected: 07/05/21 1715    Specimen: Blood Updated: 07/05/21 1738     Troponin T 3.770 ng/mL     Narrative:      Troponin T Reference Range:  <= 0.03 ng/mL-   Negative for AMI  >0.03 ng/mL-     Abnormal for myocardial necrosis.  Clinicians would have to utilize clinical acumen, EKG, Troponin and serial changes to determine if it is an Acute Myocardial Infarction or myocardial injury due to an underlying chronic condition.       Results may be falsely decreased if patient taking Biotin.      POC Glucose Once [[id number]]  (Abnormal) Collected: 07/05/21 1150    Specimen: Blood Updated: 07/05/21 1200     Glucose 143 mg/dL      Comment: Serial Number: [id number]:  [id number]       POC Glucose Once [[id number]]  (Abnormal) Collected: 07/05/21 0748    Specimen: Blood Updated: 07/05/21 0750     Glucose 111 mg/dL      Comment: Serial Number: [id number]:  [id number]       Phosphorus [[id number]]  (Normal) Collected: 07/05/21 0355    Specimen: Blood Updated: 07/05/21 0430     Phosphorus 2.7 mg/dL     Comprehensive Metabolic Panel [[id number]]  (Abnormal) Collected: 07/05/21 0355    Specimen: Blood Updated: 07/05/21 0430     Glucose 111 mg/dL      BUN 12 mg/dL      Creatinine 0.87 mg/dL      Sodium 134 mmol/L      Potassium 4.0 mmol/L      Chloride 98 mmol/L      CO2 29.0 mmol/L      Calcium 9.1 mg/dL      Total Protein 7.0 g/dL      Albumin 4.20 g/dL      ALT (SGPT) 42 U/L      AST (SGOT) 219 U/L      Alkaline Phosphatase 72 U/L      Total Bilirubin 0.4 mg/dL      eGFR Non African Amer 90 mL/min/1.73      Globulin  2.8 gm/dL      A/G Ratio 1.5 g/dL      BUN/Creatinine Ratio 13.8     Anion Gap 7.0 mmol/L     Narrative:      GFR Normal >60  Chronic Kidney Disease <60  Kidney Failure <15      Magnesium [[id number]]  (Normal) Collected: 07/05/21 0355    Specimen: Blood Updated: 07/05/21 0430     Magnesium 2.1 mg/dL     CBC & Differential [[id number]]  (Abnormal) Collected: 07/05/21 0355    Specimen: Blood Updated: 07/05/21 0407    Narrative:      The following orders were created for panel order CBC & Differential.  Procedure                               Abnormality         Status                     ---------                               -----------         ------                     CBC Auto Differential[[id number]]        Abnormal            Final result                 Please view results for these tests on the individual orders.    CBC Auto Differential [[id number]]  (Abnormal) Collected: 07/05/21 0355    Specimen: Blood Updated: 07/05/21 0407     WBC 9.80 10*3/mm3      RBC 4.29 10*6/mm3      Hemoglobin 13.8 g/dL      Hematocrit 40.0 %      MCV 93.2 fL      MCH 32.0 pg      MCHC 34.4 g/dL      RDW 13.9 %      RDW-SD 45.9 fl      MPV 7.3 fL      Platelets 210 10*3/mm3      Neutrophil % 73.4 %      Lymphocyte % 15.2 %      Monocyte % 10.2 %      Eosinophil % 0.6 %      Basophil % 0.6 %      Neutrophils, Absolute 7.20 10*3/mm3      Lymphocytes, Absolute 1.50 10*3/mm3      Monocytes, Absolute 1.00 10*3/mm3      Eosinophils, Absolute 0.10 10*3/mm3      Basophils, Absolute 0.10 10*3/mm3      nRBC 0.0 /100 WBC     POC Glucose Once [[id number]]  (Abnormal) Collected: 07/04/21 2018    Specimen: Blood Updated: 07/04/21 2019     Glucose 127 mg/dL      Comment: Serial Number: [id number]:  [id number]       Troponin [[id number]]  (Abnormal) Collected: 07/04/21 1742    Specimen: Blood Updated: 07/04/21 1828     Troponin T 1.600 ng/mL     Narrative:      Troponin T Reference Range:  <= 0.03 ng/mL-   Negative for AMI  >0.03 ng/mL-      "Abnormal for myocardial necrosis.  Clinicians would have to utilize clinical acumen, EKG, Troponin and serial changes to determine if it is an Acute Myocardial Infarction or myocardial injury due to an underlying chronic condition.       Results may be falsely decreased if patient taking Biotin.      POC Glucose Once [[id number]]  (Abnormal) Collected: 07/04/21 1749    Specimen: Blood Updated: 07/04/21 1750     Glucose 125 mg/dL      Comment: Serial Number: [id number]:  [id number]               XR Chest 1 View    Result Date: 7/5/2021  Impression: No acute cardiopulmonary process seen. Electronically signed by:  Eligio Aquino M.D.  7/5/2021 8:19 PM    XR Chest 1 View    Result Date: 7/4/2021  1. Minor left basal opacity is likely atelectasis. 2. 3 mm right upper lung opacity is favored to be a granuloma in this patient with no reported history of smoking.  Electronically Signed By-America Mccarthy MD On:7/4/2021 6:00 PM This report was finalized on [id number] by  America Mccarthy MD.      CONSULTS   Consults       No orders found from 6/5/2021 to 7/5/2021.            CONDITION ON DISCHARGE    Stable    VITAL SIGNS  /71   Pulse 68   Temp 99.5 °F (37.5 °C) (Oral)   Resp 18   Ht 170.2 cm (67\")   Wt 86.9 kg (191 lb 9.3 oz)   SpO2 97%   BMI 30.01 kg/m²     PHYSICAL EXAM  General Appearance:  Alert, cooperative, no distress, appears stated age  Head:  Normocephalic, without obvious abnormality, atraumatic  Eyes:  PERRL, conjunctiva/corneas clear, EOM's intact     Neck:  Supple,  no JVD, trachea midline  Lungs: Clear and equal throughout, respirations unlabored without accessory muscle use  Chest wall: Symmetrical chest wall movement  Heart:  Regular rate and rhythm, S1 and S2 normal, no murmur, rub or gallop  Abdomen:  Soft, non-tender, bowel sounds active all four quadrants, no rebound or guarding  Extremities:  Extremities normal, no cyanosis or edema.  Sheath site, with sheath now removed, site is " soft and without hematoma, dressing intact.  Skin:  No rashes or lesions  Neurologic:   Alert and oriented, no focal deficits      DISCHARGE DISPOSITION  Home or Self Care    DISCHARGE MEDICATIONS     Discharge Medications        New Medications        Instructions Start Date   amiodarone 200 MG tablet  Commonly known as: PACERONE   200 mg, Oral, Every 12 Hours Scheduled      aspirin 81 MG EC tablet   81 mg, Oral, Daily   Start Date: July 8, 2021     atorvastatin 20 MG tablet  Commonly known as: LIPITOR   20 mg, Oral, Nightly      clopidogrel 75 MG tablet  Commonly known as: PLAVIX   75 mg, Oral, Daily   Start Date: July 8, 2021     lisinopril 2.5 MG tablet  Commonly known as: PRINIVIL,ZESTRIL   2.5 mg, Oral, Every 24 Hours Scheduled   Start Date: July 8, 2021     metoprolol tartrate 25 MG tablet  Commonly known as: LOPRESSOR   12.5 mg, Oral, Every 12 Hours Scheduled             Continue These Medications        Instructions Start Date   clonazePAM 0.5 MG tablet  Commonly known as: KlonoPIN   0.5 mg, Oral, 2 Times Daily PRN      escitalopram 20 MG tablet  Commonly known as: LEXAPRO   20 mg, Oral, Daily                 DISCHARGE DIET   Diet Instructions       Diet: Cardiac      Discharge Diet: Cardiac            ACTIVITY AT DISCHARGE   Activity Instructions       Gradually Increase Activity Until at Pre-Hospitalization Level      Lifting Restrictions      Type of Restriction: Lifting    Lifting Restrictions: Lifting Restriction (Indicate Limit)    Weight Limit (Pounds): 10    Length of Lifting Restriction: 7 days              FOLLOW-UP APPOINTMENTS  Follow up with Dr. Lai in 2-4 weeks, office number [phone].         TEST RESULTS PENDING AT DISCHARGE  Pending Labs       Order Current Status    Blood Culture - Blood, Arm, Right Preliminary result    Blood Culture - Blood, Hand, Right Preliminary result    Respiratory Culture - Sputum, Cough Preliminary result              This note scribed by me for Dr. Hallman.      Time: Discharge 37 min    Electronically signed by FRANCIA Gutierrez, 07/07/21, 2:26 PM EDT.    I personally have examined  and interviewed the patient. I have reviewed the history, data, problems, assessment and plan with our NP.  Critical care time in direct medical management (   ) minutes  Electronically signed by Vanessa Hallman MD, D,ABSM, 07/07/21, 7:13 PM EDT.

## 2021-07-07 NOTE — NURSING NOTE
Pt discharged. Home with sister. IVs removed, written education material provided, and signed patient up with [facility].

## 2021-07-07 NOTE — DISCHARGE INSTRUCTIONS
If you experience continued fever, persistent chills, signs or symptoms of infection at groin site, please call cardiologist or present to urgent care, primary care office if able, or emergency department.      Encouraged to sing up for My Chart so that you can have access to your medical record, including test results.    If you are experiencing increased cough, you may contact [facility] outpatient pulmonary for test results or reach Dr. Hallman at [phone] or Dr. Kumar at [phone].

## 2021-07-07 NOTE — PROGRESS NOTES
PULMONARY/CRITICAL CARE PROGRESS NOTE       NAME:     Brayden Rainey   AGE:     60 y.o.   SEX:  male   : 1960       MRN:       [id number]          RM:  BLAINE CVCU      ASSESSMENT & PLAN     F/U: Critical care management, acute lateral MI    Principal Problem:    Acute lateral myocardial infarction (CMS/HCC)  Active Problems:    Anxiety    PTSD (post-traumatic stress disorder)     Multidisciplinary Rounds:  -Remains on amiodarone gtt, plan to transition to oral today  -Fever of 102 °F overnight on , no form of sepsis noted, likely related to AMI, monitor off antibiotics; again with fever of 101F overnight.  Checking Venous Duplex; still with no S/Sx of Sepsis.  -Reported constipation, but had a bowel movement this AM.    -Possible discharge today if ok with Ming, if not with downgrade.  -Miralax for constipation ordered.    Assessment and plan:  Acute lateral myocardial infarction  -S/p cardiac catheterization with Dr. Lai  -Monitored in ICU  -Reported bradycardia and cardiac Cath Lab-resolved  -Off dopamine gtt  -Morphine and fentanyl for pain, per cardiology  -Started on aspirin and Plavix  -Started on metoprolol     Atrial fibrillation with RVR, now converted to NSR  -Amiodarone bolus and drip  -Normal sinus rhythm  -Cardiology managing    Fever  -T Max 102F  -Procalcitonin 0.12, lactate 0.8, WBCs 10.1 and without bandemia  -UA-negative  -RVP/Covid-negative  -Fever was likely due to acute MI, continue to closely monitor  -Monitor off antibiotics  -Chest x-ray was negative for acute cardiopulmonary abnormalities.    Constipation  -Miralax ordered.  -PRNs ordered yesterday.     Anxiety  PTSD  -Continue home Lexapro  -Continue home dose as needed Klonopin     Code Status: Full  VTE Prophylaxis: SCDs  PUD Prophylaxis: Pepcid    [place] & SUBJECTIVE     Brayden Rainey is a 60 y.o. male  has a past medical history of Anxiety (2021) and PTSD (post-traumatic stress disorder) (2021).   Patient  presented to [facility] on 7/4/2021 with complaint of chest pain.  Patient stated that he was at [facility] earlier this evening, and had sudden onset of chest pain that radiated down his left arm and up his neck and jaw.  Patient was diaphoretic at the time.  Patient described his pain as crushing, with no precipitating or alleviating factors.  EMS was called, and EKG obtained showed acute STEMI.  Patient was brought to [facility] ER and Cath Lab was activated.  Patient was found to have an acute anterior lateral myocardial infarction.  Cardiac cath by Dr. Lai, PCI to LAD with drug-eluting stent completed, stenosis was decreased from 80 to 90% to less than 10%.  Patient had 90-95% occlusion of LAD and 70-80% occlusion of RCA.  Per Dr. Lai there were small diagonal branches at the origin of lesion of the LAD, that were not able to open.  EF was found to be 45%.  Patient became bradycardic during cardiac catheterization and given atropine and started on dopamine.     Patient was admitted to ICU after cardiac cath, arterial sheath was still in place at the time.  Patient denies shortness of air, palpitations, dizziness or syncope, headache, chills, or fever.  Denies abdominal pain, nausea, vomiting, diarrhea, constipation, loss of weight or loss of appetite, dysuria, blood in urine or stool.     CBC was obtained in ED which was unremarkable.    7/5: No acute events overnight.  No further bradycardia.  Patient denies chest pain or shortness of air.  7/6: No acute events overnight, on amiodarone drip, denies chest pain, shortness of breath.  Did spike a fever of 102 overnight, without signs of sepsis.  7/7: No acute events overnight.  T. Max 101.3 overnight.  Constipation resolved, denies shortness of breath, chest pain, nausea, vomiting, abdominal pain.      REVIEW OF SYSTEMS:  Pertinent items are noted in HPI, all other systems reviewed and negative      MEDICATIONS     SCHEDULED MEDICATIONS:  "  amiodarone, 200 mg, Oral, Q12H  aspirin, 81 mg, Oral, Daily  atorvastatin, 20 mg, Oral, Nightly  clopidogrel, 75 mg, Oral, Daily  docusate sodium, 100 mg, Oral, Daily  escitalopram, 20 mg, Oral, Daily  lisinopril, 2.5 mg, Oral, Q24H  metoprolol tartrate, 12.5 mg, Oral, Q12H  polyethylene glycol, 17 g, Oral, Once  sodium chloride, 10 mL, Intravenous, Q12H        CONTINUOUS INFUSIONS:        PRN MEDS:    acetaminophen **OR** acetaminophen    acetaminophen    aluminum-magnesium hydroxide-simethicone    atropine    bisacodyl    clonazePAM    fentaNYL citrate (PF)    morphine **OR** Morphine    ondansetron **OR** ondansetron    sodium chloride    sodium chloride    sodium chloride    OBJECTIVE     VITAL SIGNS:  /71   Pulse 68   Temp 97.7 °F (36.5 °C) (Oral)   Resp 18   Ht 170.2 cm (67\")   Wt 86.9 kg (191 lb 9.3 oz)   SpO2 97%   BMI 30.01 kg/m²     I/O FROM PREVIOUS 3 SHIFTS:  I/O last 3 completed shifts:  In: 1772 [P.O.:1460; I.V.:312]  Out: 2575 [Urine:2575]    I/O THIS SHIFT:  I/O this shift:  In: 400 [P.O.:400]  Out: -     PHYSICAL EXAM:  General Appearance:  Alert, cooperative, no distress, appears stated age  Head:  Normocephalic, without obvious abnormality, atraumatic  Eyes:  PERRL, conjunctiva/corneas clear, EOM's intact     Neck:  Supple,  no JVD, trachea midline  Lungs: Clear and equal throughout, respirations unlabored without accessory muscle use  Chest wall: Symmetrical chest wall movement  Heart:  Regular rate and rhythm, S1 and S2 normal, no murmur, rub or gallop  Abdomen:  Soft, non-tender, bowel sounds active all four quadrants, no rebound or guarding  Extremities:  Extremities normal, no cyanosis or edema.  Sheath site, with sheath now removed, site is soft and without hematoma, dressing intact.  Skin:  No rashes or lesions  Neurologic:   Alert and oriented, no focal deficits      RESULTS     LABS:  Lab Results (last 24 hours)       Procedure Component Value Units Date/Time    POC Glucose " Once [[id number]]  (Abnormal) Collected: 07/07/21 0723    Specimen: Blood Updated: 07/07/21 0724     Glucose 113 mg/dL      Comment: Serial Number: [id number]:  [id number]       Comprehensive Metabolic Panel [[id number]]  (Abnormal) Collected: 07/07/21 0606    Specimen: Blood Updated: 07/07/21 0649     Glucose 120 mg/dL      BUN 10 mg/dL      Creatinine 0.87 mg/dL      Sodium 134 mmol/L      Potassium 4.3 mmol/L      Chloride 98 mmol/L      CO2 28.0 mmol/L      Calcium 9.0 mg/dL      Total Protein 6.6 g/dL      Albumin 3.60 g/dL      ALT (SGPT) 28 U/L      AST (SGOT) 61 U/L      Alkaline Phosphatase 60 U/L      Total Bilirubin 0.5 mg/dL      eGFR Non African Amer 90 mL/min/1.73      Globulin 3.0 gm/dL      A/G Ratio 1.2 g/dL      BUN/Creatinine Ratio 11.5     Anion Gap 8.0 mmol/L     Narrative:      GFR Normal >60  Chronic Kidney Disease <60  Kidney Failure <15      Phosphorus [[id number]]  (Normal) Collected: 07/07/21 0606    Specimen: Blood Updated: 07/07/21 0649     Phosphorus 2.9 mg/dL     Magnesium [[id number]]  (Normal) Collected: 07/07/21 0606    Specimen: Blood Updated: 07/07/21 0649     Magnesium 2.1 mg/dL     CBC & Differential [[id number]]  (Abnormal) Collected: 07/07/21 0606    Specimen: Blood Updated: 07/07/21 0633    Narrative:      The following orders were created for panel order CBC & Differential.  Procedure                               Abnormality         Status                     ---------                               -----------         ------                     CBC Auto Differential[[id number]]        Abnormal            Final result                 Please view results for these tests on the individual orders.    CBC Auto Differential [[id number]]  (Abnormal) Collected: 07/07/21 0606    Specimen: Blood Updated: 07/07/21 0633     WBC 9.00 10*3/mm3      RBC 3.89 10*6/mm3      Hemoglobin 12.2 g/dL      Hematocrit 36.3 %      MCV 93.4 fL      MCH 31.4 pg      MCHC 33.7 g/dL      RDW 13.7 %       RDW-SD 44.6 fl      MPV 8.0 fL      Platelets 182 10*3/mm3      Neutrophil % 73.4 %      Lymphocyte % 10.6 %      Monocyte % 15.3 %      Eosinophil % 0.2 %      Basophil % 0.5 %      Neutrophils, Absolute 6.60 10*3/mm3      Lymphocytes, Absolute 1.00 10*3/mm3      Monocytes, Absolute 1.40 10*3/mm3      Eosinophils, Absolute 0.00 10*3/mm3      Basophils, Absolute 0.00 10*3/mm3      nRBC 0.0 /100 WBC     Blood Culture - Blood, Arm, Right [[id number]] Collected: 07/05/21 2103    Specimen: Blood from Arm, Right Updated: 07/06/21 2115     Blood Culture No growth at 24 hours    Blood Culture - Blood, Hand, Right [[id number]] Collected: 07/05/21 2103    Specimen: Blood from Hand, Right Updated: 07/06/21 2115     Blood Culture No growth at 24 hours    POC Glucose Once [[id number]]  (Abnormal) Collected: 07/06/21 1954    Specimen: Blood Updated: 07/06/21 1955     Glucose 138 mg/dL      Comment: Serial Number: [id number]:  [id number]       POC Glucose Once [[id number]]  (Normal) Collected: 07/06/21 1605    Specimen: Blood Updated: 07/06/21 1606     Glucose 100 mg/dL      Comment: Serial Number: [id number]:  [id number]                RADIOLOGY:  No Radiology Exams Resulted Within Past 24 Hours    ECHOCARDIOGRAM:       Echo completed with result pending.    I reviewed the patient's new clinical results.    This note has been scribed by me for pulmonary attending physician.    Electronically signed by FRANCIA Gutierrez, 07/07/21 at 09:48 EDT.     I personally have examined  and interviewed the patient. I have reviewed the history, data, problems, assessment and plan with our NP.  Critical care time in direct medical management (   ) minutes  Electronically signed by Vanessa Hallman MD, D,ABS, 07/07/21, 7:13 PM EDT.

## 2021-07-08 ENCOUNTER — READMISSION MANAGEMENT (OUTPATIENT)
Dept: CALL CENTER | Facility: HOSPITAL | Age: 61
End: 2021-07-08

## 2021-07-08 NOTE — CASE MANAGEMENT/SOCIAL WORK
Case Management Discharge Note                Selected Continued Care - Discharged on 7/7/2021 Admission date: 7/4/2021 - Discharge disposition: Home or Self Care                     Final Discharge Disposition Code: 01 - home or self-care

## 2021-07-08 NOTE — OUTREACH NOTE
Prep Survey      Responses   [facility]   Is LACE score < 7 ?  Yes   Emergency Room discharge w/ pulse ox?  No   Eligibility  Readm Mgmt   Discharge diagnosis  Acute lateral myocardial infarction   Does the patient have one of the following disease processes/diagnoses(primary or secondary)?  Acute MI (STEMI,NSTEMI)   Does the patient have Home health ordered?  No   Is there a DME ordered?  No   Prep survey completed?  Yes          Jaylin Gerber RN

## 2021-07-09 LAB
BACTERIA SPEC RESP CULT: NORMAL
GRAM STN SPEC: NORMAL

## 2021-07-10 LAB
BACTERIA SPEC AEROBE CULT: NORMAL
BACTERIA SPEC AEROBE CULT: NORMAL
BH CV ECHO MEAS - ACS: 2 CM
BH CV ECHO MEAS - AO MAX PG (FULL): 0.79 MMHG
BH CV ECHO MEAS - AO MAX PG: 6.2 MMHG
BH CV ECHO MEAS - AO MEAN PG (FULL): 1 MMHG
BH CV ECHO MEAS - AO MEAN PG: 4 MMHG
BH CV ECHO MEAS - AO ROOT AREA (BSA CORRECTED): 1.9
BH CV ECHO MEAS - AO ROOT AREA: 11.5 CM^2
BH CV ECHO MEAS - AO ROOT DIAM: 3.8 CM
BH CV ECHO MEAS - AO V2 MAX: 124.4 CM/SEC
BH CV ECHO MEAS - AO V2 MEAN: 96.8 CM/SEC
BH CV ECHO MEAS - AO V2 VTI: 31.7 CM
BH CV ECHO MEAS - AORTIC HR: 50.2 BPM
BH CV ECHO MEAS - AORTIC R-R: 1.2 SEC
BH CV ECHO MEAS - AVA(I,A): 3 CM^2
BH CV ECHO MEAS - AVA(I,D): 3 CM^2
BH CV ECHO MEAS - AVA(V,A): 3.1 CM^2
BH CV ECHO MEAS - AVA(V,D): 3.1 CM^2
BH CV ECHO MEAS - BSA(HAYCOCK): 2 M^2
BH CV ECHO MEAS - BSA: 2 M^2
BH CV ECHO MEAS - BZI_BMI: 29.6 KILOGRAMS/M^2
BH CV ECHO MEAS - BZI_METRIC_HEIGHT: 170.2 CM
BH CV ECHO MEAS - BZI_METRIC_WEIGHT: 85.7 KG
BH CV ECHO MEAS - CI(AO): 9.2 L/MIN/M^2
BH CV ECHO MEAS - CI(LVOT): 2.4 L/MIN/M^2
BH CV ECHO MEAS - CO(AO): 18.2 L/MIN
BH CV ECHO MEAS - CO(LVOT): 4.8 L/MIN
BH CV ECHO MEAS - EDV(CUBED): 87.2 ML
BH CV ECHO MEAS - EDV(MOD-SP4): 91 ML
BH CV ECHO MEAS - EDV(TEICH): 89.3 ML
BH CV ECHO MEAS - EF(CUBED): 72.4 %
BH CV ECHO MEAS - EF(MOD-BP): 56 %
BH CV ECHO MEAS - EF(MOD-SP4): 56.1 %
BH CV ECHO MEAS - EF(TEICH): 64.3 %
BH CV ECHO MEAS - ESV(CUBED): 24.1 ML
BH CV ECHO MEAS - ESV(MOD-SP4): 40 ML
BH CV ECHO MEAS - ESV(TEICH): 31.9 ML
BH CV ECHO MEAS - FS: 34.9 %
BH CV ECHO MEAS - IVS/LVPW: 0.8
BH CV ECHO MEAS - IVSD: 0.7 CM
BH CV ECHO MEAS - LA DIMENSION(2D): 2.8 CM
BH CV ECHO MEAS - LV DIASTOLIC VOL/BSA (35-75): 46.1 ML/M^2
BH CV ECHO MEAS - LV MASS(C)D: 108.2 GRAMS
BH CV ECHO MEAS - LV MASS(C)DI: 54.8 GRAMS/M^2
BH CV ECHO MEAS - LV MAX PG: 5.4 MMHG
BH CV ECHO MEAS - LV MEAN PG: 3 MMHG
BH CV ECHO MEAS - LV SYSTOLIC VOL/BSA (12-30): 20.3 ML/M^2
BH CV ECHO MEAS - LV V1 MAX: 116.2 CM/SEC
BH CV ECHO MEAS - LV V1 MEAN: 81.8 CM/SEC
BH CV ECHO MEAS - LV V1 VTI: 28.3 CM
BH CV ECHO MEAS - LVIDD: 4.4 CM
BH CV ECHO MEAS - LVIDS: 2.9 CM
BH CV ECHO MEAS - LVOT AREA: 3.4 CM^2
BH CV ECHO MEAS - LVOT DIAM: 2.1 CM
BH CV ECHO MEAS - LVPWD: 0.87 CM
BH CV ECHO MEAS - MV A MAX VEL: 60.7 CM/SEC
BH CV ECHO MEAS - MV DEC SLOPE: 416.3 CM/SEC^2
BH CV ECHO MEAS - MV DEC TIME: 0.21 SEC
BH CV ECHO MEAS - MV E MAX VEL: 89.4 CM/SEC
BH CV ECHO MEAS - MV E/A: 1.5
BH CV ECHO MEAS - MV MAX PG: 3.2 MMHG
BH CV ECHO MEAS - MV MEAN PG: 0.99 MMHG
BH CV ECHO MEAS - MV V2 MAX: 89.3 CM/SEC
BH CV ECHO MEAS - MV V2 MEAN: 44.4 CM/SEC
BH CV ECHO MEAS - MV V2 VTI: 30.3 CM
BH CV ECHO MEAS - MVA(VTI): 3.1 CM^2
BH CV ECHO MEAS - PA MAX PG (FULL): 4.2 MMHG
BH CV ECHO MEAS - PA MAX PG: 5.5 MMHG
BH CV ECHO MEAS - PA V2 MAX: 117.2 CM/SEC
BH CV ECHO MEAS - PULM A REVS DUR: 0.08 SEC
BH CV ECHO MEAS - PULM A REVS VEL: 29.2 CM/SEC
BH CV ECHO MEAS - PULM DIAS VEL: 37.3 CM/SEC
BH CV ECHO MEAS - PULM S/D: 1.4
BH CV ECHO MEAS - PULM SYS VEL: 51.2 CM/SEC
BH CV ECHO MEAS - RAP SYSTOLE: 3 MMHG
BH CV ECHO MEAS - RV MAX PG: 1.3 MMHG
BH CV ECHO MEAS - RV MEAN PG: 0.66 MMHG
BH CV ECHO MEAS - RV V1 MAX: 56.4 CM/SEC
BH CV ECHO MEAS - RV V1 MEAN: 38.7 CM/SEC
BH CV ECHO MEAS - RV V1 VTI: 12.1 CM
BH CV ECHO MEAS - RVDD: 2.3 CM
BH CV ECHO MEAS - RVSP: 15.9 MMHG
BH CV ECHO MEAS - SI(AO): 183.9 ML/M^2
BH CV ECHO MEAS - SI(CUBED): 32 ML/M^2
BH CV ECHO MEAS - SI(LVOT): 48.2 ML/M^2
BH CV ECHO MEAS - SI(MOD-SP4): 25.9 ML/M^2
BH CV ECHO MEAS - SI(TEICH): 29.1 ML/M^2
BH CV ECHO MEAS - SV(AO): 363 ML
BH CV ECHO MEAS - SV(CUBED): 63.1 ML
BH CV ECHO MEAS - SV(LVOT): 95.2 ML
BH CV ECHO MEAS - SV(MOD-SP4): 51 ML
BH CV ECHO MEAS - SV(TEICH): 57.5 ML
BH CV ECHO MEAS - TR MAX VEL: 179.3 CM/SEC

## 2021-07-12 ENCOUNTER — READMISSION MANAGEMENT (OUTPATIENT)
Dept: CALL CENTER | Facility: HOSPITAL | Age: 61
End: 2021-07-12

## 2021-07-12 PROBLEM — I48.91 NEW ONSET A-FIB: Status: ACTIVE | Noted: 2021-07-12

## 2021-07-12 PROBLEM — E78.2 MIXED HYPERLIPIDEMIA: Status: ACTIVE | Noted: 2021-07-07

## 2021-07-12 NOTE — OUTREACH NOTE
AMI Week 1 Survey      Responses   [facility] patient discharged from?  [facility]   Does the patient have one of the following disease processes/diagnoses(primary or secondary)?  Acute MI (STEMI,NSTEMI)   Week 1 attempt successful?  Yes   Call start time  1210   Call end time  1225   Discharge diagnosis  Acute lateral myocardial infarction   Is patient permission given to speak with other caregiver?  Yes   List who call center can speak with  sister   Medication alerts for this patient  Pt was mistaken on meds, he is not taking clonazepam he was confused and has Clonidine but is not taking until speaking with cardiology    Meds reviewed with patient/caregiver?  Yes   Is the patient having any side effects they believe may be caused by any medication additions or changes?  No   Does the patient have all prescriptions related to this admission filled (includes statins,anticoagulants,HTN meds,anti-arrhythmia meds)  Yes   Is the patient taking all medications as directed (includes completed medication regime)?  Yes   Does the patient have a primary care provider?   No [Not interested in help finding PCP. He has no health insurance and trying to get coverage or job with benefits. Pt is unemployed.]   Does the patient have an appointment with their PCP,cardiologist,or clinic within 7 days of discharge?  Yes   Comments regarding PCP  Cardiology appt 7/14   Has the patient kept scheduled appointments due by today?  N/A   Psychosocial issues?  No   Comments  pt c/o productive cough, green phlegm, denies fever currently but did have fever first two days at home. Pt reports had some chestpain yesterday that radiated to arm/shoulder, no more since then. Walking daily. LHC site- right groin, bruising present, denies pain.    Did the patient receive a copy of their discharge instructions?  Yes   Nursing interventions  Reviewed instructions with patient   What is the patient's perception of their health status since discharge?   Improving   Nursing interventions  Nurse provided patient education   Is the patient/caregiver able to teach back signs and symptoms of when to call for help immediately:  Sudden chest discomfort, Sudden discomfort in arms, back, neck or jaw, Irregular or rapid heart rate, Dizziness or lightheadedness   Is the patient/caregiver able to teach back lifestyle changes to help prevent MIs  Regular exercise as approved by provider   Is the patient/caregiver able to teach back ways to prevent a second heart attack:  Take medications   If the patient is a current smoker, are they able to teach back resources for cessation?  Not a smoker   Is the patient/caregiver able to teach back the hierarchy of who to call/visit for symptoms/problems? PCP, Specialist, Home health nurse, Urgent Care, ED, 911  Yes   Week 1 call completed?  Yes          Monica Yanez RN

## 2021-07-13 NOTE — PROGRESS NOTES
Date of Office Visit: 2021  Encounter Provider: Dr. Jericho Lai  Place of Service: [facility] CARDIOLOGY [place]  Patient Name: Brayden Rainey  :1960  Provider, No Known    Chief Complaint   Patient presents with   • Atrial Fibrillation     post heart cath/echo   • Coronary Artery Disease   • Hyperlipidemia     History of Present Illness:    I am pleased to see Mr. Rainey in my office today as a follow-up.    As you know, patient is 60-year-old white male whose past medical history is significant for hypertension, hyperlipidemia, CAD, coronary artery stenting, atrial fibrillation, who came today for follow-up after his recent hospital admission and discharge.    In 2021, patient was admitted with acute anterior myocardial infarction.  Patient underwent urgent cardiac catheterization and was found to have 99% stenosis of proximal LAD.  Patient underwent successful stenting.  LVEF was 50 to 55% with akinetic distal anterior wall and apex.  RCA was noted to have 80% stenosis.  Patient was started on aspirin and Plavix.  Post myocardial infarction, patient developed atrial fibrillation, he required amiodarone.  Sinus rhythm was achieved.    Since the discharge, patient is overall doing well.  Patient denies any symptom of chest pain or tightness or heaviness.  No orthopnea PND no syncope or presyncope.  No leg edema noted.    Overall patient is doing well.  Groin has healed well.  He is feeling better.  I would recommend to proceed with PCI/stenting of RCA.  Risk and benefit are explained.  We will proceed with PCI to RCA on August 3, 2021.            Past Medical History:   Diagnosis Date   • Anxiety 2021   • Atrial fibrillation (CMS/HCC)    • Coronary artery disease    • Hyperlipidemia    • PTSD (post-traumatic stress disorder) 2021         Past Surgical History:   Procedure Laterality Date   • CARDIAC CATHETERIZATION N/A 2021    Procedure: Left Heart Cath;  Surgeon: Jericho Lai,  MD;  Location: [facility] CATH INVASIVE LOCATION;  Service: Cardiology;  Laterality: N/A;   • CARDIAC CATHETERIZATION N/A 7/4/2021    Procedure: Percutaneous Coronary Intervention;  Surgeon: Jericho Lai MD;  Location: [facility] CATH INVASIVE LOCATION;  Service: Cardiology;  Laterality: N/A;   • CARDIAC CATHETERIZATION N/A 7/4/2021    Procedure: Stent MONA coronary;  Surgeon: Jericho Lai MD;  Location: [facility] CATH INVASIVE LOCATION;  Service: Cardiology;  Laterality: N/A;           Current Outpatient Medications:   •  amiodarone (PACERONE) 200 MG tablet, Take 1 tablet by mouth Every 12 (Twelve) Hours., Disp: 60 tablet, Rfl: 3  •  aspirin 81 MG EC tablet, Take 1 tablet by mouth Daily., Disp: 30 tablet, Rfl: 3  •  atorvastatin (LIPITOR) 20 MG tablet, Take 1 tablet by mouth Every Night., Disp: 30 tablet, Rfl: 3  •  clopidogrel (PLAVIX) 75 MG tablet, Take 1 tablet by mouth Daily., Disp: 30 tablet, Rfl: 5  •  escitalopram (LEXAPRO) 20 MG tablet, Take 20 mg by mouth Daily., Disp: , Rfl:   •  lisinopril (PRINIVIL,ZESTRIL) 2.5 MG tablet, Take 1 tablet by mouth Daily., Disp: 30 tablet, Rfl: 3  •  metoprolol tartrate (LOPRESSOR) 25 MG tablet, Take 0.5 tablets by mouth Every 12 (Twelve) Hours., Disp: 30 tablet, Rfl: 3      Social History     Socioeconomic History   • Marital status: Single     Spouse name: Not on file   • Number of children: Not on file   • Years of education: Not on file   • Highest education level: Not on file   Tobacco Use   • Smoking status: Former Smoker   • Smokeless tobacco: Never Used   Vaping Use   • Vaping Use: Never used   Substance and Sexual Activity   • Alcohol use: Yes   • Drug use: Never   • Sexual activity: Defer         Review of Systems   Constitutional: Negative for chills and fever.   HENT: Negative for ear discharge and nosebleeds.    Eyes: Negative for discharge and redness.   Cardiovascular: Negative for chest pain, orthopnea, palpitations, paroxysmal nocturnal dyspnea and syncope.  "  Respiratory: Positive for shortness of breath. Negative for cough and wheezing.    Endocrine: Negative for heat intolerance.   Skin: Negative for rash.   Musculoskeletal: Negative for arthritis and myalgias.   Gastrointestinal: Negative for abdominal pain, melena, nausea and vomiting.   Genitourinary: Negative for dysuria and hematuria.   Neurological: Negative for dizziness, light-headedness, numbness and tremors.   Psychiatric/Behavioral: Negative for depression. The patient is nervous/anxious.        Procedures    Procedures    ECG 12 Lead    (Results Pending)   ECG 12 Lead    (Results Pending)           Objective:    /64   Pulse 51   Ht 170.2 cm (67.01\")   Wt 83.5 kg (184 lb)   BMI 28.81 kg/m²         Constitutional:       Appearance: Well-developed.   Eyes:      General: No scleral icterus.        Right eye: No discharge.   HENT:      Head: Normocephalic and atraumatic.   Neck:      Thyroid: No thyromegaly.      Lymphadenopathy: No cervical adenopathy.   Pulmonary:      Effort: Pulmonary effort is normal. No respiratory distress.      Breath sounds: Normal breath sounds. No wheezing. No rales.   Cardiovascular:      Normal rate. Regular rhythm.      No gallop.   Abdominal:      Tenderness: There is no abdominal tenderness.   Skin:     Findings: No erythema or rash.   Neurological:      Mental Status: Alert and oriented to person, place, and time.             Assessment:       Diagnosis Plan   1. Coronary artery disease involving native coronary artery of native heart without angina pectoris  ECG 12 Lead    Case Request Cath Lab: Percutaneous Coronary Intervention    CBC (No Diff)    Basic Metabolic Panel    Protime-INR    aPTT    ECG 12 Lead   2. New onset a-fib (CMS/HCC)  ECG 12 Lead    Case Request Cath Lab: Percutaneous Coronary Intervention    CBC (No Diff)    Basic Metabolic Panel    Protime-INR    aPTT    ECG 12 Lead   3. Mixed hyperlipidemia  ECG 12 Lead    Case Request Cath Lab: Percutaneous " Coronary Intervention    CBC (No Diff)    Basic Metabolic Panel    Protime-INR    aPTT    ECG 12 Lead   4. Non-ischemic cardiomyopathy (CMS/HCC)  Case Request Cath Lab: Percutaneous Coronary Intervention    CBC (No Diff)    Basic Metabolic Panel    Protime-INR    aPTT    ECG 12 Lead            Plan:       MDM:    1.  CAD:    Patient underwent LAD stenting but patient has high-grade stenosis of RCA.  I would recommend to proceed with stenting of RCA.  Risk and benefit are explained.  Continue DAPT    2.  Atrial fibrillation:    Continue amiodarone for now.  After 1 to 2 months I would discontinue atrial fibrillation    3.  Hyperlipidemia:    Patient is on Lipitor.    4.  Cardiomyopathy:    Repeat echocardiogram in 3 to 4 months

## 2021-07-14 ENCOUNTER — OFFICE VISIT (OUTPATIENT)
Dept: CARDIOLOGY | Facility: CLINIC | Age: 61
End: 2021-07-14

## 2021-07-14 VITALS
BODY MASS INDEX: 28.88 KG/M2 | WEIGHT: 184 LBS | DIASTOLIC BLOOD PRESSURE: 64 MMHG | HEIGHT: 67 IN | SYSTOLIC BLOOD PRESSURE: 118 MMHG | HEART RATE: 51 BPM

## 2021-07-14 DIAGNOSIS — I42.8 NON-ISCHEMIC CARDIOMYOPATHY (HCC): ICD-10-CM

## 2021-07-14 DIAGNOSIS — E78.2 MIXED HYPERLIPIDEMIA: ICD-10-CM

## 2021-07-14 DIAGNOSIS — I48.91 NEW ONSET A-FIB (HCC): ICD-10-CM

## 2021-07-14 DIAGNOSIS — I25.10 CORONARY ARTERY DISEASE INVOLVING NATIVE CORONARY ARTERY OF NATIVE HEART WITHOUT ANGINA PECTORIS: Primary | ICD-10-CM

## 2021-07-14 PROCEDURE — 99214 OFFICE O/P EST MOD 30 MIN: CPT | Performed by: INTERNAL MEDICINE

## 2021-07-14 PROCEDURE — 93000 ELECTROCARDIOGRAM COMPLETE: CPT | Performed by: INTERNAL MEDICINE

## 2021-07-22 LAB
QT INTERVAL: 313 MS
QT INTERVAL: 384 MS
QT INTERVAL: 395 MS
QT INTERVAL: 414 MS
QT INTERVAL: 478 MS

## 2021-08-02 ENCOUNTER — TELEPHONE (OUTPATIENT)
Dept: CARDIOLOGY | Facility: CLINIC | Age: 61
End: 2021-08-02

## 2021-08-06 ENCOUNTER — OFFICE VISIT (OUTPATIENT)
Dept: CARDIOLOGY | Facility: CLINIC | Age: 61
End: 2021-08-06

## 2021-08-06 VITALS
HEART RATE: 50 BPM | SYSTOLIC BLOOD PRESSURE: 108 MMHG | RESPIRATION RATE: 18 BRPM | BODY MASS INDEX: 28.41 KG/M2 | DIASTOLIC BLOOD PRESSURE: 78 MMHG | WEIGHT: 181 LBS | HEIGHT: 67 IN | OXYGEN SATURATION: 99 %

## 2021-08-06 DIAGNOSIS — E78.2 MIXED HYPERLIPIDEMIA: ICD-10-CM

## 2021-08-06 DIAGNOSIS — Z95.5 H/O PLACEMENT OF STENT IN ANTERIOR DESCENDING BRANCH OF LEFT CORONARY ARTERY: ICD-10-CM

## 2021-08-06 DIAGNOSIS — I48.91 NEW ONSET A-FIB (HCC): ICD-10-CM

## 2021-08-06 DIAGNOSIS — I21.29 ACUTE LATERAL MYOCARDIAL INFARCTION (HCC): Primary | ICD-10-CM

## 2021-08-06 DIAGNOSIS — I25.10 CORONARY ARTERY DISEASE INVOLVING NATIVE CORONARY ARTERY OF NATIVE HEART WITHOUT ANGINA PECTORIS: ICD-10-CM

## 2021-08-06 DIAGNOSIS — I25.10 STENOSIS OF RIGHT CORONARY ARTERY: ICD-10-CM

## 2021-08-06 PROCEDURE — 99214 OFFICE O/P EST MOD 30 MIN: CPT | Performed by: INTERNAL MEDICINE

## 2021-08-06 PROCEDURE — 93000 ELECTROCARDIOGRAM COMPLETE: CPT | Performed by: INTERNAL MEDICINE

## 2021-08-06 RX ORDER — ATORVASTATIN CALCIUM 40 MG/1
40 TABLET, FILM COATED ORAL NIGHTLY
Qty: 90 TABLET | Refills: 3 | Status: ON HOLD | OUTPATIENT
Start: 2021-08-06 | End: 2021-08-19 | Stop reason: SDUPTHER

## 2021-08-06 RX ORDER — AMIODARONE HYDROCHLORIDE 200 MG/1
200 TABLET ORAL DAILY
Qty: 60 TABLET | Refills: 3 | Status: SHIPPED | OUTPATIENT
Start: 2021-08-06 | End: 2021-09-03 | Stop reason: SDUPTHER

## 2021-08-06 RX ORDER — CARVEDILOL 3.12 MG/1
3.12 TABLET ORAL 2 TIMES DAILY
Qty: 180 TABLET | Refills: 3 | Status: SHIPPED | OUTPATIENT
Start: 2021-08-06 | End: 2022-06-15 | Stop reason: SDUPTHER

## 2021-08-06 NOTE — H&P (VIEW-ONLY)
Date of Office Visit: 2021  Encounter Provider: Yamilex Avila MD  Place of Service: [facility] CARDIOLOGY  Patient Name: Brayden Rainey  :1960      Patient ID:  Brayden Rainey is a 60 y.o. male is here for CAD, history of AMI 2021.          History of Present Illness    He was admitted -2021 with chest pain.  He was diagnosed on arrival to [facility] with an acute ST elevation myocardial infarction involving the lateral wall.  Prior to this, he had no history of coronary artery disease but did have a history of anxiety and posttraumatic stress disorder.  His cardiac catheterization on 2021 showed normal left main coronary artery, 8090% proximal LAD stenosis, normal circumflex, 80% mid RCA stenosis.  He received a 2.5 x 23 mm drug-eluting stent to the LAD.  A small diagonal branch was 100% occluded.  Echocardiogram done 2021 showed ejection fraction 55% with akinesis of the distal anterior wall and apex as well as distal septum with normal diastolic function.  He had normal bilateral lower extremity venous duplex studies.    Labs showed a peak troponin of 3.77.  On 2021, glucose 120, otherwise normal CMP, normal CBC.  Lipids on 2021 showed total cholesterol 196, HDL 49, , triglycerides 81    He has no chest pain, dyspnea, orthopnea, PND, tachycardia or dizziness.  He is taking his medications as directed without difficulty.    Past Medical History:   Diagnosis Date   • Anxiety 2021   • Atrial fibrillation (CMS/HCC)    • Coronary artery disease    • Hyperlipidemia    • PTSD (post-traumatic stress disorder) 2021         Past Surgical History:   Procedure Laterality Date   • CARDIAC CATHETERIZATION N/A 2021    Procedure: Left Heart Cath;  Surgeon: Jericho Lai MD;  Location: [facility] INVASIVE LOCATION;  Service: Cardiology;  Laterality: N/A;   • CARDIAC CATHETERIZATION N/A 2021    Procedure: Percutaneous  Coronary Intervention;  Surgeon: Jericho Lai MD;  Location: [facility] CATH INVASIVE LOCATION;  Service: Cardiology;  Laterality: N/A;   • CARDIAC CATHETERIZATION N/A 7/4/2021    Procedure: Stent MONA coronary;  Surgeon: Jericho Lai MD;  Location: [facility] CATH INVASIVE LOCATION;  Service: Cardiology;  Laterality: N/A;   • HERNIA REPAIR     • LIPOMA EXCISION         Current Outpatient Medications on File Prior to Visit   Medication Sig Dispense Refill   • aspirin 81 MG EC tablet Take 1 tablet by mouth Daily. 30 tablet 3   • clopidogrel (PLAVIX) 75 MG tablet Take 1 tablet by mouth Daily. 30 tablet 5   • escitalopram (LEXAPRO) 20 MG tablet Take 20 mg by mouth Daily.     • lisinopril (PRINIVIL,ZESTRIL) 2.5 MG tablet Take 1 tablet by mouth Daily. 30 tablet 3   • [DISCONTINUED] amiodarone (PACERONE) 200 MG tablet Take 1 tablet by mouth Every 12 (Twelve) Hours. 60 tablet 3   • [DISCONTINUED] atorvastatin (LIPITOR) 20 MG tablet Take 1 tablet by mouth Every Night. 30 tablet 3   • [DISCONTINUED] metoprolol tartrate (LOPRESSOR) 25 MG tablet Take 0.5 tablets by mouth Every 12 (Twelve) Hours. (Patient taking differently: Take 12.5 mg by mouth Daily.) 30 tablet 3     No current facility-administered medications on file prior to visit.       Social History     Socioeconomic History   • Marital status: Single     Spouse name: Not on file   • Number of children: Not on file   • Years of education: Not on file   • Highest education level: Not on file   Tobacco Use   • Smoking status: Former Smoker   • Smokeless tobacco: Never Used   Vaping Use   • Vaping Use: Never used   Substance and Sexual Activity   • Alcohol use: Yes     Alcohol/week: 1.0 standard drinks     Types: 1 Glasses of wine per week     Comment: occasional   • Drug use: Never   • Sexual activity: Defer           ROS    Procedures    ECG 12 Lead    Date/Time: 8/6/2021 11:49 AM  Performed by: Yamilex Avila MD  Authorized by: Yamilex Avila MD   Comparison:  "compared with previous ECG   Similar to previous ECG  Rhythm: sinus rhythm  Q waves: I, aVL, V2, V3, V4, V5 and V6    T inversion: I, aVL, V2, V3, V4, V5 and V6    Clinical impression: abnormal EKG                Objective:      Vitals:    08/06/21 1126   BP: 108/78   BP Location: Right arm   Patient Position: Lying   Cuff Size: Adult   Pulse: 50   Resp: 18   SpO2: 99%   Weight: 82.1 kg (181 lb)   Height: 170.2 cm (67\")     Body mass index is 28.35 kg/m².    Vitals reviewed.   Constitutional:       General: Not in acute distress.     Appearance: Well-developed. Not diaphoretic.   Eyes:      General: No scleral icterus.     Conjunctiva/sclera: Conjunctivae normal.   HENT:      Head: Normocephalic and atraumatic.   Neck:      Thyroid: No thyromegaly.      Vascular: No carotid bruit or JVD.      Lymphadenopathy: No cervical adenopathy.   Pulmonary:      Effort: Pulmonary effort is normal. No respiratory distress.      Breath sounds: Normal breath sounds. No wheezing. No rhonchi. No rales.   Chest:      Chest wall: Not tender to palpatation.   Cardiovascular:      Normal rate. Regular rhythm.      Murmurs: There is no murmur.      No gallop.   Pulses:     Intact distal pulses.   Edema:     Peripheral edema absent.   Abdominal:      General: Bowel sounds are normal. There is no distension or abdominal bruit.      Palpations: Abdomen is soft. There is no abdominal mass.      Tenderness: There is no abdominal tenderness.   Musculoskeletal:         General: No deformity.      Extremities: No clubbing present.     Cervical back: Neck supple. Skin:     General: Skin is warm and dry. There is no cyanosis.      Coloration: Skin is not pale.      Findings: No rash.   Neurological:      Mental Status: Alert and oriented to person, place, and time.      Cranial Nerves: No cranial nerve deficit.   Psychiatric:         Judgment: Judgment normal.         Lab Review:       Assessment:      Diagnosis Plan   1. Acute lateral myocardial " infarction (CMS/[facility])  Case Request Cath Lab: Coronary angiography, Left heart cath, Left ventriculography   2. Coronary artery disease involving native coronary artery of native heart without angina pectoris     3. Mixed hyperlipidemia     4. New onset a-fib (CMS/[facility])     5. Stenosis of right coronary artery  Case Request Cath Lab: Coronary angiography, Left heart cath, Left ventriculography   6. H/O placement of stent in anterior descending branch of left coronary artery       1. CAD, status post LAD stent in response to an acute ST elevation myocardial infarction, residual anterior septal and apical hypokinesis.  Severe RCA stenosis untreated, set for repeat cardiac catheterization for intervention on this.  2. Hypertension, well treated  3. Atrial fibrillation in the hospital, now sinus bradycardia, decrease amiodarone to 200 mg daily with the intent to discontinue this in the future  4. Hyperlipidemia, on atorvastatin, increase to 40mg daily.     Plan:       Continue aspirin and Plavix, stop metoprolol and start carvedilol 3.125 mg twice daily due to his wall motion abnormalities.  Overall doing well, see Kyra in 6 weeks.

## 2021-08-06 NOTE — PROGRESS NOTES
Date of Office Visit: 2021  Encounter Provider: Yamilex Avila MD  Place of Service: [facility] CARDIOLOGY  Patient Name: Brayden Rainey  :1960      Patient ID:  Brayden Rainey is a 60 y.o. male is here for CAD, history of AMI 2021.          History of Present Illness    He was admitted -2021 with chest pain.  He was diagnosed on arrival to [facility] [place], operated by [facility] with an acute ST elevation myocardial infarction involving the lateral wall.  Prior to this, he had no history of coronary artery disease but did have a history of anxiety and posttraumatic stress disorder.  His cardiac catheterization on 2021 showed normal left main coronary artery, 8090% proximal LAD stenosis, normal circumflex, 80% mid RCA stenosis.  He received a 2.5 x 23 mm drug-eluting stent to the LAD.  A small diagonal branch was 100% occluded.  Echocardiogram done 2021 showed ejection fraction 55% with akinesis of the distal anterior wall and apex as well as distal septum with normal diastolic function.  He had normal bilateral lower extremity venous duplex studies.    Labs showed a peak troponin of 3.77.  On 2021, glucose 120, otherwise normal CMP, normal CBC.  Lipids on 2021 showed total cholesterol 196, HDL 49, , triglycerides 81    He has no chest pain, dyspnea, orthopnea, PND, tachycardia or dizziness.  He is taking his medications as directed without difficulty.    Past Medical History:   Diagnosis Date   • Anxiety 2021   • Atrial fibrillation (CMS/HCC)    • Coronary artery disease    • Hyperlipidemia    • PTSD (post-traumatic stress disorder) 2021         Past Surgical History:   Procedure Laterality Date   • CARDIAC CATHETERIZATION N/A 2021    Procedure: Left Heart Cath;  Surgeon: Jericho Lai MD;  Location: [facility] INVASIVE LOCATION;  Service: Cardiology;  Laterality: N/A;   • CARDIAC CATHETERIZATION N/A 2021    Procedure: Percutaneous  Coronary Intervention;  Surgeon: Jericho Lai MD;  Location: [facility] CATH INVASIVE LOCATION;  Service: Cardiology;  Laterality: N/A;   • CARDIAC CATHETERIZATION N/A 7/4/2021    Procedure: Stent MONA coronary;  Surgeon: Jericho Lai MD;  Location: [facility] CATH INVASIVE LOCATION;  Service: Cardiology;  Laterality: N/A;   • HERNIA REPAIR     • LIPOMA EXCISION         Current Outpatient Medications on File Prior to Visit   Medication Sig Dispense Refill   • aspirin 81 MG EC tablet Take 1 tablet by mouth Daily. 30 tablet 3   • clopidogrel (PLAVIX) 75 MG tablet Take 1 tablet by mouth Daily. 30 tablet 5   • escitalopram (LEXAPRO) 20 MG tablet Take 20 mg by mouth Daily.     • lisinopril (PRINIVIL,ZESTRIL) 2.5 MG tablet Take 1 tablet by mouth Daily. 30 tablet 3   • [DISCONTINUED] amiodarone (PACERONE) 200 MG tablet Take 1 tablet by mouth Every 12 (Twelve) Hours. 60 tablet 3   • [DISCONTINUED] atorvastatin (LIPITOR) 20 MG tablet Take 1 tablet by mouth Every Night. 30 tablet 3   • [DISCONTINUED] metoprolol tartrate (LOPRESSOR) 25 MG tablet Take 0.5 tablets by mouth Every 12 (Twelve) Hours. (Patient taking differently: Take 12.5 mg by mouth Daily.) 30 tablet 3     No current facility-administered medications on file prior to visit.       Social History     Socioeconomic History   • Marital status: Single     Spouse name: Not on file   • Number of children: Not on file   • Years of education: Not on file   • Highest education level: Not on file   Tobacco Use   • Smoking status: Former Smoker   • Smokeless tobacco: Never Used   Vaping Use   • Vaping Use: Never used   Substance and Sexual Activity   • Alcohol use: Yes     Alcohol/week: 1.0 standard drinks     Types: 1 Glasses of wine per week     Comment: occasional   • Drug use: Never   • Sexual activity: Defer           ROS    Procedures    ECG 12 Lead    Date/Time: 8/6/2021 11:49 AM  Performed by: Yamilex Avila MD  Authorized by: Yamilex Avila MD   Comparison:  "compared with previous ECG   Similar to previous ECG  Rhythm: sinus rhythm  Q waves: I, aVL, V2, V3, V4, V5 and V6    T inversion: I, aVL, V2, V3, V4, V5 and V6    Clinical impression: abnormal EKG                Objective:      Vitals:    08/06/21 1126   BP: 108/78   BP Location: Right arm   Patient Position: Lying   Cuff Size: Adult   Pulse: 50   Resp: 18   SpO2: 99%   Weight: 82.1 kg (181 lb)   Height: 170.2 cm (67\")     Body mass index is 28.35 kg/m².    Vitals reviewed.   Constitutional:       General: Not in acute distress.     Appearance: Well-developed. Not diaphoretic.   Eyes:      General: No scleral icterus.     Conjunctiva/sclera: Conjunctivae normal.   HENT:      Head: Normocephalic and atraumatic.   Neck:      Thyroid: No thyromegaly.      Vascular: No carotid bruit or JVD.      Lymphadenopathy: No cervical adenopathy.   Pulmonary:      Effort: Pulmonary effort is normal. No respiratory distress.      Breath sounds: Normal breath sounds. No wheezing. No rhonchi. No rales.   Chest:      Chest wall: Not tender to palpatation.   Cardiovascular:      Normal rate. Regular rhythm.      Murmurs: There is no murmur.      No gallop.   Pulses:     Intact distal pulses.   Edema:     Peripheral edema absent.   Abdominal:      General: Bowel sounds are normal. There is no distension or abdominal bruit.      Palpations: Abdomen is soft. There is no abdominal mass.      Tenderness: There is no abdominal tenderness.   Musculoskeletal:         General: No deformity.      Extremities: No clubbing present.     Cervical back: Neck supple. Skin:     General: Skin is warm and dry. There is no cyanosis.      Coloration: Skin is not pale.      Findings: No rash.   Neurological:      Mental Status: Alert and oriented to person, place, and time.      Cranial Nerves: No cranial nerve deficit.   Psychiatric:         Judgment: Judgment normal.         Lab Review:       Assessment:      Diagnosis Plan   1. Acute lateral myocardial " infarction (CMS/[facility])  Case Request Cath Lab: Coronary angiography, Left heart cath, Left ventriculography   2. Coronary artery disease involving native coronary artery of native heart without angina pectoris     3. Mixed hyperlipidemia     4. New onset a-fib (CMS/[facility])     5. Stenosis of right coronary artery  Case Request Cath Lab: Coronary angiography, Left heart cath, Left ventriculography   6. H/O placement of stent in anterior descending branch of left coronary artery       1. CAD, status post LAD stent in response to an acute ST elevation myocardial infarction, residual anterior septal and apical hypokinesis.  Severe RCA stenosis untreated, set for repeat cardiac catheterization for intervention on this.  2. Hypertension, well treated  3. Atrial fibrillation in the hospital, now sinus bradycardia, decrease amiodarone to 200 mg daily with the intent to discontinue this in the future  4. Hyperlipidemia, on atorvastatin, increase to 40mg daily.     Plan:       Continue aspirin and Plavix, stop metoprolol and start carvedilol 3.125 mg twice daily due to his wall motion abnormalities.  Overall doing well, see Kyra in 6 weeks.

## 2021-08-10 ENCOUNTER — HOSPITAL ENCOUNTER (OUTPATIENT)
Facility: HOSPITAL | Age: 61
Setting detail: HOSPITAL OUTPATIENT SURGERY
End: 2021-08-10
Attending: INTERNAL MEDICINE | Admitting: INTERNAL MEDICINE

## 2021-08-10 ENCOUNTER — TRANSCRIBE ORDERS (OUTPATIENT)
Dept: CARDIOLOGY | Facility: CLINIC | Age: 61
End: 2021-08-10

## 2021-08-10 ENCOUNTER — LAB (OUTPATIENT)
Dept: LAB | Facility: HOSPITAL | Age: 61
End: 2021-08-10

## 2021-08-10 DIAGNOSIS — Z01.810 PRE-OPERATIVE CARDIOVASCULAR EXAMINATION: ICD-10-CM

## 2021-08-10 DIAGNOSIS — I21.29 ACUTE LATERAL MYOCARDIAL INFARCTION (HCC): ICD-10-CM

## 2021-08-10 DIAGNOSIS — I25.10 STENOSIS OF RIGHT CORONARY ARTERY: ICD-10-CM

## 2021-08-10 DIAGNOSIS — Z13.6 SCREENING FOR ISCHEMIC HEART DISEASE: ICD-10-CM

## 2021-08-10 DIAGNOSIS — Z01.810 PRE-OPERATIVE CARDIOVASCULAR EXAMINATION: Primary | ICD-10-CM

## 2021-08-10 LAB
ANION GAP SERPL CALCULATED.3IONS-SCNC: 6.7 MMOL/L (ref 5–15)
BASOPHILS # BLD AUTO: 0.04 10*3/MM3 (ref 0–0.2)
BASOPHILS NFR BLD AUTO: 0.8 % (ref 0–1.5)
BUN SERPL-MCNC: 12 MG/DL (ref 6–20)
BUN/CREAT SERPL: 10.8 (ref 7–25)
CALCIUM SPEC-SCNC: 9.3 MG/DL (ref 8.6–10.5)
CHLORIDE SERPL-SCNC: 103 MMOL/L (ref 98–107)
CO2 SERPL-SCNC: 27.3 MMOL/L (ref 22–29)
CREAT SERPL-MCNC: 1.11 MG/DL (ref 0.76–1.27)
DEPRECATED RDW RBC AUTO: 47.8 FL (ref 37–54)
EOSINOPHIL # BLD AUTO: 0.16 10*3/MM3 (ref 0–0.4)
EOSINOPHIL NFR BLD AUTO: 3.2 % (ref 0.3–6.2)
ERYTHROCYTE [DISTWIDTH] IN BLOOD BY AUTOMATED COUNT: 13.6 % (ref 12.3–15.4)
GFR SERPL CREATININE-BSD FRML MDRD: 68 ML/MIN/1.73
GLUCOSE SERPL-MCNC: 94 MG/DL (ref 65–99)
HCT VFR BLD AUTO: 40.2 % (ref 37.5–51)
HGB BLD-MCNC: 13.1 G/DL (ref 13–17.7)
IMM GRANULOCYTES # BLD AUTO: 0.01 10*3/MM3 (ref 0–0.05)
IMM GRANULOCYTES NFR BLD AUTO: 0.2 % (ref 0–0.5)
LYMPHOCYTES # BLD AUTO: 1.39 10*3/MM3 (ref 0.7–3.1)
LYMPHOCYTES NFR BLD AUTO: 27.5 % (ref 19.6–45.3)
MCH RBC QN AUTO: 31.3 PG (ref 26.6–33)
MCHC RBC AUTO-ENTMCNC: 32.6 G/DL (ref 31.5–35.7)
MCV RBC AUTO: 95.9 FL (ref 79–97)
MONOCYTES # BLD AUTO: 0.57 10*3/MM3 (ref 0.1–0.9)
MONOCYTES NFR BLD AUTO: 11.3 % (ref 5–12)
NEUTROPHILS NFR BLD AUTO: 2.88 10*3/MM3 (ref 1.7–7)
NEUTROPHILS NFR BLD AUTO: 57 % (ref 42.7–76)
NRBC BLD AUTO-RTO: 0 /100 WBC (ref 0–0.2)
PLATELET # BLD AUTO: 209 10*3/MM3 (ref 140–450)
PMV BLD AUTO: 9.6 FL (ref 6–12)
POTASSIUM SERPL-SCNC: 4.8 MMOL/L (ref 3.5–5.2)
RBC # BLD AUTO: 4.19 10*6/MM3 (ref 4.14–5.8)
SODIUM SERPL-SCNC: 137 MMOL/L (ref 136–145)
WBC # BLD AUTO: 5.05 10*3/MM3 (ref 3.4–10.8)

## 2021-08-10 PROCEDURE — 36415 COLL VENOUS BLD VENIPUNCTURE: CPT

## 2021-08-10 PROCEDURE — 80048 BASIC METABOLIC PNL TOTAL CA: CPT

## 2021-08-10 PROCEDURE — 85025 COMPLETE CBC W/AUTO DIFF WBC: CPT

## 2021-08-12 ENCOUNTER — TELEPHONE (OUTPATIENT)
Dept: CARDIOLOGY | Facility: CLINIC | Age: 61
End: 2021-08-12

## 2021-08-12 DIAGNOSIS — I25.10 CORONARY ARTERY DISEASE INVOLVING NATIVE CORONARY ARTERY OF NATIVE HEART WITHOUT ANGINA PECTORIS: Primary | ICD-10-CM

## 2021-08-12 NOTE — TELEPHONE ENCOUNTER
There is a peer to peer telephone call scheduled to with Dr. Lin 8/12/2021 at 1545  for his cardiac catheterization with FRANCIA Saxena. Please do not put him on hold. Come find Leta Newton MA or myself. Pull me out of a room if necessary. My clinic should be finished so I will either be in POD 1 at Dr. Shields's spot or in POD 3 in Dr. Phelps's spot. I have to speak with him at our scheduled time, please DO NOT put him thought to our voice mail.

## 2021-08-12 NOTE — TELEPHONE ENCOUNTER
"I spoke with Dr. Rex Lin for a peer to peer review from his insurance company. The patient needs intervention to his severe RCA stenosis from a cardiac catheterization 7/4/2021.  The order was placed for an angiogram, and was denied due to being a diagnostic cardiac catheterization.  In speaking with Dr. Lin he said that it was fine to proceed with catheterization if it was ordered as a percutaneous coronary intervention.  I have updated the order in the chart.  He would not give me a number for review because, \"if it is ordered as an intervention it does not need to be reviewed\".  It was only denied for review because it was ordered as a diagnostic catheterization.  I changed the order percutaneous coronary intervention and we are reaching out to the patient to reschedule.  "

## 2021-08-19 ENCOUNTER — HOSPITAL ENCOUNTER (OUTPATIENT)
Facility: HOSPITAL | Age: 61
Setting detail: HOSPITAL OUTPATIENT SURGERY
Discharge: HOME | End: 2021-08-19
Attending: INTERNAL MEDICINE | Admitting: INTERNAL MEDICINE

## 2021-08-19 VITALS
RESPIRATION RATE: 18 BRPM | TEMPERATURE: 97.9 F | HEIGHT: 72 IN | HEART RATE: 62 BPM | OXYGEN SATURATION: 98 % | WEIGHT: 180 LBS | DIASTOLIC BLOOD PRESSURE: 76 MMHG | BODY MASS INDEX: 24.38 KG/M2 | SYSTOLIC BLOOD PRESSURE: 112 MMHG

## 2021-08-19 DIAGNOSIS — I25.10 CORONARY ARTERY DISEASE INVOLVING NATIVE CORONARY ARTERY OF NATIVE HEART WITHOUT ANGINA PECTORIS: ICD-10-CM

## 2021-08-19 LAB
ACT BLD: 241 SECONDS (ref 82–152)
ACT BLD: 268 SECONDS (ref 82–152)

## 2021-08-19 PROCEDURE — 25010000002 FENTANYL CITRATE (PF) 50 MCG/ML SOLUTION: Performed by: INTERNAL MEDICINE

## 2021-08-19 PROCEDURE — 25010000002 HEPARIN (PORCINE) PER 1000 UNITS: Performed by: INTERNAL MEDICINE

## 2021-08-19 PROCEDURE — C1769 GUIDE WIRE: HCPCS | Performed by: INTERNAL MEDICINE

## 2021-08-19 PROCEDURE — C1874 STENT, COATED/COV W/DEL SYS: HCPCS | Performed by: INTERNAL MEDICINE

## 2021-08-19 PROCEDURE — C1887 CATHETER, GUIDING: HCPCS | Performed by: INTERNAL MEDICINE

## 2021-08-19 PROCEDURE — 92978 ENDOLUMINL IVUS OCT C 1ST: CPT | Performed by: INTERNAL MEDICINE

## 2021-08-19 PROCEDURE — C9600 PERC DRUG-EL COR STENT SING: HCPCS | Performed by: INTERNAL MEDICINE

## 2021-08-19 PROCEDURE — C1894 INTRO/SHEATH, NON-LASER: HCPCS | Performed by: INTERNAL MEDICINE

## 2021-08-19 PROCEDURE — 85347 COAGULATION TIME ACTIVATED: CPT

## 2021-08-19 PROCEDURE — C1725 CATH, TRANSLUMIN NON-LASER: HCPCS | Performed by: INTERNAL MEDICINE

## 2021-08-19 PROCEDURE — C1753 CATH, INTRAVAS ULTRASOUND: HCPCS | Performed by: INTERNAL MEDICINE

## 2021-08-19 PROCEDURE — 0 IOPAMIDOL PER 1 ML: Performed by: INTERNAL MEDICINE

## 2021-08-19 PROCEDURE — 92928 PRQ TCAT PLMT NTRAC ST 1 LES: CPT | Performed by: INTERNAL MEDICINE

## 2021-08-19 PROCEDURE — 25010000002 MIDAZOLAM PER 1 MG: Performed by: INTERNAL MEDICINE

## 2021-08-19 DEVICE — XIENCE SIERRA™ EVEROLIMUS ELUTING CORONARY STENT SYSTEM 3.00 MM X 38 MM / RAPID-EXCHANGE
Type: IMPLANTABLE DEVICE | Status: FUNCTIONAL
Brand: XIENCE SIERRA™

## 2021-08-19 DEVICE — XIENCE SIERRA™ EVEROLIMUS ELUTING CORONARY STENT SYSTEM 3.00 MM X 23 MM / RAPID-EXCHANGE
Type: IMPLANTABLE DEVICE | Status: FUNCTIONAL
Brand: XIENCE SIERRA™

## 2021-08-19 RX ORDER — LIDOCAINE HYDROCHLORIDE 20 MG/ML
INJECTION, SOLUTION INFILTRATION; PERINEURAL AS NEEDED
Status: DISCONTINUED | OUTPATIENT
Start: 2021-08-19 | End: 2021-08-19 | Stop reason: HOSPADM

## 2021-08-19 RX ORDER — HEPARIN SODIUM 1000 [USP'U]/ML
INJECTION, SOLUTION INTRAVENOUS; SUBCUTANEOUS AS NEEDED
Status: DISCONTINUED | OUTPATIENT
Start: 2021-08-19 | End: 2021-08-19 | Stop reason: HOSPADM

## 2021-08-19 RX ORDER — FENTANYL CITRATE 50 UG/ML
INJECTION, SOLUTION INTRAMUSCULAR; INTRAVENOUS AS NEEDED
Status: DISCONTINUED | OUTPATIENT
Start: 2021-08-19 | End: 2021-08-19 | Stop reason: HOSPADM

## 2021-08-19 RX ORDER — SODIUM CHLORIDE 0.9 % (FLUSH) 0.9 %
10 SYRINGE (ML) INJECTION AS NEEDED
Status: DISCONTINUED | OUTPATIENT
Start: 2021-08-19 | End: 2021-08-19 | Stop reason: HOSPADM

## 2021-08-19 RX ORDER — MIDAZOLAM HYDROCHLORIDE 1 MG/ML
INJECTION INTRAMUSCULAR; INTRAVENOUS AS NEEDED
Status: DISCONTINUED | OUTPATIENT
Start: 2021-08-19 | End: 2021-08-19 | Stop reason: HOSPADM

## 2021-08-19 RX ORDER — SODIUM CHLORIDE 9 MG/ML
75 INJECTION, SOLUTION INTRAVENOUS CONTINUOUS
Status: DISCONTINUED | OUTPATIENT
Start: 2021-08-19 | End: 2021-08-19 | Stop reason: HOSPADM

## 2021-08-19 RX ORDER — LIDOCAINE HYDROCHLORIDE 10 MG/ML
0.1 INJECTION, SOLUTION EPIDURAL; INFILTRATION; INTRACAUDAL; PERINEURAL ONCE AS NEEDED
Status: DISCONTINUED | OUTPATIENT
Start: 2021-08-19 | End: 2021-08-19 | Stop reason: HOSPADM

## 2021-08-19 RX ORDER — ATORVASTATIN CALCIUM 40 MG/1
40 TABLET, FILM COATED ORAL NIGHTLY
Qty: 90 TABLET | Refills: 3 | Status: SHIPPED | OUTPATIENT
Start: 2021-08-19 | End: 2021-11-01 | Stop reason: SDUPTHER

## 2021-08-19 RX ORDER — SODIUM CHLORIDE 0.9 % (FLUSH) 0.9 %
3 SYRINGE (ML) INJECTION EVERY 12 HOURS SCHEDULED
Status: DISCONTINUED | OUTPATIENT
Start: 2021-08-19 | End: 2021-08-19 | Stop reason: HOSPADM

## 2021-08-19 RX ORDER — ACETAMINOPHEN 325 MG/1
650 TABLET ORAL EVERY 4 HOURS PRN
Status: DISCONTINUED | OUTPATIENT
Start: 2021-08-19 | End: 2021-08-19 | Stop reason: HOSPADM

## 2021-08-19 RX ADMIN — SODIUM CHLORIDE 75 ML/HR: 9 INJECTION, SOLUTION INTRAVENOUS at 09:43

## 2021-08-19 NOTE — DISCHARGE INSTRUCTIONS
[facility]  [address] [place] [address]    Coronary Angioplasty with or without  Stent (Radial Approach) After Care    Refer to this sheet in the next few weeks. These instructions provide you with information on caring for yourself after your procedure. Your health care provider may also give you more specific instructions. Your treatment has been planned according to current medical practices, but problems sometimes occur. Call your health care provider if you have any problems or questions after your procedure.       Home Care Instructions:  · You may shower the day after the procedure. Remove the bandage (dressing) and gently wash the site with plain soap and water. Gently pat the site dry. You may apply a band aid daily for 2 days if desired.    · Do not apply powder or lotion to the site.  · Do not submerge the affected site in water for 3 to 5 days or until the site is completely healed.   · Do not flex or bend at the wrist with affected arm for 24 hours.  · Do not lift, push or pull anything over 5 pounds for 5 days after your procedure or as directed by your physician.  For a reference, a gallon of milk weighs 8 pounds.    · Do not operate machinery or power tools for 24 hours.  · Inspect the site at least twice daily. You may notice some bruising at the site and it may be tender for 1 to 2 weeks.     · Increase your fluid intake for the next 2 days.    · Keep arm elevated for 24 hours.  For the remainder of the day, keep your arm in the “Pledge of Allegiance” position when up and about.    · Limit your activity for the next 48 hours and avoid strenuous activity as directed by your physician.   · Cardiac Rehab may or may not be ordered.  Please consult with your physician  · You may drive 24 hours after the procedure unless otherwise instructed by your caregiver.  · A responsible adult should be with you for the first 24 hours after you arrive home.   · Do not make any important  legal decisions or sign legal papers for 24 hours. Do not drink alcohol for 24 hours.    · Take medicines only as directed by your health care provider.  Blood thinners may be prescribed after your procedure to improve blood flow through the stent.    · Metformin or any medications containing Metformin should not be taken for 48 hours after your procedure.    · Eat a heart-healthy diet. This should include plenty of fresh fruits and vegetables. Meat should be lean cuts. Avoid the following types of food:    ¨ Food that is high in salt.    ¨ Canned or highly processed food.    ¨ Food that is high in saturated fat or sugar.    ¨ Fried food.    · Make any other lifestyle changes recommended by your health care provider. This may include:    ¨ Not using any tobacco products including cigarettes, chewing tobacco, or electronic cigarettes.   ¨ Managing your weight.    ¨ Getting regular exercise.    ¨ Managing your blood pressure.    ¨ Limiting your alcohol intake.    ¨ Managing other health problems, such as diabetes.    · If you need an MRI after your heart stent was placed, be sure to tell the health care provider who orders the MRI that you have a heart stent.    · Keep all follow-up visits as directed by your health care provider.    ·   Call Your Doctor If:  · You have unusual pain at the radial/ulnar (wrist) site.  · You have redness, warmth, swelling, or pain at the radial/ulnar (wrist) site.  · You have drainage (other than a small amount of blood on the dressing).  · `You have chills or a fever > 101.  · Your arm becomes pale or dark, cool, tingly, or numb.  · You develop chest pain, shortness of breath, feel faint or pass out.    · You have any symptoms of a stroke.  Remember BE FAST  · B-balance. Sudden trouble walking or loss of balance.  · E-eyes.  Sudden changes in how you see or a sudden onset of a very bad headache.   · F-face. Sudden weakness or loss of feeling of the face or facial droop on one side.    · A-arms Sudden weakness or numbness in one arm.  One arm drifts down if they are both held out in front of you.   · S-speech.  Sudden trouble speaking, slurred speech or trouble understanding what are saying.   · T-time  Time to call emergency services.  Write down the symptoms and the time they started.   of loss of feeling in an arm.  This happens suddenly and usually on one side of the body.  · You have heavy bleeding from the site, hold pressure on the site for 20 minutes.  If the bleeding stops, apply a fresh bandage and call your cardiologist.  However, if you continue to have bleeding, call 911.        Make Sure You:   · Understand these instructions.  · Will watch your condition.  · Will get help right away if you are not doing well or get worse.

## 2021-08-19 NOTE — CONSULTS
Met with patient and his sister, discussed benefits of cardiac rehab. Provided phase II information along with the contact information for cardiac rehab at [facility]. Requested for referral to be sent.

## 2021-08-25 ENCOUNTER — TELEPHONE (OUTPATIENT)
Dept: CARDIOLOGY | Facility: CLINIC | Age: 61
End: 2021-08-25

## 2021-08-30 ENCOUNTER — OFFICE VISIT (OUTPATIENT)
Dept: CARDIOLOGY | Facility: CLINIC | Age: 61
End: 2021-08-30

## 2021-08-30 VITALS
BODY MASS INDEX: 25.06 KG/M2 | WEIGHT: 185 LBS | DIASTOLIC BLOOD PRESSURE: 70 MMHG | RESPIRATION RATE: 16 BRPM | SYSTOLIC BLOOD PRESSURE: 118 MMHG | OXYGEN SATURATION: 96 % | HEIGHT: 72 IN | HEART RATE: 58 BPM

## 2021-08-30 DIAGNOSIS — I25.10 CORONARY ARTERY DISEASE INVOLVING NATIVE CORONARY ARTERY OF NATIVE HEART WITHOUT ANGINA PECTORIS: Primary | ICD-10-CM

## 2021-08-30 DIAGNOSIS — I42.8 NON-ISCHEMIC CARDIOMYOPATHY (HCC): ICD-10-CM

## 2021-08-30 DIAGNOSIS — I48.91 NEW ONSET A-FIB (HCC): ICD-10-CM

## 2021-08-30 DIAGNOSIS — E78.2 MIXED HYPERLIPIDEMIA: ICD-10-CM

## 2021-08-30 DIAGNOSIS — Z95.5 H/O PLACEMENT OF STENT IN ANTERIOR DESCENDING BRANCH OF LEFT CORONARY ARTERY: ICD-10-CM

## 2021-08-30 PROCEDURE — 99214 OFFICE O/P EST MOD 30 MIN: CPT | Performed by: NURSE PRACTITIONER

## 2021-08-30 PROCEDURE — 93000 ELECTROCARDIOGRAM COMPLETE: CPT | Performed by: NURSE PRACTITIONER

## 2021-08-30 NOTE — PROGRESS NOTES
Date of Office Visit: 2021  Encounter Provider: FRANCIA Dennis  Place of Service: [facility] CARDIOLOGY  Patient Name: Brayden Rainey  :1960  Primary Cardiologist: Dr. Avila    CC:  Follow up after cath    Dear      HPI: Brayden Rainey is a pleasant 60 y.o. male who presents 2021 for cardiac follow up.  He is new patient to me and I reviewed his past medical records.  He is a patient of Dr. Avila.    He was admitted -2021 with chest pain.  He was diagnosed on arrival to [facility] with an acute ST elevation myocardial infarction involving the lateral wall.  Prior to this, he had no history of coronary artery disease but did have a history of anxiety and posttraumatic stress disorder.  His cardiac catheterization on 2021 showed normal left main coronary artery, 8090% proximal LAD stenosis, normal circumflex, 80% mid RCA stenosis.  He received a 2.5 x 23 mm drug-eluting stent to the LAD.  A small diagonal branch was 100% occluded.  Echocardiogram done 2021 showed ejection fraction 55% with akinesis of the distal anterior wall and apex as well as distal septum with normal diastolic function.  He had normal bilateral lower extremity venous duplex studies.     Labs showed a peak troponin of 3.77.  On 2021, glucose 120, otherwise normal CMP, normal CBC.  Lipids on 2021 showed total cholesterol 196, HDL 49, , triglycerides 81    2021 Cardiac Cath   LEFT VENTRICULOGRAPHY: The LV pressure was 124/16.  There was no gradient across the aortic valve on pullback.     CORONARY ANGIOGRAPHY:  Left main: Normal  Left anterior descending: Luminal irregularities proximally stents widely patent and looks good in the proximal LAD the mid and distal LAD are normal the first diagonal has a long area of disease at the beginning of it it is a small vessel that is not amenable to PCI  Ramus intermedius:Not present  Circumflex: Circumflex  looks good there is an AV groove branch with diffuse 30% disease in it proximally  RCA: Is a dominant vessel.  Normal proximally 90% mid lesion then a long area of disease beyond that there is probably 30 to 40% narrowed down the bifurcation is normal     SUMMARY: Successful PCI with placement of a 3.0 x 38 mm Xience Phyllis drug-eluting stent 10 at 14 seth in the distal and mid RCA and  a 3.0 x 23 mm Xience drug-eluting stent was implanted in the mid to proximal RCA at 14 seth.       RECOMMENDATIONS: Successful angioplasty drug-eluting stenting of the mid RCA adjuvant OCT therapy.     He states he is doing well.  He has not had any chest pain, shortness of breath, dizziness or lightheadedness.  He denies any fatigue.  He has not had any lower extremity edema.  He denies palpitations.  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  We discussed that he can perform those duties without limitations cardiac wise.  I have encouraged him to do cardiac rehab.  He is taking his medications as directed.  His blood pressure is well controlled.  His right wrist site with strong pulse, brisk capillary refill.      Past Medical History:   Diagnosis Date   • Anxiety 7/4/2021   • Atrial fibrillation (CMS/HCC)    • Coronary artery disease    • Hyperlipidemia    • PTSD (post-traumatic stress disorder) 7/4/2021       Past Surgical History:   Procedure Laterality Date   • CARDIAC CATHETERIZATION N/A 7/4/2021    Procedure: Left Heart Cath;  Surgeon: Jericho Lai MD;  Location: [facility] CATH INVASIVE LOCATION;  Service: Cardiology;  Laterality: N/A;   • CARDIAC CATHETERIZATION N/A 7/4/2021    Procedure: Percutaneous Coronary Intervention;  Surgeon: Jericho Lai MD;  Location: [facility] CATH INVASIVE LOCATION;  Service: Cardiology;  Laterality: N/A;   • CARDIAC CATHETERIZATION N/A 7/4/2021    Procedure: Stent MONA coronary;  Surgeon: Jericho Lai MD;  Location:   [facility] CATH INVASIVE LOCATION;  Service: Cardiology;  Laterality: N/A;   • CARDIAC CATHETERIZATION N/A 8/19/2021    Procedure: STENT MONA - CORONARY;  Surgeon: Fer Henry MD;  Location: [facility] CATH INVASIVE LOCATION;  Service: Cardiovascular;  Laterality: N/A;   • CARDIAC CATHETERIZATION N/A 8/19/2021    Procedure: Left Heart Cath;  Surgeon: Fer Henry MD;  Location: [facility] CATH INVASIVE LOCATION;  Service: Cardiovascular;  Laterality: N/A;   • CARDIAC CATHETERIZATION N/A 8/19/2021    Procedure: Coronary angiography;  Surgeon: Fer Henry MD;  Location:  [facility] CATH INVASIVE LOCATION;  Service: Cardiovascular;  Laterality: N/A;   • CARDIAC CATHETERIZATION N/A 8/19/2021    Procedure: Optical Coherent Tomography;  Surgeon: Fer Henry MD;  Location: [facility] CATH INVASIVE LOCATION;  Service: Cardiovascular;  Laterality: N/A;   • CORONARY ANGIOPLASTY     • HERNIA REPAIR     • LIPOMA EXCISION     • SEPTOPLASTY         Social History     Socioeconomic History   • Marital status: Single     Spouse name: Not on file   • Number of children: Not on file   • Years of education: Not on file   • Highest education level: Not on file   Tobacco Use   • Smoking status: Former Smoker   • Smokeless tobacco: Never Used   Vaping Use   • Vaping Use: Never used   Substance and Sexual Activity   • Alcohol use: Yes     Alcohol/week: 1.0 standard drinks     Types: 1 Glasses of wine per week     Comment: occasional   • Drug use: Never   • Sexual activity: Defer       Family History   Problem Relation Age of Onset   • Heart attack Father    • Heart disease Father        The following portion of the patient's history were reviewed and updated as appropriate: past medical history, past surgical history, past social history, past family history, allergies, current medications, and problem list.    Review of Systems   Constitutional: Negative for diaphoresis, fever and malaise/fatigue.   HENT: Negative for congestion, hearing  loss, hoarse voice, nosebleeds and sore throat.    Eyes: Negative for photophobia, vision loss in left eye, vision loss in right eye and visual disturbance.   Cardiovascular: Negative for chest pain, dyspnea on exertion, irregular heartbeat, leg swelling, near-syncope, orthopnea, palpitations, paroxysmal nocturnal dyspnea and syncope.   Respiratory: Negative for cough, hemoptysis, shortness of breath, sleep disturbances due to breathing, snoring, sputum production and wheezing.    Endocrine: Negative for cold intolerance, heat intolerance, polydipsia, polyphagia and polyuria.   Hematologic/Lymphatic: Negative for bleeding problem. Does not bruise/bleed easily.   Skin: Negative for color change, dry skin, poor wound healing, rash and suspicious lesions.   Musculoskeletal: Negative for arthritis, back pain, falls, gout, joint pain, joint swelling, muscle cramps, muscle weakness and myalgias.   Gastrointestinal: Negative for bloating, abdominal pain, constipation, diarrhea, dysphagia, melena, nausea and vomiting.   Neurological: Negative for excessive daytime sleepiness, dizziness, headaches, light-headedness, loss of balance, numbness, paresthesias, seizures, vertigo and weakness.   Psychiatric/Behavioral: Negative for depression, memory loss and substance abuse. The patient is not nervous/anxious.        No Known Allergies      Current Outpatient Medications:   •  amiodarone (PACERONE) 200 MG tablet, Take 1 tablet by mouth Daily., Disp: 60 tablet, Rfl: 3  •  aspirin 81 MG EC tablet, Take 1 tablet by mouth Daily., Disp: 30 tablet, Rfl: 3  •  atorvastatin (LIPITOR) 40 MG tablet, Take 1 tablet by mouth Every Night., Disp: 90 tablet, Rfl: 3  •  carvedilol (COREG) 3.125 MG tablet, Take 1 tablet by mouth 2 (Two) Times a Day., Disp: 180 tablet, Rfl: 3  •  clopidogrel (PLAVIX) 75 MG tablet, Take 1 tablet by mouth Daily., Disp: 30 tablet, Rfl: 5  •  escitalopram (LEXAPRO) 20 MG tablet, Take 20 mg by mouth Daily., Disp: , Rfl:    •  lisinopril (PRINIVIL,ZESTRIL) 2.5 MG tablet, Take 1 tablet by mouth Daily., Disp: 30 tablet, Rfl: 3        Objective:     There were no vitals filed for this visit.  There is no height or weight on file to calculate BMI.      Vitals reviewed.   Constitutional:       General: Not in acute distress.     Appearance: Healthy appearance. Well-developed, well-groomed, normal weight and not in distress.   Eyes:      General:         Right eye: No discharge.         Left eye: No discharge.      Conjunctiva/sclera: Conjunctivae normal.   HENT:      Head: Normocephalic and atraumatic.      Right Ear: External ear normal.      Left Ear: External ear normal.      Nose: Nose normal.   Neck:      Thyroid: No thyromegaly.      Vascular: No JVD.      Trachea: No tracheal deviation.      Lymphadenopathy: No cervical adenopathy.   Pulmonary:      Effort: Pulmonary effort is normal. No respiratory distress.      Breath sounds: Normal breath sounds. No wheezing. No rales.   Chest:      Chest wall: Not tender to palpatation.   Cardiovascular:      Normal rate. Regular rhythm.      No gallop.   Pulses:     Intact distal pulses.   Edema:     Peripheral edema absent.   Abdominal:      General: There is no distension.      Palpations: Abdomen is soft.      Tenderness: There is no abdominal tenderness.   Musculoskeletal: Normal range of motion.         General: No tenderness or deformity.      Cervical back: Normal range of motion and neck supple. Skin:     General: Skin is warm and dry.      Findings: No erythema or rash.   Neurological:      Mental Status: Alert and oriented to person, place, and time.      Coordination: Coordination normal.   Psychiatric:         Attention and Perception: Attention normal.         Mood and Affect: Mood normal.         Speech: Speech normal.         Behavior: Behavior normal. Behavior is cooperative.         Thought Content: Thought content normal.         Cognition and Memory: Cognition normal.          Judgment: Judgment normal.               ECG 12 Lead    Date/Time: 8/30/2021 9:46 AM  Performed by: Andie Arteaga APRN  Authorized by: Andie Arteaga APRN   Comparison: compared with previous ECG from 8/6/2021  Similar to previous ECG  Rhythm: sinus rhythm  Rate: normal  Conduction: conduction normal  Q waves: I, aVL, V1, V2, V3, V4, V5 and V6    ST Segments: ST segments normal  ST Depression: V4, V5 and V6  T inversion: I, V1, V2, V3, V4, V5 and V6  T flattening: aVL  QRS axis: normal    Clinical impression: abnormal EKG              Assessment:       Diagnosis Plan   1. Coronary artery disease involving native coronary artery of native heart without angina pectoris     2. H/O placement of stent in anterior descending branch of left coronary artery     3. Non-ischemic cardiomyopathy (CMS/HCC)     4. New onset a-fib (CMS/HCC)     5. Mixed hyperlipidemia            Plan:       1. CAD, status post LAD stent in response to an acute ST elevation myocardial infarction, residual anterior septal and apical hypokinesis.  8/19/2012 - S/P successful  PCI with placement of a 3.0 x 38 mm Xience Phyllis drug-eluting stent 10 at 14 seth in the distal and mid RCA and  a 3.0 x 23 mm Xience drug-eluting stent was implanted in the mid to proximal RCA at 14 seth.  Denies angina.  Cardiac rehab has been ordered.  He would like to attend in [place].  He is on dual antiplatelet therapy.  2. Hypertension-well treated  3. Atrial fibrillation in the hospital-he remains in sinus rhythm.  Amiodarone was cut down to 200 once a day with anticipation of being able to stop in the near future.  4.  Hyperlipidemia-continue lipid-lowering therapy.  He is on atorvastatin 40.    RTO in 4 weeks with FRANCIA Saxena.  Discuss amiodarone, hopefully be able to discontinue in the near future.    As always, it has been a pleasure to participate in your patient's care. Thank you.       Sincerely,       FRANCIA Dennis      Current Outpatient  Medications:   •  amiodarone (PACERONE) 200 MG tablet, Take 1 tablet by mouth Daily., Disp: 60 tablet, Rfl: 3  •  aspirin 81 MG EC tablet, Take 1 tablet by mouth Daily., Disp: 30 tablet, Rfl: 3  •  atorvastatin (LIPITOR) 40 MG tablet, Take 1 tablet by mouth Every Night., Disp: 90 tablet, Rfl: 3  •  carvedilol (COREG) 3.125 MG tablet, Take 1 tablet by mouth 2 (Two) Times a Day., Disp: 180 tablet, Rfl: 3  •  clopidogrel (PLAVIX) 75 MG tablet, Take 1 tablet by mouth Daily., Disp: 30 tablet, Rfl: 5  •  escitalopram (LEXAPRO) 20 MG tablet, Take 20 mg by mouth Daily., Disp: , Rfl:   •  lisinopril (PRINIVIL,ZESTRIL) 2.5 MG tablet, Take 1 tablet by mouth Daily., Disp: 30 tablet, Rfl: 3    Dictated utilizing Dragon dictation

## 2021-08-31 DIAGNOSIS — Z95.5 H/O PLACEMENT OF STENT IN ANTERIOR DESCENDING BRANCH OF LEFT CORONARY ARTERY: Primary | ICD-10-CM

## 2021-09-02 ENCOUNTER — TELEPHONE (OUTPATIENT)
Dept: CARDIAC REHAB | Facility: HOSPITAL | Age: 61
End: 2021-09-02

## 2021-09-02 NOTE — TELEPHONE ENCOUNTER
Returned pt's call from yesterday. Explained purpose, benefits and timeframe of cardiac rehab. Very nice marcos. He would like to attend but he just started a new job and he cannot make any of our exercise times. Pt says he was living in [place], [place], but lives in a mobile home and has had other jobs that required him moving around. He now lives in [facility]. I reviewed an exercise program for him to follow. Reviewed cardiac risk factors. Encouraged regular follow ups for medical care. I also explained importance of ASA and Plavix for stent patency. I explained exercise goal and his target heart rate. Pt very appreciative.

## 2021-09-03 RX ORDER — AMIODARONE HYDROCHLORIDE 200 MG/1
200 TABLET ORAL DAILY
Qty: 90 TABLET | Refills: 3 | Status: SHIPPED | OUTPATIENT
Start: 2021-09-03 | End: 2021-09-30

## 2021-09-03 RX ORDER — AMIODARONE HYDROCHLORIDE 200 MG/1
200 TABLET ORAL EVERY 12 HOURS SCHEDULED
Qty: 60 TABLET | Refills: 3 | Status: CANCELLED | OUTPATIENT
Start: 2021-09-03

## 2021-09-30 ENCOUNTER — OFFICE VISIT (OUTPATIENT)
Dept: CARDIOLOGY | Facility: CLINIC | Age: 61
End: 2021-09-30

## 2021-09-30 VITALS
WEIGHT: 189 LBS | DIASTOLIC BLOOD PRESSURE: 80 MMHG | BODY MASS INDEX: 25.6 KG/M2 | OXYGEN SATURATION: 98 % | SYSTOLIC BLOOD PRESSURE: 122 MMHG | RESPIRATION RATE: 16 BRPM | HEIGHT: 72 IN | HEART RATE: 55 BPM

## 2021-09-30 DIAGNOSIS — I48.91 NEW ONSET A-FIB (HCC): ICD-10-CM

## 2021-09-30 DIAGNOSIS — Z95.5 H/O PLACEMENT OF STENT IN ANTERIOR DESCENDING BRANCH OF LEFT CORONARY ARTERY: ICD-10-CM

## 2021-09-30 DIAGNOSIS — E78.2 MIXED HYPERLIPIDEMIA: ICD-10-CM

## 2021-09-30 DIAGNOSIS — I25.10 CORONARY ARTERY DISEASE INVOLVING NATIVE CORONARY ARTERY OF NATIVE HEART WITHOUT ANGINA PECTORIS: ICD-10-CM

## 2021-09-30 DIAGNOSIS — I42.8 NON-ISCHEMIC CARDIOMYOPATHY (HCC): ICD-10-CM

## 2021-09-30 DIAGNOSIS — I21.29 ACUTE LATERAL MYOCARDIAL INFARCTION (HCC): Primary | ICD-10-CM

## 2021-09-30 PROCEDURE — 93000 ELECTROCARDIOGRAM COMPLETE: CPT | Performed by: NURSE PRACTITIONER

## 2021-09-30 PROCEDURE — 99214 OFFICE O/P EST MOD 30 MIN: CPT | Performed by: NURSE PRACTITIONER

## 2021-09-30 RX ORDER — AMIODARONE HYDROCHLORIDE 200 MG/1
100 TABLET ORAL DAILY
Qty: 90 TABLET | Refills: 3
Start: 2021-09-30 | End: 2021-11-18

## 2021-09-30 NOTE — PROGRESS NOTES
Date of Office Visit: 2021  Encounter Provider: FRANCIA Maria  Place of Service: [facility] CARDIOLOGY  Patient Name: Brayden Rainey  :1960      Patient ID:  Brayden Rainey is a 60 y.o. male is here for followup for coronary artery disease, myocardial infarction, and atrial fibrillation.  He is a new patient to me and I reviewed his records in preparation for this visit.        History of Present Illness    He was admitted to [facility] 2021-2021 with acute chest pain.  He was found to be having an acute STEMI involving the lateral wall upon presentation.  This, he reports he had no history of coronary artery disease but did have a history of anxiety and posttraumatic stress disorder.  He underwent cardiac catheterization at [place] 2021 that revealed a normal left main coronary artery, 80 to 90% proximal LAD stenosis, normal left circumflex, 80% mild RCA stenosis.  He received 2.5 x 23 mm drug-eluting stent to the LAD.  He had a small diagonal branch that was 100% occluded.  He had an echocardiogram done 2021 that revealed an ejection fraction of 55% with akinesis of the distal anterior wall and apex as well as distal septum with normal diastolic function.  He had normal bilateral lower extremity venous duplex studies.  His lipid panel from 2021 revealed a total cholesterol of 196, HDL 49, , and triglycerides 81.  Post infarction, he developed new onset atrial fibrillation and required amiodarone.  They were able to get him back into normal sinus rhythm.    He saw Dr. Avila 2021 in the office.  He was not have any chest pain or pressure.  He did have a severe RCA stenosis that was left untreated.  He was set up for repeat cardiac catheterization for intervention on this.  He was continued on aspirin and Plavix, his metoprolol was stopped and he was started on carvedilol 3.125 mg twice daily due to his wall motion  abnormalities.      He underwent repeat cardiac catheterization 8/19/2021 that showed left ventricular pressure of 126/14.  There is no gradient across the aortic valve on pullback.  He had a normal left main, and the LAD there were luminal irregularities proximally, stents widely patent in the proximal LAD, the mid and distal LAD were normal, the first diagonal had a long area of disease the beginning.  Is a small vessel that was not amenable to PCI.  The circumflex looks good, there is an AV groove branch with a diffuse 30% disease proximally.  His RCA is dominant vessel, it is normal proximally, there is a 90% mid lesion, then a long area of disease beyond that there is probable 30 to 40% narrowed.  Down the bifurcation is normal.  He underwent successful PCI with a 3.0 x 38 mm Xience Phyllis drug-eluting stent to the distal and mid RCA and a 3.0 x 23 mm Xience drug-eluting stent to the mid to proximal RCA.    He saw FRANCIA Dennis 8/30/2021.  He was doing pretty well from a cardiac standpoint that time.  He was still in normal sinus rhythm.  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  He was encouraged to do cardiac rehab.  He was taking his medications as directed and his blood pressure was well controlled.  His right wrist site is noted to looked good, have a strong pulse, and brisk capillary refill.    He is here today for a 4-week follow-up.  He reports that his last visit his amiodarone was decreased to 200 mg once a day, prior to that he had been taken it 400 mg once a day.  He has not felt his heart racing or skipping.  Does report that about 6 months prior to his infarct he was having a sensation in his chest that felt like his heart would speed up, he got diaphoretic and would get dizzy.  He said it would last for about 10 to 15 minutes.  He is unsure if he was having atrial fibrillation at that time.  He knows that he was  not able to feel his atrial fibrillation in the hospital.  He has not felt his heart racing or skipping.  He has had no more lightheadedness, dizziness, syncope, or near syncope.  He has not had any shortness of breath, PND, or orthopnea.  He has no lower extremity edema.  He is working as a , and does a lot of computer work.  He gets pretty tired throughout the day, he is unsure if he snores as he lives alone.  He does require about a cup of coffee a day, and sometimes has to have a Coke in the evening.  He drinks about 1 or 2 beers a week.  He has not been able to start cardiac rehab due to his work schedule.  I did review his last echocardiogram results with him and strongly encouraged him to reconsider cardiac rehabilitation.  He is going to talk to his work and see about getting his work schedule changed so that he can do cardiac rehabilitation.  I will reorder this so they can reach back out to him to try to work on getting this scheduled.  He is walking 20 to 30 minutes a day.  They are not very strenuous walks, but he does try to get them daily.  He had some sharp pain in his chest the other day while he was washing dishes, but did not feel like the angina that he had with the STEMI.      Past Medical History:   Diagnosis Date   • Anxiety 7/4/2021   • Atrial fibrillation (CMS/HCC)    • Coronary artery disease    • Hyperlipidemia    • PTSD (post-traumatic stress disorder) 7/4/2021         Past Surgical History:   Procedure Laterality Date   • CARDIAC CATHETERIZATION N/A 7/4/2021    Procedure: Left Heart Cath;  Surgeon: Jericho Lai MD;  Location: [facility] CATH INVASIVE LOCATION;  Service: Cardiology;  Laterality: N/A;   • CARDIAC CATHETERIZATION N/A 7/4/2021    Procedure: Percutaneous Coronary Intervention;  Surgeon: Jericho Lai MD;  Location: [facility] CATH INVASIVE LOCATION;  Service: Cardiology;  Laterality: N/A;   • CARDIAC CATHETERIZATION N/A 7/4/2021    Procedure: Stent MONA coronary;  Surgeon: Ming  Jericho PATEL MD;  Location: [facility] CATH INVASIVE LOCATION;  Service: Cardiology;  Laterality: N/A;   • CARDIAC CATHETERIZATION N/A 8/19/2021    Procedure: STENT MONA - CORONARY;  Surgeon: Fer Henry MD;  Location: [facility] CATH INVASIVE LOCATION;  Service: Cardiovascular;  Laterality: N/A;   • CARDIAC CATHETERIZATION N/A 8/19/2021    Procedure: Left Heart Cath;  Surgeon: Fer Henry MD;  Location: [facility] CATH INVASIVE LOC[facility];  Service: Cardiovascular;  Laterality: N/A;   • CARDIAC CATHETERIZATION N/A 8/19/2021    Procedure: Coronary angiography;  Surgeon: Fer Henry MD;  Location:  [facility] CATH INVASIVE LOCATION;  Service: Cardiovascular;  Laterality: N/A;   • CARDIAC CATHETERIZATION N/A 8/19/2021    Procedure: Optical Coherent Tomography;  Surgeon: Fer Henry MD;  Location: [facility] CATH INVASIVE LOCATION;  Service: Cardiovascular;  Laterality: N/A;   • CORONARY ANGIOPLASTY     • HERNIA REPAIR     • LIPOMA EXCISION     • SEPTOPLASTY         Current Outpatient Medications on File Prior to Visit   Medication Sig Dispense Refill   • aspirin 81 MG EC tablet Take 1 tablet by mouth Daily. 30 tablet 3   • atorvastatin (LIPITOR) 40 MG tablet Take 1 tablet by mouth Every Night. 90 tablet 3   • carvedilol (COREG) 3.125 MG tablet Take 1 tablet by mouth 2 (Two) Times a Day. 180 tablet 3   • clopidogrel (PLAVIX) 75 MG tablet Take 1 tablet by mouth Daily. 30 tablet 5   • escitalopram (LEXAPRO) 20 MG tablet Take 20 mg by mouth Daily.     • lisinopril (PRINIVIL,ZESTRIL) 2.5 MG tablet Take 1 tablet by mouth Daily. 30 tablet 3   • [DISCONTINUED] amiodarone (PACERONE) 200 MG tablet Take 1 tablet by mouth Daily. 90 tablet 3     No current facility-administered medications on file prior to visit.       Social History     Socioeconomic History   • Marital status: Single     Spouse name: Not on file   • Number of children: Not on file   • Years of education: Not on file   • Highest education level: Not on file   Tobacco Use  "  • Smoking status: Former Smoker   • Smokeless tobacco: Never Used   • Tobacco comment: decaff or half caff   Vaping Use   • Vaping Use: Never used   Substance and Sexual Activity   • Alcohol use: Yes     Alcohol/week: 1.0 standard drinks     Types: 1 Glasses of wine per week     Comment: occasional   • Drug use: Never   • Sexual activity: Defer           Review of Systems   Constitutional: Negative for chills, decreased appetite, diaphoresis, fever and malaise/fatigue.   HENT: Negative for nosebleeds.    Eyes: Negative for blurred vision.   Cardiovascular: Negative for chest pain, claudication, cyanosis, dyspnea on exertion, irregular heartbeat, leg swelling, near-syncope, orthopnea, palpitations, paroxysmal nocturnal dyspnea and syncope.   Respiratory: Negative for cough, hemoptysis, shortness of breath, sleep disturbances due to breathing, snoring and wheezing.    Hematologic/Lymphatic: Negative for bleeding problem. Does not bruise/bleed easily.   Musculoskeletal: Negative for falls.   Gastrointestinal: Negative for heartburn.   Neurological: Negative for excessive daytime sleepiness, dizziness, headaches, light-headedness, numbness and weakness.       Procedures    ECG 12 Lead    Date/Time: 9/30/2021 1:19 PM  Performed by: Kyra Sun APRN  Authorized by: Kyra Sun APRN   Comparison: compared with previous ECG   Rhythm: sinus rhythm  T inversion: I, V1-V6 and aVL    Clinical impression: abnormal EKG                Objective:      Vitals:    09/30/21 0859   BP: 122/80   BP Location: Right arm   Patient Position: Sitting   Cuff Size: Adult   Pulse: 55   Resp: 16   SpO2: 98%   Weight: 85.7 kg (189 lb)   Height: 182.9 cm (72\")     Body mass index is 25.63 kg/m².    Constitutional:       Appearance: Normal and healthy appearance. Well-developed.   Eyes:      Conjunctiva/sclera: Conjunctivae normal.   Neck:      Vascular: No carotid bruit.   Pulmonary:      Effort: Pulmonary effort is normal.      " Breath sounds: Normal breath sounds.   Cardiovascular:      PMI at left midclavicular line. Normal rate. Regular rhythm.      Murmurs: There is no murmur.   Pulses:     Intact distal pulses.   Edema:     Peripheral edema absent.   Abdominal:      General: There is no abdominal bruit.   Neurological:      Mental Status: Alert.   Psychiatric:         Behavior: Behavior is cooperative.         Lab Review:       Assessment:      Diagnosis Plan   1. Acute lateral myocardial infarction (CMS/HCC)     2. Coronary artery disease involving native coronary artery of native heart without angina pectoris     3. H/O placement of stent in anterior descending branch of left coronary artery     4. Mixed hyperlipidemia     5. New onset a-fib (CMS/[facility])     6. Non-ischemic cardiomyopathy (CMS/[facility])       1. Coronary artery disease status post LAD stent in response to acute ST elevation MI, residual anterior septal and apical hypokinesis.  8/19/2012 status post successful PCI with placement of a 3 x 38 mm Xience Phyllis drug-eluting stent in the distal and mid RCA and a 3 x 23 mm Xience drug-eluting stent into the proximal RCA.  Cardiac rehabilitation was ordered, but he did not think he would be able to work his schedule around the rehab schedule.  He is reconsidering this.  He is on dual antiplatelet therapy.  2. Hypertension, well controlled.  3. Atrial fibrillation in the hospital.  He remains in sinus rhythm.  Amiodarone was decreased to 200 mg once a day 8/30/2021 with the anticipation of being able to stop in the near future.  He has not on anticoagulation for atrial fibrillation.  4. Hyperlipidemia-continue lipid-lowering therapy, he is on atorvastatin 40 mg.     Plan:       I have decreased his amiodarone to 100 mg a day, because he has reconsidered and is hoping to be able to get into cardiac rehab and start soon.  I have reordered cardiac rehabilitation so they will reach back out to him to get this set up.  I am going to have  him follow-up with me in 6 weeks.  If he is in cardiac rehabilitation at that time and his heart rhythm is tolerating exercise, we can stop his amiodarone.

## 2021-10-06 ENCOUNTER — OFFICE VISIT (OUTPATIENT)
Dept: CARDIAC REHAB | Facility: HOSPITAL | Age: 61
End: 2021-10-06

## 2021-10-06 DIAGNOSIS — I21.3 ST ELEVATION MYOCARDIAL INFARCTION (STEMI), UNSPECIFIED ARTERY (HCC): Primary | ICD-10-CM

## 2021-10-06 DIAGNOSIS — Z95.5 S/P DRUG ELUTING CORONARY STENT PLACEMENT: ICD-10-CM

## 2021-10-06 PROCEDURE — 93797 PHYS/QHP OP CAR RHAB WO ECG: CPT

## 2021-10-11 ENCOUNTER — TREATMENT (OUTPATIENT)
Dept: CARDIAC REHAB | Facility: HOSPITAL | Age: 61
End: 2021-10-11

## 2021-10-11 DIAGNOSIS — I21.3 ST ELEVATION MYOCARDIAL INFARCTION (STEMI), UNSPECIFIED ARTERY (HCC): Primary | ICD-10-CM

## 2021-10-11 PROCEDURE — 93798 PHYS/QHP OP CAR RHAB W/ECG: CPT

## 2021-10-13 ENCOUNTER — TREATMENT (OUTPATIENT)
Dept: CARDIAC REHAB | Facility: HOSPITAL | Age: 61
End: 2021-10-13

## 2021-10-13 DIAGNOSIS — I21.3 ST ELEVATION MYOCARDIAL INFARCTION (STEMI), UNSPECIFIED ARTERY (HCC): Primary | ICD-10-CM

## 2021-10-13 PROCEDURE — 93798 PHYS/QHP OP CAR RHAB W/ECG: CPT

## 2021-10-15 ENCOUNTER — TREATMENT (OUTPATIENT)
Dept: CARDIAC REHAB | Facility: HOSPITAL | Age: 61
End: 2021-10-15

## 2021-10-15 DIAGNOSIS — Z95.5 S/P DRUG ELUTING CORONARY STENT PLACEMENT: ICD-10-CM

## 2021-10-15 DIAGNOSIS — I21.3 ST ELEVATION MYOCARDIAL INFARCTION (STEMI), UNSPECIFIED ARTERY (HCC): Primary | ICD-10-CM

## 2021-10-15 PROCEDURE — 93798 PHYS/QHP OP CAR RHAB W/ECG: CPT

## 2021-10-18 ENCOUNTER — TREATMENT (OUTPATIENT)
Dept: CARDIAC REHAB | Facility: HOSPITAL | Age: 61
End: 2021-10-18

## 2021-10-18 DIAGNOSIS — Z95.5 S/P DRUG ELUTING CORONARY STENT PLACEMENT: ICD-10-CM

## 2021-10-18 DIAGNOSIS — I21.3 ST ELEVATION MYOCARDIAL INFARCTION (STEMI), UNSPECIFIED ARTERY (HCC): Primary | ICD-10-CM

## 2021-10-18 PROCEDURE — 93798 PHYS/QHP OP CAR RHAB W/ECG: CPT

## 2021-10-20 ENCOUNTER — TREATMENT (OUTPATIENT)
Dept: CARDIAC REHAB | Facility: HOSPITAL | Age: 61
End: 2021-10-20

## 2021-10-20 DIAGNOSIS — I21.3 ST ELEVATION MYOCARDIAL INFARCTION (STEMI), UNSPECIFIED ARTERY (HCC): Primary | ICD-10-CM

## 2021-10-20 PROCEDURE — 93798 PHYS/QHP OP CAR RHAB W/ECG: CPT

## 2021-10-22 ENCOUNTER — TREATMENT (OUTPATIENT)
Dept: CARDIAC REHAB | Facility: HOSPITAL | Age: 61
End: 2021-10-22

## 2021-10-22 DIAGNOSIS — I21.3 ST ELEVATION MYOCARDIAL INFARCTION (STEMI), UNSPECIFIED ARTERY (HCC): Primary | ICD-10-CM

## 2021-10-22 PROCEDURE — 93798 PHYS/QHP OP CAR RHAB W/ECG: CPT

## 2021-10-25 ENCOUNTER — TREATMENT (OUTPATIENT)
Dept: CARDIAC REHAB | Facility: HOSPITAL | Age: 61
End: 2021-10-25

## 2021-10-25 DIAGNOSIS — Z95.5 S/P DRUG ELUTING CORONARY STENT PLACEMENT: ICD-10-CM

## 2021-10-25 DIAGNOSIS — I21.3 ST ELEVATION MYOCARDIAL INFARCTION (STEMI), UNSPECIFIED ARTERY (HCC): Primary | ICD-10-CM

## 2021-10-25 PROCEDURE — 93798 PHYS/QHP OP CAR RHAB W/ECG: CPT

## 2021-10-27 ENCOUNTER — TREATMENT (OUTPATIENT)
Dept: CARDIAC REHAB | Facility: HOSPITAL | Age: 61
End: 2021-10-27

## 2021-10-27 DIAGNOSIS — Z95.5 S/P DRUG ELUTING CORONARY STENT PLACEMENT: ICD-10-CM

## 2021-10-27 DIAGNOSIS — I21.3 ST ELEVATION MYOCARDIAL INFARCTION (STEMI), UNSPECIFIED ARTERY (HCC): Primary | ICD-10-CM

## 2021-10-27 PROCEDURE — 93798 PHYS/QHP OP CAR RHAB W/ECG: CPT

## 2021-10-27 NOTE — PROGRESS NOTES
Attended Phase II Cardiac Rehab. See [facility], [facility] within [facility] for details.

## 2021-10-29 ENCOUNTER — APPOINTMENT (OUTPATIENT)
Dept: CARDIAC REHAB | Facility: HOSPITAL | Age: 61
End: 2021-10-29

## 2021-11-01 ENCOUNTER — TREATMENT (OUTPATIENT)
Dept: CARDIAC REHAB | Facility: HOSPITAL | Age: 61
End: 2021-11-01

## 2021-11-01 DIAGNOSIS — I21.3 ST ELEVATION MYOCARDIAL INFARCTION (STEMI), UNSPECIFIED ARTERY (HCC): Primary | ICD-10-CM

## 2021-11-01 PROCEDURE — 93798 PHYS/QHP OP CAR RHAB W/ECG: CPT

## 2021-11-01 RX ORDER — ATORVASTATIN CALCIUM 40 MG/1
40 TABLET, FILM COATED ORAL NIGHTLY
Qty: 90 TABLET | Refills: 3 | Status: SHIPPED | OUTPATIENT
Start: 2021-11-01 | End: 2022-07-22 | Stop reason: SDUPTHER

## 2021-11-01 RX ORDER — ASPIRIN 81 MG/1
81 TABLET ORAL DAILY
Qty: 30 TABLET | Refills: 3 | Status: SHIPPED | OUTPATIENT
Start: 2021-11-01 | End: 2021-11-18

## 2021-11-01 NOTE — TELEPHONE ENCOUNTER
Patient is calling asking for refills on his Aspirin 81 Mg and Lisinopril. States last time he saw Dr vAila she did not send it refills.     Wanted to make sure this is ok to fill before I sent it in.        Rx Refill Note  Requested Prescriptions     Pending Prescriptions Disp Refills   • aspirin 81 MG EC tablet 30 tablet 3     Sig: Take 1 tablet by mouth Daily.   • atorvastatin (LIPITOR) 40 MG tablet 90 tablet 3     Sig: Take 1 tablet by mouth Every Night.      Last office visit with prescribing clinician: 9/30/2021      Next office visit with prescribing clinician: 11/11/2021            Leta Newton MA  11/01/21, 12:10 EDT

## 2021-11-03 ENCOUNTER — TREATMENT (OUTPATIENT)
Dept: CARDIAC REHAB | Facility: HOSPITAL | Age: 61
End: 2021-11-03

## 2021-11-03 DIAGNOSIS — I21.3 ST ELEVATION MYOCARDIAL INFARCTION (STEMI), UNSPECIFIED ARTERY (HCC): Primary | ICD-10-CM

## 2021-11-03 PROCEDURE — 93798 PHYS/QHP OP CAR RHAB W/ECG: CPT

## 2021-11-05 ENCOUNTER — TREATMENT (OUTPATIENT)
Dept: CARDIAC REHAB | Facility: HOSPITAL | Age: 61
End: 2021-11-05

## 2021-11-05 DIAGNOSIS — I21.3 ST ELEVATION MYOCARDIAL INFARCTION (STEMI), UNSPECIFIED ARTERY (HCC): Primary | ICD-10-CM

## 2021-11-05 PROCEDURE — 93798 PHYS/QHP OP CAR RHAB W/ECG: CPT

## 2021-11-06 RX ORDER — LISINOPRIL 2.5 MG/1
2.5 TABLET ORAL
Qty: 30 TABLET | Refills: 3 | OUTPATIENT
Start: 2021-11-06 | End: 2021-11-08 | Stop reason: SDUPTHER

## 2021-11-06 NOTE — TELEPHONE ENCOUNTER
Patient called to ask if we could refill his lisinopril and send the prescription to the [facility] outpatient retail pharmacy.    Prescription called in for 2.5 mg lisinopril daily 30 pills x3 refills.

## 2021-11-08 ENCOUNTER — TREATMENT (OUTPATIENT)
Dept: CARDIAC REHAB | Facility: HOSPITAL | Age: 61
End: 2021-11-08

## 2021-11-08 DIAGNOSIS — I21.3 ST ELEVATION MYOCARDIAL INFARCTION (STEMI), UNSPECIFIED ARTERY (HCC): Primary | ICD-10-CM

## 2021-11-08 PROCEDURE — 93798 PHYS/QHP OP CAR RHAB W/ECG: CPT

## 2021-11-08 RX ORDER — LISINOPRIL 2.5 MG/1
2.5 TABLET ORAL
Qty: 30 TABLET | Refills: 3 | Status: SHIPPED | OUTPATIENT
Start: 2021-11-08 | End: 2021-11-08 | Stop reason: SDUPTHER

## 2021-11-08 RX ORDER — LISINOPRIL 2.5 MG/1
2.5 TABLET ORAL
Qty: 30 TABLET | Refills: 3 | Status: SHIPPED | OUTPATIENT
Start: 2021-11-08 | End: 2022-03-29 | Stop reason: SDUPTHER

## 2021-11-10 ENCOUNTER — TREATMENT (OUTPATIENT)
Dept: CARDIAC REHAB | Facility: HOSPITAL | Age: 61
End: 2021-11-10

## 2021-11-10 DIAGNOSIS — I21.3 ST ELEVATION MYOCARDIAL INFARCTION (STEMI), UNSPECIFIED ARTERY (HCC): Primary | ICD-10-CM

## 2021-11-10 PROCEDURE — 93798 PHYS/QHP OP CAR RHAB W/ECG: CPT

## 2021-11-12 ENCOUNTER — TREATMENT (OUTPATIENT)
Dept: CARDIAC REHAB | Facility: HOSPITAL | Age: 61
End: 2021-11-12

## 2021-11-12 DIAGNOSIS — I21.3 ST ELEVATION MYOCARDIAL INFARCTION (STEMI), UNSPECIFIED ARTERY (HCC): Primary | ICD-10-CM

## 2021-11-12 DIAGNOSIS — Z95.5 S/P DRUG ELUTING CORONARY STENT PLACEMENT: ICD-10-CM

## 2021-11-12 PROCEDURE — 93798 PHYS/QHP OP CAR RHAB W/ECG: CPT

## 2021-11-15 ENCOUNTER — TREATMENT (OUTPATIENT)
Dept: CARDIAC REHAB | Facility: HOSPITAL | Age: 61
End: 2021-11-15

## 2021-11-15 DIAGNOSIS — I21.3 ST ELEVATION MYOCARDIAL INFARCTION (STEMI), UNSPECIFIED ARTERY (HCC): Primary | ICD-10-CM

## 2021-11-15 PROCEDURE — 93798 PHYS/QHP OP CAR RHAB W/ECG: CPT

## 2021-11-17 ENCOUNTER — TREATMENT (OUTPATIENT)
Dept: CARDIAC REHAB | Facility: HOSPITAL | Age: 61
End: 2021-11-17

## 2021-11-17 DIAGNOSIS — I21.3 ST ELEVATION MYOCARDIAL INFARCTION (STEMI), UNSPECIFIED ARTERY (HCC): Primary | ICD-10-CM

## 2021-11-17 PROCEDURE — 93798 PHYS/QHP OP CAR RHAB W/ECG: CPT

## 2021-11-18 ENCOUNTER — OFFICE VISIT (OUTPATIENT)
Dept: CARDIOLOGY | Facility: CLINIC | Age: 61
End: 2021-11-18

## 2021-11-18 VITALS
WEIGHT: 192 LBS | HEART RATE: 56 BPM | OXYGEN SATURATION: 98 % | SYSTOLIC BLOOD PRESSURE: 112 MMHG | HEIGHT: 72 IN | DIASTOLIC BLOOD PRESSURE: 80 MMHG | BODY MASS INDEX: 26.01 KG/M2 | RESPIRATION RATE: 16 BRPM

## 2021-11-18 DIAGNOSIS — Z95.5 H/O PLACEMENT OF STENT IN ANTERIOR DESCENDING BRANCH OF LEFT CORONARY ARTERY: ICD-10-CM

## 2021-11-18 DIAGNOSIS — I42.8 NON-ISCHEMIC CARDIOMYOPATHY (HCC): ICD-10-CM

## 2021-11-18 DIAGNOSIS — I21.29 ACUTE LATERAL MYOCARDIAL INFARCTION (HCC): ICD-10-CM

## 2021-11-18 DIAGNOSIS — I25.10 CORONARY ARTERY DISEASE INVOLVING NATIVE CORONARY ARTERY OF NATIVE HEART WITHOUT ANGINA PECTORIS: Primary | ICD-10-CM

## 2021-11-18 DIAGNOSIS — E78.2 MIXED HYPERLIPIDEMIA: ICD-10-CM

## 2021-11-18 DIAGNOSIS — I48.91 NEW ONSET A-FIB (HCC): ICD-10-CM

## 2021-11-18 PROCEDURE — 99214 OFFICE O/P EST MOD 30 MIN: CPT | Performed by: NURSE PRACTITIONER

## 2021-11-18 PROCEDURE — 93000 ELECTROCARDIOGRAM COMPLETE: CPT | Performed by: NURSE PRACTITIONER

## 2021-11-18 RX ORDER — MULTIPLE VITAMINS W/ MINERALS TAB 9MG-400MCG
1 TAB ORAL DAILY
COMMUNITY

## 2021-11-18 NOTE — PROGRESS NOTES
Date of Office Visit: 2021  Encounter Provider: FRANCIA Maria  Place of Service: [facility] CARDIOLOGY  Patient Name: Brayden Rainey  :1960      Patient ID:  Brayden Rainey is a 61 y.o. male is here for followup for atrial fibrillation, myocardial infarction, and coronary artery disease.        History of Present Illness    He was admitted to [facility] 2021-2021 with acute chest pain.  He was found to be having an acute STEMI involving the lateral wall upon presentation.  This, he reports he had no history of coronary artery disease but did have a history of anxiety and posttraumatic stress disorder.  He underwent cardiac catheterization at [place] 2021 that revealed a normal left main coronary artery, 80 to 90% proximal LAD stenosis, normal left circumflex, 80% mild RCA stenosis.  He received 2.5 x 23 mm drug-eluting stent to the LAD.  He had a small diagonal branch that was 100% occluded.  He had an echocardiogram done 2021 that revealed an ejection fraction of 55% with akinesis of the distal anterior wall and apex as well as distal septum with normal diastolic function.  He had normal bilateral lower extremity venous duplex studies.  His lipid panel from 2021 revealed a total cholesterol of 196, HDL 49, , and triglycerides 81.  Post infarction, he developed new onset atrial fibrillation and required amiodarone.  They were able to get him back into normal sinus rhythm.     He saw Dr. Avila 2021 in the office.  He was not have any chest pain or pressure.  He did have a severe RCA stenosis that was left untreated.  He was set up for repeat cardiac catheterization for intervention on this.  He was continued on aspirin and Plavix, his metoprolol was stopped and he was started on carvedilol 3.125 mg twice daily due to his wall motion abnormalities.       He underwent repeat cardiac catheterization 2021 that showed  left ventricular pressure of 126/14.  There is no gradient across the aortic valve on pullback.  He had a normal left main, and the LAD there were luminal irregularities proximally, stents widely patent in the proximal LAD, the mid and distal LAD were normal, the first diagonal had a long area of disease the beginning.  Is a small vessel that was not amenable to PCI.  The circumflex looks good, there is an AV groove branch with a diffuse 30% disease proximally.  His RCA is dominant vessel, it is normal proximally, there is a 90% mid lesion, then a long area of disease beyond that there is probable 30 to 40% narrowed.  Down the bifurcation is normal.  He underwent successful PCI with a 3.0 x 38 mm Xience Phyllis drug-eluting stent to the distal and mid RCA and a 3.0 x 23 mm Xience drug-eluting stent to the mid to proximal RCA.     He saw FRANCIA Dennis 8/30/2021.  He was doing pretty well from a cardiac standpoint that time.  He was still in normal sinus rhythm.  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  He was encouraged to do cardiac rehab.  He was taking his medications as directed and his blood pressure was well controlled.  His right wrist site is noted to looked good, have a strong pulse, and brisk capillary refill.    I saw him in the office 9/30/2021 for a 4-week follow-up.  He was doing well from a cardiac standpoint.  He was working as a  and doing a lot of computer work.  He was not in cardiac rehab, but after we reviewed his echocardiogram results he did agree to reconsider.  I decreased his amiodarone 200 mg daily, encouraged him to start cardiac rehab, and discussed untreated obstructive sleep apnea and its correlations with atrial fibrillation.  He was noting that prior to his heart attack he was having some symptoms that looking back could have been paroxysmal as of atrial fibrillation for several months.  He was  feeling better at our follow-up.    He is here for a 6-week follow-up to reevaluate him after decreasing his amiodarone, and starting cardiac rehab.  His rehab has been doing incredibly well and his last visit will be Monday.  He does not think that he has had any more episodes of atrial fibrillation.  He denies any palpitations, shortness of breath, lower extremity edema, dizziness lightheadedness, or chest pain or pressure.  He has never been tested for obstructive sleep apnea, but does have some pretty significant daytime sleepiness.  He does report that he had several episodes prior to his cardiac catheterization that he felt like were atrial fibrillation.  He has not had his thyroid checked.  He does not have a primary care provider and is only been seen by the doctors at [place], and Dr. Avila.  He has never been tested for obstructive sleep apnea.  He is not currently on a blood thinner, but is on dual antiplatelet therapy from his stents.       Past Medical History:   Diagnosis Date   • Anxiety 7/4/2021   • Atrial fibrillation (HCC)    • Coronary artery disease    • Hyperlipidemia    • PTSD (post-traumatic stress disorder) 7/4/2021         Past Surgical History:   Procedure Laterality Date   • CARDIAC CATHETERIZATION N/A 7/4/2021    Procedure: Left Heart Cath;  Surgeon: Jericho Lai MD;  Location: [facility] INVASIVE LOCATION;  Service: Cardiology;  Laterality: N/A;   • CARDIAC CATHETERIZATION N/A 7/4/2021    Procedure: Percutaneous Coronary Intervention;  Surgeon: Jericho Lai MD;  Location: [facility] CATH INVASIVE LOCATION;  Service: Cardiology;  Laterality: N/A;   • CARDIAC CATHETERIZATION N/A 7/4/2021    Procedure: Stent MONA coronary;  Surgeon: Jericho Lai MD;  Location: [facility] CATH INVASIVE LOCATION;  Service: Cardiology;  Laterality: N/A;   • CARDIAC CATHETERIZATION N/A 8/19/2021    Procedure: STENT MONA - CORONARY;  Surgeon: Fer Henry MD;  Location: [facility] CATH INVASIVE LOCATION;  Service:  Cardiovascular;  Laterality: N/A;   • CARDIAC CATHETERIZATION N/A 8/19/2021    Procedure: Left Heart Cath;  Surgeon: Fer Henry MD;  Location:  [facility] CATH INVASIVE LOCATION;  Service: Cardiovascular;  Laterality: N/A;   • CARDIAC CATHETERIZATION N/A 8/19/2021    Procedure: Coronary angiography;  Surgeon: Fer Henry MD;  Location:  [facility] CATH INVASIVE LOCATION;  Service: Cardiovascular;  Laterality: N/A;   • CARDIAC CATHETERIZATION N/A 8/19/2021    Procedure: Optical Coherent Tomography;  Surgeon: Fer Henry MD;  Location:  [facility] CATH INVASIVE LOCATION;  Service: Cardiovascular;  Laterality: N/A;   • CORONARY ANGIOPLASTY     • HERNIA REPAIR     • LIPOMA EXCISION     • SEPTOPLASTY         Current Outpatient Medications on File Prior to Visit   Medication Sig Dispense Refill   • amiodarone (PACERONE) 200 MG tablet Take 0.5 tablets by mouth Daily. 90 tablet 3   • atorvastatin (LIPITOR) 40 MG tablet Take 1 tablet by mouth Every Night. 90 tablet 3   • carvedilol (COREG) 3.125 MG tablet Take 1 tablet by mouth 2 (Two) Times a Day. 180 tablet 3   • clopidogrel (PLAVIX) 75 MG tablet Take 1 tablet by mouth Daily. 30 tablet 5   • escitalopram (LEXAPRO) 20 MG tablet Take 1 tablet by mouth Daily. 90 tablet 3   • lisinopril (PRINIVIL,ZESTRIL) 2.5 MG tablet Take 1 tablet by mouth Daily. 30 tablet 3   • multivitamin with minerals (MULTIVITAMIN ADULTS PO) Take 1 tablet by mouth Daily.     • [DISCONTINUED] aspirin 81 MG EC tablet Take 1 tablet by mouth Daily. 30 tablet 3   • [DISCONTINUED] escitalopram (LEXAPRO) 20 MG tablet Take 20 mg by mouth Daily.       No current facility-administered medications on file prior to visit.       Social History     Socioeconomic History   • Marital status: Single   Tobacco Use   • Smoking status: Former Smoker   • Smokeless tobacco: Never Used   • Tobacco comment: decaff or half caff   Vaping Use   • Vaping Use: Never used   Substance and Sexual Activity   • Alcohol use: Yes      "Alcohol/week: 1.0 standard drink     Types: 1 Glasses of wine per week     Comment: occasional   • Drug use: Never   • Sexual activity: Defer           Review of Systems   Constitutional: Negative for chills, decreased appetite, diaphoresis, fever and malaise/fatigue.   HENT: Negative for nosebleeds.    Eyes: Negative for blurred vision.   Cardiovascular: Negative for chest pain, claudication, cyanosis, dyspnea on exertion, irregular heartbeat, leg swelling, near-syncope, orthopnea, palpitations, paroxysmal nocturnal dyspnea and syncope.   Respiratory: Negative for cough, hemoptysis, shortness of breath, sleep disturbances due to breathing, snoring and wheezing.    Hematologic/Lymphatic: Negative for bleeding problem. Does not bruise/bleed easily.   Musculoskeletal: Negative for falls.   Gastrointestinal: Negative for heartburn.   Neurological: Positive for excessive daytime sleepiness. Negative for dizziness, headaches, light-headedness, numbness and weakness.       Procedures    ECG 12 Lead    Date/Time: 11/18/2021 12:35 PM  Performed by: Kyra Snu APRN  Authorized by: Kyra Sun APRN   Comparison: compared with previous ECG from 9/30/2021  Rhythm: sinus rhythm  Rate: normal  BPM: 56  T inversion: V2, V5, V6, aVL, V1 and I  T flattening: V3  QRS axis: normal  Other findings: non-specific ST-T wave changes    Clinical impression: abnormal EKG                Objective:      Vitals:    11/18/21 0846   BP: 112/80   BP Location: Right arm   Patient Position: Sitting   Cuff Size: Adult   Pulse: 56   Resp: 16   SpO2: 98%   Weight: 87.1 kg (192 lb)   Height: 182.9 cm (72\")     Body mass index is 26.04 kg/m².    Constitutional:       Appearance: Normal and healthy appearance. Well-developed.   Eyes:      Conjunctiva/sclera: Conjunctivae normal.   Neck:      Vascular: No carotid bruit.   Pulmonary:      Effort: Pulmonary effort is normal.      Breath sounds: Normal breath sounds.   Cardiovascular:      PMI " at left midclavicular line. Normal rate. Regular rhythm.      Murmurs: There is no murmur.   Pulses:     Intact distal pulses.   Edema:     Peripheral edema absent.   Abdominal:      General: There is no abdominal bruit.   Neurological:      Mental Status: Alert.   Psychiatric:         Behavior: Behavior is cooperative.         Lab Review:       Assessment:      Diagnosis Plan   1. Coronary artery disease involving native coronary artery of native heart without angina pectoris  Holter Monitor - 72 Hour Up To 15 Days    Comprehensive Metabolic Panel    TSH    Lipid Panel   2. Acute lateral myocardial infarction (HCC)  Holter Monitor - 72 Hour Up To 15 Days    Comprehensive Metabolic Panel    TSH    Lipid Panel   3. H/O placement of stent in anterior descending branch of left coronary artery  Holter Monitor - 72 Hour Up To 15 Days    Comprehensive Metabolic Panel    TSH    Lipid Panel   4. New onset a-fib ([facility])  Holter Monitor - 72 Hour Up To 15 Days    Comprehensive Metabolic Panel    TSH    Lipid Panel   5. Non-ischemic cardiomyopathy ([facility])  Holter Monitor - 72 Hour Up To 15 Days    Comprehensive Metabolic Panel    TSH    Lipid Panel   6. Mixed hyperlipidemia  Holter Monitor - 72 Hour Up To 15 Days    Comprehensive Metabolic Panel    TSH    Lipid Panel     1. Coronary artery disease status post LAD stent in response to acute ST elevation MI, residual anterior septal and apical hypokinesis.  8/19/2012 status post successful PCI with placement of a 3 x 38 mm Xience Phyllis drug-eluting stent in the distal and mid RCA and a 3 x 23 mm Xience drug-eluting stent into the proximal RCA.  On DAPT aspirin and Plavix.  In cardiac rehab.  2. Hypertension, well controlled.  3. Atrial fibrillation in the hospital.  He remains in sinus rhythm.    Stopping his amiodarone today. He has not on anticoagulation for atrial fibrillation.  Start Xarelto 20 mg daily, patient prefers this to Eliquis due to cost.   4. Hyperlipidemia-continue  lipid-lowering therapy, he is on atorvastatin 40 mg.  Recheck lipids.      Plan:       I discussed his case with Dr. Avila.  Given that he had some episodes prior to his cath that felt like his atrial fibrillation we will start him on anticoagulation.  I was, start him on Eliquis, but he would prefer Xarelto as it is once a day, and cheaper.  I will stop his aspirin, but continue his plavix. We did discuss the risk of GI bleeding and I have advised him to call if he notices any.  I am going to put a 2-week monitor on him to evaluate his atrial fibrillation burden.  I am going to check a TSH since he has not had his thyroid checked.  I have also ordered a lipid panel and CMP as it is been about 4 months since he started his atorvastatin.  Overall he is feeling very well, and feels like cardiac rehab helped him a lot. We did discuss evaluating him for obstructive sleep apnea, he is agreeable, but would like to wait a few months due to financial reasons.  I told him just to call us whenever he is ready and we will put the order in for the referral.  I have encouraged him to exercise for 30 minutes 4 to 5 days a week after he finishes cardiac rehab.  He is agreeable.  He will follow-up with Dr. Avila in 6 months.

## 2021-11-19 ENCOUNTER — LAB (OUTPATIENT)
Dept: LAB | Facility: HOSPITAL | Age: 61
End: 2021-11-19

## 2021-11-19 ENCOUNTER — TREATMENT (OUTPATIENT)
Dept: CARDIAC REHAB | Facility: HOSPITAL | Age: 61
End: 2021-11-19

## 2021-11-19 ENCOUNTER — TELEPHONE (OUTPATIENT)
Dept: CARDIOLOGY | Facility: CLINIC | Age: 61
End: 2021-11-19

## 2021-11-19 DIAGNOSIS — I42.8 NON-ISCHEMIC CARDIOMYOPATHY (HCC): ICD-10-CM

## 2021-11-19 DIAGNOSIS — I21.29 ACUTE LATERAL MYOCARDIAL INFARCTION (HCC): ICD-10-CM

## 2021-11-19 DIAGNOSIS — Z95.5 S/P DRUG ELUTING CORONARY STENT PLACEMENT: ICD-10-CM

## 2021-11-19 DIAGNOSIS — E78.2 MIXED HYPERLIPIDEMIA: ICD-10-CM

## 2021-11-19 DIAGNOSIS — I21.3 ST ELEVATION MYOCARDIAL INFARCTION (STEMI), UNSPECIFIED ARTERY (HCC): Primary | ICD-10-CM

## 2021-11-19 DIAGNOSIS — Z95.5 H/O PLACEMENT OF STENT IN ANTERIOR DESCENDING BRANCH OF LEFT CORONARY ARTERY: ICD-10-CM

## 2021-11-19 DIAGNOSIS — I48.91 NEW ONSET A-FIB (HCC): ICD-10-CM

## 2021-11-19 DIAGNOSIS — I25.10 CORONARY ARTERY DISEASE INVOLVING NATIVE CORONARY ARTERY OF NATIVE HEART WITHOUT ANGINA PECTORIS: ICD-10-CM

## 2021-11-19 LAB
ALBUMIN SERPL-MCNC: 4.6 G/DL (ref 3.5–5.2)
ALBUMIN/GLOB SERPL: 1.9 G/DL
ALP SERPL-CCNC: 60 U/L (ref 39–117)
ALT SERPL W P-5'-P-CCNC: 19 U/L (ref 1–41)
ANION GAP SERPL CALCULATED.3IONS-SCNC: 7.5 MMOL/L (ref 5–15)
AST SERPL-CCNC: 20 U/L (ref 1–40)
BILIRUB SERPL-MCNC: 0.3 MG/DL (ref 0–1.2)
BUN SERPL-MCNC: 16 MG/DL (ref 8–23)
BUN/CREAT SERPL: 15.4 (ref 7–25)
CALCIUM SPEC-SCNC: 9.3 MG/DL (ref 8.6–10.5)
CHLORIDE SERPL-SCNC: 106 MMOL/L (ref 98–107)
CHOLEST SERPL-MCNC: 135 MG/DL (ref 0–200)
CO2 SERPL-SCNC: 29.5 MMOL/L (ref 22–29)
CREAT SERPL-MCNC: 1.04 MG/DL (ref 0.76–1.27)
GFR SERPL CREATININE-BSD FRML MDRD: 73 ML/MIN/1.73
GLOBULIN UR ELPH-MCNC: 2.4 GM/DL
GLUCOSE SERPL-MCNC: 98 MG/DL (ref 65–99)
HDLC SERPL-MCNC: 53 MG/DL (ref 40–60)
LDLC SERPL CALC-MCNC: 71 MG/DL (ref 0–100)
LDLC/HDLC SERPL: 1.36 {RATIO}
POTASSIUM SERPL-SCNC: 4.7 MMOL/L (ref 3.5–5.2)
PROT SERPL-MCNC: 7 G/DL (ref 6–8.5)
SODIUM SERPL-SCNC: 143 MMOL/L (ref 136–145)
TRIGL SERPL-MCNC: 50 MG/DL (ref 0–150)
TSH SERPL DL<=0.05 MIU/L-ACNC: 2.76 UIU/ML (ref 0.27–4.2)
VLDLC SERPL-MCNC: 11 MG/DL (ref 5–40)

## 2021-11-19 PROCEDURE — 36415 COLL VENOUS BLD VENIPUNCTURE: CPT

## 2021-11-19 PROCEDURE — 80061 LIPID PANEL: CPT

## 2021-11-19 PROCEDURE — 84443 ASSAY THYROID STIM HORMONE: CPT

## 2021-11-19 PROCEDURE — 93798 PHYS/QHP OP CAR RHAB W/ECG: CPT

## 2021-11-19 PROCEDURE — 80053 COMPREHEN METABOLIC PANEL: CPT

## 2021-11-19 NOTE — TELEPHONE ENCOUNTER
Notified patient of results. He verbalized understanding.    Treasure Denise, RN  Triage [facility]

## 2021-11-19 NOTE — TELEPHONE ENCOUNTER
----- Message from FRANCIA Maria sent at 11/19/2021  1:29 PM EST -----  Labs normal, Lipid panel looks great on statin therapy. No changes to regimen  ----- Message -----  From: Lab, Background User  Sent: 11/19/2021  10:57 AM EST  To: FRANCIA Maria

## 2021-11-22 ENCOUNTER — TREATMENT (OUTPATIENT)
Dept: CARDIAC REHAB | Facility: HOSPITAL | Age: 61
End: 2021-11-22

## 2021-11-22 DIAGNOSIS — I21.3 ST ELEVATION MYOCARDIAL INFARCTION (STEMI), UNSPECIFIED ARTERY (HCC): Primary | ICD-10-CM

## 2021-11-22 PROCEDURE — 93798 PHYS/QHP OP CAR RHAB W/ECG: CPT

## 2021-11-24 ENCOUNTER — APPOINTMENT (OUTPATIENT)
Dept: CARDIAC REHAB | Facility: HOSPITAL | Age: 61
End: 2021-11-24

## 2021-11-26 ENCOUNTER — APPOINTMENT (OUTPATIENT)
Dept: CARDIAC REHAB | Facility: HOSPITAL | Age: 61
End: 2021-11-26

## 2021-11-29 ENCOUNTER — APPOINTMENT (OUTPATIENT)
Dept: CARDIAC REHAB | Facility: HOSPITAL | Age: 61
End: 2021-11-29

## 2021-12-01 ENCOUNTER — APPOINTMENT (OUTPATIENT)
Dept: CARDIAC REHAB | Facility: HOSPITAL | Age: 61
End: 2021-12-01

## 2021-12-01 DIAGNOSIS — I48.91 NEW ONSET A-FIB (HCC): Primary | ICD-10-CM

## 2021-12-03 ENCOUNTER — APPOINTMENT (OUTPATIENT)
Dept: CARDIAC REHAB | Facility: HOSPITAL | Age: 61
End: 2021-12-03

## 2021-12-06 ENCOUNTER — APPOINTMENT (OUTPATIENT)
Dept: CARDIAC REHAB | Facility: HOSPITAL | Age: 61
End: 2021-12-06

## 2021-12-08 ENCOUNTER — APPOINTMENT (OUTPATIENT)
Dept: CARDIAC REHAB | Facility: HOSPITAL | Age: 61
End: 2021-12-08

## 2021-12-10 ENCOUNTER — APPOINTMENT (OUTPATIENT)
Dept: CARDIAC REHAB | Facility: HOSPITAL | Age: 61
End: 2021-12-10

## 2021-12-13 ENCOUNTER — APPOINTMENT (OUTPATIENT)
Dept: CARDIAC REHAB | Facility: HOSPITAL | Age: 61
End: 2021-12-13

## 2021-12-15 ENCOUNTER — APPOINTMENT (OUTPATIENT)
Dept: CARDIAC REHAB | Facility: HOSPITAL | Age: 61
End: 2021-12-15

## 2021-12-17 ENCOUNTER — APPOINTMENT (OUTPATIENT)
Dept: CARDIAC REHAB | Facility: HOSPITAL | Age: 61
End: 2021-12-17

## 2021-12-20 ENCOUNTER — APPOINTMENT (OUTPATIENT)
Dept: CARDIAC REHAB | Facility: HOSPITAL | Age: 61
End: 2021-12-20

## 2021-12-22 ENCOUNTER — APPOINTMENT (OUTPATIENT)
Dept: CARDIAC REHAB | Facility: HOSPITAL | Age: 61
End: 2021-12-22

## 2021-12-27 ENCOUNTER — APPOINTMENT (OUTPATIENT)
Dept: CARDIAC REHAB | Facility: HOSPITAL | Age: 61
End: 2021-12-27

## 2021-12-29 ENCOUNTER — APPOINTMENT (OUTPATIENT)
Dept: CARDIAC REHAB | Facility: HOSPITAL | Age: 61
End: 2021-12-29

## 2022-01-03 ENCOUNTER — APPOINTMENT (OUTPATIENT)
Dept: CARDIAC REHAB | Facility: HOSPITAL | Age: 62
End: 2022-01-03

## 2022-01-05 ENCOUNTER — APPOINTMENT (OUTPATIENT)
Dept: CARDIAC REHAB | Facility: HOSPITAL | Age: 62
End: 2022-01-05

## 2022-01-07 ENCOUNTER — TELEPHONE (OUTPATIENT)
Dept: CARDIOLOGY | Facility: CLINIC | Age: 62
End: 2022-01-07

## 2022-01-07 ENCOUNTER — APPOINTMENT (OUTPATIENT)
Dept: CARDIAC REHAB | Facility: HOSPITAL | Age: 62
End: 2022-01-07

## 2022-01-07 NOTE — TELEPHONE ENCOUNTER
"  Our office is closed today.  Patient contacted our answering service earlier this morning saying that he was \"out of medications\".  I have returned his call to times left voicemail to see what he is needing to have refilled.  I told him if he was still needing assistance, to call back to our answering service.  I looked at his medication list and it does not appear that anything needs refilled according to the computer.  "

## 2022-01-07 NOTE — TELEPHONE ENCOUNTER
Patient paged the service again.  When I returned his call, it goes straight to voicemail.  I left a voicemail stating this and asked him to answer the phone next time I call.

## 2022-01-10 ENCOUNTER — TELEPHONE (OUTPATIENT)
Dept: CARDIOLOGY | Facility: CLINIC | Age: 62
End: 2022-01-10

## 2022-01-10 RX ORDER — CLOPIDOGREL BISULFATE 75 MG/1
75 TABLET ORAL DAILY
Qty: 30 TABLET | Refills: 5 | Status: SHIPPED | OUTPATIENT
Start: 2022-01-10 | End: 2022-06-15 | Stop reason: SDUPTHER

## 2022-01-10 NOTE — TELEPHONE ENCOUNTER
The patient phoned needing a new prescription with refills for Clopidogrel 75 mg as soon as possible as he is out of meedication now..  Please send to his pharmacy at [facility].  Thank you

## 2022-03-29 RX ORDER — LISINOPRIL 2.5 MG/1
2.5 TABLET ORAL
Qty: 30 TABLET | Refills: 1 | Status: SHIPPED | OUTPATIENT
Start: 2022-03-29 | End: 2022-06-15 | Stop reason: SDUPTHER

## 2022-06-16 RX ORDER — LISINOPRIL 2.5 MG/1
2.5 TABLET ORAL
Qty: 30 TABLET | Refills: 1 | Status: SHIPPED | OUTPATIENT
Start: 2022-06-16 | End: 2022-08-24 | Stop reason: SDUPTHER

## 2022-06-16 RX ORDER — CLOPIDOGREL BISULFATE 75 MG/1
75 TABLET ORAL DAILY
Qty: 30 TABLET | Refills: 1 | Status: SHIPPED | OUTPATIENT
Start: 2022-06-16 | End: 2022-07-22 | Stop reason: SDUPTHER

## 2022-06-16 RX ORDER — CARVEDILOL 3.12 MG/1
3.12 TABLET ORAL 2 TIMES DAILY
Qty: 180 TABLET | Refills: 0 | Status: SHIPPED | OUTPATIENT
Start: 2022-06-16 | End: 2022-07-22 | Stop reason: SDUPTHER

## 2022-07-22 ENCOUNTER — OFFICE VISIT (OUTPATIENT)
Dept: CARDIOLOGY | Facility: CLINIC | Age: 62
End: 2022-07-22

## 2022-07-22 VITALS
DIASTOLIC BLOOD PRESSURE: 82 MMHG | SYSTOLIC BLOOD PRESSURE: 112 MMHG | HEART RATE: 56 BPM | WEIGHT: 179 LBS | BODY MASS INDEX: 24.24 KG/M2 | HEIGHT: 72 IN

## 2022-07-22 DIAGNOSIS — I25.10 CORONARY ARTERY DISEASE INVOLVING NATIVE CORONARY ARTERY OF NATIVE HEART WITHOUT ANGINA PECTORIS: Primary | ICD-10-CM

## 2022-07-22 DIAGNOSIS — Z95.5 H/O PLACEMENT OF STENT IN ANTERIOR DESCENDING BRANCH OF LEFT CORONARY ARTERY: ICD-10-CM

## 2022-07-22 DIAGNOSIS — I42.8 NON-ISCHEMIC CARDIOMYOPATHY: ICD-10-CM

## 2022-07-22 PROCEDURE — 99214 OFFICE O/P EST MOD 30 MIN: CPT | Performed by: INTERNAL MEDICINE

## 2022-07-22 PROCEDURE — 93000 ELECTROCARDIOGRAM COMPLETE: CPT | Performed by: INTERNAL MEDICINE

## 2022-07-22 RX ORDER — ATORVASTATIN CALCIUM 40 MG/1
40 TABLET, FILM COATED ORAL NIGHTLY
Qty: 90 TABLET | Refills: 3 | Status: SHIPPED | OUTPATIENT
Start: 2022-07-22

## 2022-07-22 RX ORDER — ATORVASTATIN CALCIUM 40 MG/1
40 TABLET, FILM COATED ORAL NIGHTLY
Qty: 90 TABLET | Refills: 3 | Status: SHIPPED | OUTPATIENT
Start: 2022-07-22 | End: 2022-07-22 | Stop reason: SDUPTHER

## 2022-07-22 RX ORDER — CLOPIDOGREL BISULFATE 75 MG/1
75 TABLET ORAL DAILY
Qty: 90 TABLET | Refills: 3 | Status: SHIPPED | OUTPATIENT
Start: 2022-07-22

## 2022-07-22 RX ORDER — CARVEDILOL 3.12 MG/1
3.12 TABLET ORAL 2 TIMES DAILY
Qty: 180 TABLET | Refills: 3 | Status: SHIPPED | OUTPATIENT
Start: 2022-07-22

## 2022-07-22 NOTE — PROGRESS NOTES
Date of Office Visit: 2022  Encounter Provider: Yamilex Avila MD  Place of Service: [facility] CARDIOLOGY  Patient Name: Brayden Rainey  :1960      Patient ID:  Brayden Rainey is a 61 y.o. male is here for  followup for CAD, h/o MI        History of Present Illness    He was admitted -2021 with chest pain.  He was diagnosed on arrival to [facility] with an acute ST elevation myocardial infarction involving the lateral wall.  Prior to this, he had no history of coronary artery disease but did have a history of anxiety and posttraumatic stress disorder.  His cardiac catheterization on 2021 showed normal left main coronary artery, 80-90% proximal LAD stenosis, normal circumflex, 80% mid RCA stenosis.  He received a 2.5 x 23 mm drug-eluting stent to the LAD.  A small diagonal branch was 100% occluded.  Echocardiogram done 2021 showed ejection fraction 55% with akinesis of the distal anterior wall and apex as well as distal septum with normal diastolic function.  He had normal bilateral lower extremity venous duplex studies.  He had atrial fibrillation after his myocardial infarction and was treated with amiodarone and Xarelto which have been stopped.  No other atrial fibrillation has been noted.    Repeat cardiac catheterization done 2021 shows normal left main, patent LAD stents, first diagonal longer disease but small and not amenable to PCI, normal circumflex, dominant RCA with 90% proximal RCA stenosis.  He received a 3.0 x 38 mm drug-eluting stent in the distal to mid RCA and a 3.0 x 23 mm Xience drug-eluting stent to the mid to proximal RCA.     Labs on 2021 showed normal CMP, normal TSH, total cholesterol 135, HDL 53, LDL 71, VLDL 11, triglycerides 50.    He has no chest pain or pressure.  He does not feels heart racing or skipping.  He has no orthopnea or PND.  He stays very busy.  He has no exertional dyspnea.  He is taking his  medications as directed without difficulty.  He has no nausea, vomiting or diarrhea.    Past Medical History:   Diagnosis Date   • Anxiety 7/4/2021   • Atrial fibrillation (HCC)    • Coronary artery disease    • Hyperlipidemia    • PTSD (post-traumatic stress disorder) 7/4/2021         Past Surgical History:   Procedure Laterality Date   • CARDIAC CATHETERIZATION N/A 7/4/2021    Procedure: Left Heart Cath;  Surgeon: Jericho Lai MD;  Location:  [facility] CATH INVASIVE LOCATION;  Service: Cardiology;  Laterality: N/A;   • CARDIAC CATHETERIZATION N/A 7/4/2021    Procedure: Percutaneous Coronary Intervention;  Surgeon: Jericho Lai MD;  Location:  [facility] CATH INVASIVE LOCATION;  Service: Cardiology;  Laterality: N/A;   • CARDIAC CATHETERIZATION N/A 7/4/2021    Procedure: Stent MONA coronary;  Surgeon: Jericho Lai MD;  Location:  [facility] CATH INVASIVE LOCATION;  Service: Cardiology;  Laterality: N/A;   • CARDIAC CATHETERIZATION N/A 8/19/2021    Procedure: STENT MONA - CORONARY;  Surgeon: Fer Henry MD;  [facility] C[facility];  Service: Cardiovascular;  Laterality: N/A;   • CARDIAC CATHETERIZATION N/A 8/19/2021    Procedure: Left Heart Cath;  Surgeon: Fer Henry MD;  Location:  [facility] CATH INVASIVE LOCATION;  Service: Cardiovascular;  Laterality: N/A;   • CARDIAC CATHETERIZATION N/A 8/19/2021    Procedure: Coronary angiography;  Surgeon: Fer Henry MD;  [facility] C[facility];  Service: Cardiovascular;  Laterality: N/A;   • CARDIAC CATHETERIZATION N/A 8/19/2021    Procedure: Optical Coherent Tomography;  Surgeon: Fer Henry MD;  [facility] C[facility];  Service: Cardiovascular;  Laterality: N/A;   • CORONARY ANGIOPLASTY     • HERNIA REPAIR     • LIPOMA EXCISION     • SEPTOPLASTY         Current Outpatient Medications on File Prior to Visit   Medication Sig Dispense Refill   • atorvastatin (LIPITOR) 40 MG tablet Take 1 tablet by mouth Every Night. 90  "tablet 3   • carvedilol (COREG) 3.125 MG tablet Take 1 tablet by mouth 2 (Two) Times a Day. 180 tablet 0   • clopidogrel (PLAVIX) 75 MG tablet Take 1 tablet by mouth Daily. 30 tablet 1   • escitalopram (LEXAPRO) 20 MG tablet Take 1 tablet by mouth Daily. 90 tablet 3   • lisinopril (PRINIVIL,ZESTRIL) 2.5 MG tablet Take 1 tablet by mouth Daily. 30 tablet 1   • multivitamin with minerals tablet tablet Take 1 tablet by mouth Daily.     • [DISCONTINUED] rivaroxaban (Xarelto) 20 MG tablet Take 1 tablet by mouth Daily. 30 tablet 11     No current facility-administered medications on file prior to visit.       Social History     Socioeconomic History   • Marital status: Single   Tobacco Use   • Smoking status: Former Smoker   • Smokeless tobacco: Never Used   • Tobacco comment: decaff or half caff   Vaping Use   • Vaping Use: Never used   Substance and Sexual Activity   • Alcohol use: Yes     Alcohol/week: 1.0 standard drink     Types: 1 Glasses of wine per week     Comment: occasional   • Drug use: Never   • Sexual activity: Defer           ROS    Procedures    ECG 12 Lead    Date/Time: 7/22/2022 2:44 PM  Performed by: Yamilex Avila MD  Authorized by: Yamilex Avila MD   Comparison: compared with previous ECG   Similar to previous ECG  Rhythm: sinus rhythm  T inversion: aVL, I and V2    Clinical impression: abnormal EKG                Objective:      Vitals:    07/22/22 1427   BP: 112/82   Pulse: 56   Weight: 81.2 kg (179 lb)   Height: 182.9 cm (72\")     Body mass index is 24.28 kg/m².    Vitals reviewed.   Constitutional:       General: Not in acute distress.     Appearance: Well-developed. Not diaphoretic.   Eyes:      General: No scleral icterus.     Conjunctiva/sclera: Conjunctivae normal.   HENT:      Head: Normocephalic and atraumatic.   Neck:      Thyroid: No thyromegaly.      Vascular: No carotid bruit or JVD.      Lymphadenopathy: No cervical adenopathy.   Pulmonary:      Effort: Pulmonary effort is " normal. No respiratory distress.      Breath sounds: Normal breath sounds. No wheezing. No rhonchi. No rales.   Chest:      Chest wall: Not tender to palpatation.   Cardiovascular:      Normal rate. Regular rhythm.      Murmurs: There is no murmur.      No gallop.   Pulses:     Intact distal pulses.   Edema:     Peripheral edema absent.   Abdominal:      General: Bowel sounds are normal. There is no distension or abdominal bruit.      Palpations: Abdomen is soft. There is no abdominal mass.      Tenderness: There is no abdominal tenderness.   Musculoskeletal:         General: No deformity.      Extremities: No clubbing present.     Cervical back: Neck supple. Skin:     General: Skin is warm and dry. There is no cyanosis.      Coloration: Skin is not pale.      Findings: No rash.   Neurological:      Mental Status: Alert and oriented to person, place, and time.      Cranial Nerves: No cranial nerve deficit.   Psychiatric:         Judgment: Judgment normal.         Lab Review:       Assessment:      Diagnosis Plan   1. Coronary artery disease involving native coronary artery of native heart without angina pectoris  Lipid Panel    Magnesium    D-dimer, Quantitative    CBC & Differential    TSH    Uric Acid   2. H/O placement of stent in anterior descending branch of left coronary artery  Lipid Panel    Magnesium    D-dimer, Quantitative    CBC & Differential    TSH    Uric Acid   3. Non-ischemic cardiomyopathy (HCC)         1. CAD, status post LAD stent in response to an acute ST elevation myocardial infarction, residual anterior septal and apical hypokinesis 7/2021, status post RCA stent done 8/2021.  2. Hypertension, well treated  3. Atrial fibrillation in the hospital after myocardial infarction 7/2021, no other atrial fibrillation noted, amiodarone and Xarelto been discontinued.  Hyperlipidemia, on atorvastatin.     Plan:       See Romana in 6 months, no medication changes, overall doing well.  Check labs.

## 2022-08-24 RX ORDER — LISINOPRIL 2.5 MG/1
2.5 TABLET ORAL
Qty: 30 TABLET | Refills: 1 | Status: SHIPPED | OUTPATIENT
Start: 2022-08-24 | End: 2022-11-13 | Stop reason: SDUPTHER

## 2022-11-14 RX ORDER — LISINOPRIL 2.5 MG/1
2.5 TABLET ORAL
Qty: 30 TABLET | Refills: 1 | Status: SHIPPED | OUTPATIENT
Start: 2022-11-14 | End: 2023-02-02 | Stop reason: SDUPTHER

## 2022-11-14 NOTE — TELEPHONE ENCOUNTER
Failed protocol    Last OV-07/22/22-RM  Next OV-01/27/23-MM    Plan:       See Romana in 6 months, no medication changes, overall doing well.  Check labs.

## 2023-02-02 RX ORDER — LISINOPRIL 2.5 MG/1
2.5 TABLET ORAL
Qty: 30 TABLET | Refills: 1 | Status: SHIPPED | OUTPATIENT
Start: 2023-02-02 | End: 2023-04-03 | Stop reason: SDUPTHER

## 2023-02-03 ENCOUNTER — OFFICE VISIT (OUTPATIENT)
Dept: CARDIOLOGY | Facility: CLINIC | Age: 63
End: 2023-02-03
Payer: COMMERCIAL

## 2023-02-03 VITALS
WEIGHT: 187 LBS | HEIGHT: 72 IN | DIASTOLIC BLOOD PRESSURE: 76 MMHG | HEART RATE: 50 BPM | SYSTOLIC BLOOD PRESSURE: 132 MMHG | BODY MASS INDEX: 25.33 KG/M2

## 2023-02-03 DIAGNOSIS — E78.2 MIXED HYPERLIPIDEMIA: ICD-10-CM

## 2023-02-03 DIAGNOSIS — I21.29: Primary | ICD-10-CM

## 2023-02-03 DIAGNOSIS — I25.10 CORONARY ARTERY DISEASE INVOLVING NATIVE CORONARY ARTERY OF NATIVE HEART WITHOUT ANGINA PECTORIS: ICD-10-CM

## 2023-02-03 DIAGNOSIS — F43.10 PTSD (POST-TRAUMATIC STRESS DISORDER): ICD-10-CM

## 2023-02-03 DIAGNOSIS — I25.5 ISCHEMIC CARDIOMYOPATHY: ICD-10-CM

## 2023-02-03 DIAGNOSIS — F41.9 ANXIETY: ICD-10-CM

## 2023-02-03 PROCEDURE — 99214 OFFICE O/P EST MOD 30 MIN: CPT | Performed by: NURSE PRACTITIONER

## 2023-02-03 PROCEDURE — 93000 ELECTROCARDIOGRAM COMPLETE: CPT | Performed by: NURSE PRACTITIONER

## 2023-02-03 NOTE — PROGRESS NOTES
"      [facility]  [address]  [address]  [place] [address]  Phone: [phone]  Fax: [phone]  Patient Name: Brayden Rainey  :1960  Age: 62 y.o.  Primary Cardiologist: Yamilex Avila MD  Encounter Provider:  FRANCIA Singh    History of Present Illness     Brayden Rainey is a 62 y.o.  male whose medical history includes anxiety and posttraumatic stress disorder.  He is followed in our office by Dr. Avila for coronary artery disease, nonischemic cardiomyopathy, paroxysmal atrial fibrillation. I have reviewed the past medical records in preparation of today's visit.     23 Follow-up:  He is here for 6-month follow-up and I am seeing him for the first time today.  He feels good.  He did have COVID-19 about 6 weeks ago and still has some lingering upper respiratory issues but these are slowly improving.  He did not require hospitalization.  He does not check his blood pressure at home but states this reading is typical for him.  He denies chest pain, dyspnea with exertion, orthopnea, palpitations, lightheadedness, or leg swelling.  He is taking his medications as prescribed.  He does not have a primary care physician.  He does describe a left inguinal hernia that is occasionally bothersome.    Data Review     The following data was reviewed by FRANCIA Singh on 23:    Vital Signs:   /76   Pulse 50   Ht 182.9 cm (72\")   Wt 84.8 kg (187 lb)   BMI 25.36 kg/m²       Weight:  Wt Readings from Last 3 Encounters:   23 84.8 kg (187 lb)   22 83.9 kg (185 lb)   22 81.2 kg (179 lb)     Body mass index is 25.36 kg/m².    Below is a summary of pertinent cardiology findings:  • 2021 he presented to [facility] with acute STEMI involving the lateral wall.  Cardiac catheterization showed normal left main coronary artery, 80 to 90% proximal LAD stenosis, normal left circumflex, " 80% mid RCA stenosis, and a small diagonal branch was 100% occluded..  He received a 2.5 x 23 mm drug-eluting stent to the LAD.  • July 2021 echocardiogram showed EF 55% with akinesis of the distal anterior wall and apex as well as the distal septum; he had normal diastolic function.  Bilateral lower extremity Doppler studies were normal.    • July 2021 he did had atrial fibrillation after MI and was treated with amiodarone and Xarelto; these have been stopped and he has had no further episodes.  • August 2021 a repeat cardiac catheterization showed normal left main coronary artery, patent LAD stents, first diagonal occlusive disease but small not amenable to PCI, normal left circumflex, a dominant RCA with 90% proximal RCA stenosis; he received a 3.0 x 38 mm drug-eluting stent to the distal to mid RCA and a 3.0 x 23 mm Xience drug-eluting stent to the mid to proximal RCA.    Labs:  • 11/19/2021:  cr 1.0, K 4.7, otherwise unremarkable CMP, TSH 2.760, Chol 135, HDL 53, LDL 71, Trig 50      ECG 12 Lead    Date/Time: 2/3/2023 10:56 AM  Performed by: Lindsay Villanueva APRN  Authorized by: Lindsay Villanueva APRN   Comparison: compared with previous ECG from 7/22/2022  Similar to previous ECG  Rhythm: sinus rhythm  Rate: normal  BPM: 50  T inversion: aVL and V2    Clinical impression: abnormal EKG            Medications     Allergies as of 02/03/2023   • (No Known Allergies)         Current Outpatient Medications:   •  atorvastatin (LIPITOR) 40 MG tablet, Take 1 tablet by mouth Every Night., Disp: 90 tablet, Rfl: 3  •  carvedilol (COREG) 3.125 MG tablet, Take 1 tablet by mouth 2 (Two) Times a Day., Disp: 180 tablet, Rfl: 3  •  clopidogrel (PLAVIX) 75 MG tablet, Take 1 tablet by mouth Daily., Disp: 90 tablet, Rfl: 3  •  escitalopram (LEXAPRO) 20 MG tablet, Take 1 tablet by mouth Daily., Disp: 90 tablet, Rfl: 3  •  lisinopril (PRINIVIL,ZESTRIL) 2.5 MG tablet, Take 1 tablet by mouth Daily., Disp: 30  tablet, Rfl: 1  •  multivitamin with minerals tablet tablet, Take 1 tablet by mouth Daily., Disp: , Rfl:      Past History, Review of Systems, Exam     Past Medical History:   Diagnosis Date   • Anxiety 7/4/2021   • Atrial fibrillation (HCC)    • Coronary artery disease    • Hyperlipidemia    • PTSD (post-traumatic stress disorder) 7/4/2021       Past Surgical History:   has a past surgical history that includes Cardiac catheterization (N/A, 7/4/2021); Cardiac catheterization (N/A, 7/4/2021); Cardiac catheterization (N/A, 7/4/2021); Hernia repair; Lipoma Excision; Coronary angioplasty; Septoplasty; Cardiac catheterization (N/A, 8/19/2021); Cardiac catheterization (N/A, 8/19/2021); Cardiac catheterization (N/A, 8/19/2021); and Cardiac catheterization (N/A, 8/19/2021).     Social History     Socioeconomic History   • Marital status: Single   Tobacco Use   • Smoking status: Former   • Smokeless tobacco: Never   • Tobacco comments:     decaff or half caff   Vaping Use   • Vaping Use: Never used   Substance and Sexual Activity   • Alcohol use: Yes     Alcohol/week: 1.0 standard drink     Types: 1 Glasses of wine per week     Comment: occasional   • Drug use: Never   • Sexual activity: Defer       Review of Systems   Cardiovascular: Negative.    Gastrointestinal: Positive for abdominal pain.       Vitals reviewed.   Constitutional:       Appearance: Not in distress.   Eyes:      Conjunctiva/sclera: Conjunctivae normal.      Pupils: Pupils are equal, round, and reactive to light.   HENT:      Head: Normocephalic.      Nose: Nose normal.    Mouth/Throat:      Pharynx: Oropharynx is clear.   Neck:      Vascular: JVD normal.   Pulmonary:      Effort: Pulmonary effort is normal.      Breath sounds: Normal breath sounds. No wheezing. No rhonchi. No rales.   Cardiovascular:      Normal rate. Regular rhythm. Normal S1. Normal S2.      Murmurs: There is no murmur.   Pulses:     Intact distal pulses.   Edema:     Peripheral edema  absent.   Abdominal:      General: Bowel sounds are normal. There is no distension.      Palpations: Abdomen is soft.      Tenderness: There is no abdominal tenderness.   Musculoskeletal: Normal range of motion.      Cervical back: Normal range of motion and neck supple. Skin:     General: Skin is warm and dry.   Neurological:      Mental Status: Alert and oriented to person, place and time.   Psychiatric:         Attention and Perception: Attention normal.         Mood and Affect: Mood normal.         Speech: Speech normal.         Behavior: Behavior is cooperative.          Assessment and Plan     Assessment:  1. Acute lateral myocardial infarction (HCC)    2. Coronary artery disease involving native coronary artery of native heart without angina pectoris    3. Mixed hyperlipidemia    4. Anxiety    5. PTSD (post-traumatic stress disorder)    6. Ischemic cardiomyopathy         1. Acute lateral wall MI: This occurred in July 2021 and he had cardiac catheterization at [facility] receiving a stent to the LAD.  He then had cardiac catheterization in August 2021 and had 2 stents to the RCA.  He is chest pain-free at this time.  His medical therapy includes 40 mg atorvastatin daily, 75 mg Plavix daily, 3.125 mg carvedilol twice daily.  2. Coronary artery disease: As noted above with disease to the LAD and RCA which have been stented.  He also has 100% occluded small diagonal branch.  3. Cardiomyopathy: July 2021 echocardiogram showed EF 55% but with akinesis of the distal anterior wall, the apex, as well as the distal septum.  His medical therapy includes 3.125 mg carvedilol and 2.5 mg lisinopril daily.  4. Hyperlipidemia: Lipids were at goal when checked in November 2021.  He has not had any recent labs since then he is treated with 40 mg atorvastatin daily.  5. Anxiety/PTSD.  He is currently on 20 mg Lexapro daily.  6. Right inguinal hernia: Currently not causing him any issue but he has no primary care  provider.    Mr. Rainey is a patient of Dr. Avila's with history of coronary artery disease and acute lateral wall MI in July 2021; he has had stents to the LAD and RCA.  Echocardiogram at that time showed EF 55% with akinesis of the distal anterior wall, the apex, and the distal septum.  His medical therapy includes carvedilol, lisinopril, atorvastatin, and Plavix.  I have ordered labs to check his cholesterol and renal function.  I recommend that he get a primary care physician so someone can follow his hernia; I will put referral in today.  He was worried about healthcare costs and is cautious with this.  I will have him see Dr. Avila in 6 months and will consider echocardiogram at that time.    Return in about 6 months (around 8/3/2023) for Follow-up with Dr. Avila.  Orders Placed This Encounter   Procedures   • Comprehensive Metabolic Panel   • Magnesium   • TSH   • Lipid Panel   • Ambulatory Referral to Internal Medicine   • ECG 12 Lead   • CBC & Differential      No orders of the defined types were placed in this encounter.        Thank you for the opportunity to participate in this patient's care.    FRANCIA Josue    This office note has been dictated.

## 2023-02-06 ENCOUNTER — OFFICE VISIT (OUTPATIENT)
Dept: FAMILY MEDICINE CLINIC | Facility: CLINIC | Age: 63
End: 2023-02-06
Payer: COMMERCIAL

## 2023-02-06 VITALS
OXYGEN SATURATION: 96 % | HEART RATE: 77 BPM | RESPIRATION RATE: 16 BRPM | SYSTOLIC BLOOD PRESSURE: 138 MMHG | DIASTOLIC BLOOD PRESSURE: 94 MMHG | TEMPERATURE: 98.3 F

## 2023-02-06 DIAGNOSIS — Z95.5 H/O PLACEMENT OF STENT IN ANTERIOR DESCENDING BRANCH OF LEFT CORONARY ARTERY: ICD-10-CM

## 2023-02-06 DIAGNOSIS — F41.9 ANXIETY: ICD-10-CM

## 2023-02-06 DIAGNOSIS — Z00.00 ANNUAL PHYSICAL EXAM: ICD-10-CM

## 2023-02-06 DIAGNOSIS — Z00.00 ENCOUNTER FOR PREVENTIVE CARE: ICD-10-CM

## 2023-02-06 DIAGNOSIS — J20.9 ACUTE BRONCHITIS, UNSPECIFIED ORGANISM: Primary | ICD-10-CM

## 2023-02-06 DIAGNOSIS — E78.2 MIXED HYPERLIPIDEMIA: ICD-10-CM

## 2023-02-06 DIAGNOSIS — I25.10 CORONARY ARTERY DISEASE INVOLVING NATIVE CORONARY ARTERY OF NATIVE HEART WITHOUT ANGINA PECTORIS: ICD-10-CM

## 2023-02-06 DIAGNOSIS — K40.90 LEFT INGUINAL HERNIA: ICD-10-CM

## 2023-02-06 DIAGNOSIS — R05.9 COUGH, UNSPECIFIED TYPE: ICD-10-CM

## 2023-02-06 LAB
EXPIRATION DATE: NORMAL
FLUAV AG UPPER RESP QL IA.RAPID: NOT DETECTED
FLUBV AG UPPER RESP QL IA.RAPID: NOT DETECTED
INTERNAL CONTROL: NORMAL
Lab: NORMAL
SARS-COV-2 AG UPPER RESP QL IA.RAPID: NOT DETECTED

## 2023-02-06 PROCEDURE — 87428 SARSCOV & INF VIR A&B AG IA: CPT | Performed by: STUDENT IN AN ORGANIZED HEALTH CARE EDUCATION/TRAINING PROGRAM

## 2023-02-06 PROCEDURE — 99213 OFFICE O/P EST LOW 20 MIN: CPT | Performed by: STUDENT IN AN ORGANIZED HEALTH CARE EDUCATION/TRAINING PROGRAM

## 2023-02-06 PROCEDURE — 99396 PREV VISIT EST AGE 40-64: CPT | Performed by: STUDENT IN AN ORGANIZED HEALTH CARE EDUCATION/TRAINING PROGRAM

## 2023-02-06 RX ORDER — AZITHROMYCIN 250 MG/1
TABLET, FILM COATED ORAL
Qty: 6 TABLET | Refills: 0 | Status: SHIPPED | OUTPATIENT
Start: 2023-02-06 | End: 2023-03-16

## 2023-02-06 NOTE — PROGRESS NOTES
Chief Complaint  Pt presented to clinic with   Chief Complaint   Patient presents with   • Cough     Green mucus, nasal drainage           History of Present Illness  Mr. Cardenas 62-year-old pleasant male who presented to the clinic for the first time for establishing medical care, annual physical, with chief complaint of having worsening nasal drainage, sore throat, body aches.  Patient stated he had COVID infection 6 weeks ago and he was thinking that his symptoms are improving but suddenly he noticed his runny nose got worse and he noticed green mucus discharge coming out of his nose.  Yesterday he felt so bad, had body aches, denies having any fever or shortness of breath.  Review of Systems   Constitutional: Positive for chills and fatigue. Negative for appetite change and unexpected weight change.   HENT: Positive for congestion, rhinorrhea and sore throat. Negative for trouble swallowing and voice change.    Respiratory: Negative for chest tightness, shortness of breath and wheezing.    Cardiovascular: Negative for chest pain and palpitations.   Gastrointestinal: Negative for abdominal distention, abdominal pain, diarrhea, nausea and vomiting.   Genitourinary: Negative for decreased urine volume and flank pain.   Musculoskeletal: Negative for arthralgias and myalgias.   Skin: Negative for wound.   Neurological: Negative for tremors, weakness and headaches.   Hematological: Negative for adenopathy.   Psychiatric/Behavioral: Negative for dysphoric mood and sleep disturbance.       PMH:   Outpatient Medications Prior to Visit   Medication Sig Dispense Refill   • atorvastatin (LIPITOR) 40 MG tablet Take 1 tablet by mouth Every Night. 90 tablet 3   • carvedilol (COREG) 3.125 MG tablet Take 1 tablet by mouth 2 (Two) Times a Day. 180 tablet 3   • clopidogrel (PLAVIX) 75 MG tablet Take 1 tablet by mouth Daily. 90 tablet 3   • escitalopram (LEXAPRO) 20 MG tablet Take 1 tablet by mouth Daily. 90 tablet 3   • lisinopril  (PRINIVIL,ZESTRIL) 2.5 MG tablet Take 1 tablet by mouth Daily. 30 tablet 1   • multivitamin with minerals tablet tablet Take 1 tablet by mouth Daily.       No facility-administered medications prior to visit.      No Known Allergies  Past Surgical History:   Procedure Laterality Date   • CARDIAC CATHETERIZATION N/A 7/4/2021    Procedure: Left Heart Cath;  Surgeon: Jericho Lai MD;  Location: [facility] CATH INVASIVE LOCATION;  Service: Cardiology;  Laterality: N/A;   • CARDIAC CATHETERIZATION N/A 7/4/2021    Procedure: Percutaneous Coronary Intervention;  Surgeon: Jericho Lai MD;  Location: [facility] CATH INVASIVE LOCATION;  Service: Cardiology;  Laterality: N/A;   • CARDIAC CATHETERIZATION N/A 7/4/2021    Procedure: Stent MONA coronary;  Surgeon: Jericho Lai MD;  Location: [facility] CATH INVASIVE LOCATION;  Service: Cardiology;  Laterality: N/A;   • CARDIAC CATHETERIZATION N/A 8/19/2021    Procedure: STENT MONA - CORONARY;  Surgeon: Fer Henry MD;  Location: [facility] CATH INVASIVE LOCATION;  Service: Cardiovascular;  Laterality: N/A;   • CARDIAC CATHETERIZATION N/A 8/19/2021    Procedure: Left Heart Cath;  Surgeon: Fer Henry MD;  [facility];  Service: Cardiovascular;  Laterality: N/A;   • CARDIAC CATHETERIZATION N/A 8/19/2021    Procedure: Coronary angiography;  Surgeon: Fer Henry MD;  Location: [facility] CATH INVASIVE LOCATION;  Service: Cardiovascular;  Laterality: N/A;   • CARDIAC CATHETERIZATION N/A 8/19/2021    Procedure: Optical Coherent Tomography;  Surgeon: Fer Henry MD;  Location: [facility] CATH INVASIVE LOCATION;  Service: Cardiovascular;  Laterality: N/A;   • CORONARY ANGIOPLASTY     • HERNIA REPAIR     • LIPOMA EXCISION     • SEPTOPLASTY       family history includes Heart attack in his father; Heart disease in his father.   reports that he has quit smoking. He has never used smokeless tobacco. He reports current alcohol use of about 1.0 standard drink per week.  He reports that he does not use drugs.     /94   Pulse 77   Temp 98.3 °F (36.8 °C)   Resp 16   SpO2 96%   Physical Exam  Constitutional:       Appearance: Normal appearance.   HENT:      Right Ear: There is impacted cerumen.      Left Ear: There is impacted cerumen.      Nose: Nose normal.      Mouth/Throat:      Mouth: Mucous membranes are moist.      Pharynx: Oropharynx is clear.   Eyes:      Extraocular Movements: Extraocular movements intact.      Conjunctiva/sclera: Conjunctivae normal.      Pupils: Pupils are equal, round, and reactive to light.   Cardiovascular:      Rate and Rhythm: Normal rate.      Pulses: Normal pulses.      Heart sounds: Normal heart sounds.   Pulmonary:      Effort: Pulmonary effort is normal.      Breath sounds: Normal breath sounds.   Abdominal:      General: Abdomen is flat. Bowel sounds are normal.      Palpations: Abdomen is soft.      Hernia: A hernia is present.   Musculoskeletal:         General: Normal range of motion.      Cervical back: Normal range of motion.   Skin:     General: Skin is warm.   Neurological:      General: No focal deficit present.      Mental Status: He is alert and oriented to person, place, and time.   Psychiatric:         Mood and Affect: Mood normal.          Diagnoses and all orders for this visit:    1. Acute bronchitis, unspecified organism (Primary)  Comments:  Advised patient to take Z-Nicho, RTC or ER if symptoms worsen  Orders:  -     azithromycin (Zithromax Z-Nicho) 250 MG tablet; Take 2 tablets by mouth on day 1, then 1 tablet daily on days 2-5  Dispense: 6 tablet; Refill: 0    2. Cough, unspecified type  -     POCT SARS-CoV-2 Antigen ALYSE + Flu    3. Annual physical exam  Comments:  Patient stated he is supposed to do labs for the cardiologist and orders are in already    4. Encounter for preventive care  Comments:  As per the patient colonoscopy was done in 2019 and he was told to repeat in 5 years, patient will be due in 2024,  encouraged to get COVID-vaccine, patient declined for now.  Advised patient to get shingles vaccine from the pharmacy.    5. Coronary artery disease involving native coronary artery of native heart without angina pectoris  Comments:  Is on Plavix, Coreg 3.125 mg 2 times daily, Lipitor 40 mg, lisinopril 2.5 mg p.o. daily    6. Mixed hyperlipidemia  Comments:  On Lipitor 40 mg p.o. nightly    7. Anxiety  Comments:  Lexapro 20 mg p.o. daily    8. H/O placement of stent in anterior descending branch of left coronary artery    9. Left inguinal hernia  Comments:  History of right inguinal hernia repairs many years ago, stated he has high deductibles and has to pay $1700 before going for surgery, will think about it    Patient does not need any referral from me as his concern  is a surgeon and he would like to follow-up with him.    Needs yearly Flu vaccine  Dental visit and exam every year  Seat Belt always when driving or riding cars  Safe sex life to avoid STD  Healthy heart diet  Exercise 3 times a week    Follow Up  1 year

## 2023-03-03 ENCOUNTER — TELEPHONE (OUTPATIENT)
Dept: CARDIOLOGY | Facility: CLINIC | Age: 63
End: 2023-03-03
Payer: COMMERCIAL

## 2023-03-03 ENCOUNTER — LAB (OUTPATIENT)
Dept: LAB | Facility: HOSPITAL | Age: 63
End: 2023-03-03
Payer: COMMERCIAL

## 2023-03-03 DIAGNOSIS — I25.10 CORONARY ARTERY DISEASE INVOLVING NATIVE CORONARY ARTERY OF NATIVE HEART WITHOUT ANGINA PECTORIS: ICD-10-CM

## 2023-03-03 DIAGNOSIS — E78.2 MIXED HYPERLIPIDEMIA: ICD-10-CM

## 2023-03-03 DIAGNOSIS — Z95.5 H/O PLACEMENT OF STENT IN ANTERIOR DESCENDING BRANCH OF LEFT CORONARY ARTERY: ICD-10-CM

## 2023-03-03 LAB
ALBUMIN SERPL-MCNC: 4.4 G/DL (ref 3.5–5.2)
ALBUMIN/GLOB SERPL: 1.6 G/DL
ALP SERPL-CCNC: 60 U/L (ref 39–117)
ALT SERPL W P-5'-P-CCNC: 14 U/L (ref 1–41)
ANION GAP SERPL CALCULATED.3IONS-SCNC: 6.8 MMOL/L (ref 5–15)
AST SERPL-CCNC: 18 U/L (ref 1–40)
BASOPHILS # BLD AUTO: 0.04 10*3/MM3 (ref 0–0.2)
BASOPHILS NFR BLD AUTO: 0.9 % (ref 0–1.5)
BILIRUB SERPL-MCNC: 0.6 MG/DL (ref 0–1.2)
BUN SERPL-MCNC: 11 MG/DL (ref 8–23)
BUN/CREAT SERPL: 12.6 (ref 7–25)
CALCIUM SPEC-SCNC: 9.4 MG/DL (ref 8.6–10.5)
CHLORIDE SERPL-SCNC: 104 MMOL/L (ref 98–107)
CHOLEST SERPL-MCNC: 150 MG/DL (ref 0–200)
CO2 SERPL-SCNC: 31.2 MMOL/L (ref 22–29)
CREAT SERPL-MCNC: 0.87 MG/DL (ref 0.76–1.27)
DEPRECATED RDW RBC AUTO: 41.8 FL (ref 37–54)
EGFRCR SERPLBLD CKD-EPI 2021: 98.2 ML/MIN/1.73
EOSINOPHIL # BLD AUTO: 0.14 10*3/MM3 (ref 0–0.4)
EOSINOPHIL NFR BLD AUTO: 3.3 % (ref 0.3–6.2)
ERYTHROCYTE [DISTWIDTH] IN BLOOD BY AUTOMATED COUNT: 12.1 % (ref 12.3–15.4)
GLOBULIN UR ELPH-MCNC: 2.7 GM/DL
GLUCOSE SERPL-MCNC: 100 MG/DL (ref 65–99)
HCT VFR BLD AUTO: 40 % (ref 37.5–51)
HDLC SERPL-MCNC: 62 MG/DL (ref 40–60)
HGB BLD-MCNC: 13.9 G/DL (ref 13–17.7)
IMM GRANULOCYTES # BLD AUTO: 0.01 10*3/MM3 (ref 0–0.05)
IMM GRANULOCYTES NFR BLD AUTO: 0.2 % (ref 0–0.5)
LDLC SERPL CALC-MCNC: 76 MG/DL (ref 0–100)
LDLC/HDLC SERPL: 1.23 {RATIO}
LYMPHOCYTES # BLD AUTO: 1.29 10*3/MM3 (ref 0.7–3.1)
LYMPHOCYTES NFR BLD AUTO: 30.4 % (ref 19.6–45.3)
MAGNESIUM SERPL-MCNC: 2.3 MG/DL (ref 1.6–2.4)
MCH RBC QN AUTO: 32.6 PG (ref 26.6–33)
MCHC RBC AUTO-ENTMCNC: 34.8 G/DL (ref 31.5–35.7)
MCV RBC AUTO: 93.7 FL (ref 79–97)
MONOCYTES # BLD AUTO: 0.48 10*3/MM3 (ref 0.1–0.9)
MONOCYTES NFR BLD AUTO: 11.3 % (ref 5–12)
NEUTROPHILS NFR BLD AUTO: 2.29 10*3/MM3 (ref 1.7–7)
NEUTROPHILS NFR BLD AUTO: 53.9 % (ref 42.7–76)
NRBC BLD AUTO-RTO: 0 /100 WBC (ref 0–0.2)
PLATELET # BLD AUTO: 194 10*3/MM3 (ref 140–450)
PMV BLD AUTO: 9.6 FL (ref 6–12)
POTASSIUM SERPL-SCNC: 4.4 MMOL/L (ref 3.5–5.2)
PROT SERPL-MCNC: 7.1 G/DL (ref 6–8.5)
RBC # BLD AUTO: 4.27 10*6/MM3 (ref 4.14–5.8)
SODIUM SERPL-SCNC: 142 MMOL/L (ref 136–145)
TRIGL SERPL-MCNC: 59 MG/DL (ref 0–150)
TSH SERPL DL<=0.05 MIU/L-ACNC: 1.26 UIU/ML (ref 0.27–4.2)
VLDLC SERPL-MCNC: 12 MG/DL (ref 5–40)
WBC NRBC COR # BLD: 4.25 10*3/MM3 (ref 3.4–10.8)

## 2023-03-03 PROCEDURE — 36415 COLL VENOUS BLD VENIPUNCTURE: CPT

## 2023-03-03 PROCEDURE — 80053 COMPREHEN METABOLIC PANEL: CPT

## 2023-03-03 PROCEDURE — 84443 ASSAY THYROID STIM HORMONE: CPT

## 2023-03-03 PROCEDURE — 80061 LIPID PANEL: CPT

## 2023-03-03 PROCEDURE — 83735 ASSAY OF MAGNESIUM: CPT

## 2023-03-03 PROCEDURE — 85025 COMPLETE CBC W/AUTO DIFF WBC: CPT

## 2023-03-03 NOTE — TELEPHONE ENCOUNTER
Please let him know that his labs look great.  His kidney function is normal, his cholesterol is normal, he is not anemic, and his thyroid screening is normal.    Thanks!  FRANCIA Josue

## 2023-03-07 NOTE — TELEPHONE ENCOUNTER
Left VM of results and to call back with any further questions or concerns, allowed by verbal release form.     Renetta Kat RN  Triage [facility]

## 2023-03-07 NOTE — TELEPHONE ENCOUNTER
Notified patient of results. Patient verbalized understanding.    Renetta Kat RN  Triage [facility]

## 2023-03-16 ENCOUNTER — OFFICE VISIT (OUTPATIENT)
Dept: SURGERY | Facility: CLINIC | Age: 63
End: 2023-03-16
Payer: COMMERCIAL

## 2023-03-16 VITALS — WEIGHT: 187 LBS | BODY MASS INDEX: 25.33 KG/M2 | HEIGHT: 72 IN

## 2023-03-16 DIAGNOSIS — K40.90 LEFT INGUINAL HERNIA: Primary | ICD-10-CM

## 2023-03-16 PROCEDURE — 99203 OFFICE O/P NEW LOW 30 MIN: CPT | Performed by: SURGERY

## 2023-03-16 RX ORDER — CEFAZOLIN SODIUM 2 G/100ML
2 INJECTION, SOLUTION INTRAVENOUS ONCE
OUTPATIENT
Start: 2023-04-10 | End: 2023-03-16

## 2023-03-16 NOTE — PROGRESS NOTES
Subjective   Brayden Rainey is a 62 y.o. male who presents to the office for a left inguinal hernia.    History of Present Illness     The patient is a previous history of a right inguinal hernia and underwent an open right inguinal hernia repair several years ago.  He has recovered well from that procedure and had no further problems.    He has developed a bulge in the left groin that is similar to his previous right inguinal hernia.  He works on his feet for 10 hours a day and has pain in the left groin near the end of the day.  At times the bulge will be large and he has to manually reduce it to improve the pain.  He has never had an episode of incarceration.  He has not had any change in his bowel or bladder function.    He has had several colonoscopies in the past and has a history of colon polyps.  His last colonoscopy was in 2019 in [place] at which time a polypectomy was performed.  He is due for repeat colonoscopy in 2024.    He had an acute MI in August 2021 and had 2 stents placed.  He has been on Plavix since that time.  He does not have any chest pain or shortness of breath.    Review of Systems   Constitutional: Negative for fatigue and fever.   Respiratory: Negative for chest tightness and shortness of breath.    Cardiovascular: Negative for chest pain and palpitations.   Gastrointestinal: Positive for abdominal pain. Negative for blood in stool, constipation, diarrhea, nausea and vomiting.     Past Medical History:   Diagnosis Date   • Anxiety 7/4/2021   • Atrial fibrillation (HCC)    • Coronary artery disease    • Hyperlipidemia    • PTSD (post-traumatic stress disorder) 7/4/2021     Past Surgical History:   Procedure Laterality Date   • CARDIAC CATHETERIZATION N/A 7/4/2021    Procedure: Left Heart Cath;  Surgeon: Jericho Lai MD;  Location: [facility] CATH INVASIVE LOCATION;  Service: Cardiology;  Laterality: N/A;   • CARDIAC CATHETERIZATION N/A 7/4/2021    Procedure: Percutaneous Coronary  Intervention;  Surgeon: Jericho Lai MD;  Location:  [facility] CATH INVASIVE LOCATION;  Service: Cardiology;  Laterality: N/A;   • CARDIAC CATHETERIZATION N/A 7/4/2021    Procedure: Stent MONA coronary;  Surgeon: Jericho Lai MD;  Location:  [facility] CATH INVASIVE LOCATION;  Service: Cardiology;  Laterality: N/A;   • CARDIAC CATHETERIZATION N/A 8/19/2021    Procedure: STENT MONA - CORONARY;  Surgeon: Fer Henry MD;  Location:  [facility] CATH INVASIVE LOCATION;  Service: Cardiovascular;  Laterality: N/A;   • CARDIAC CATHETERIZATION N/A 8/19/2021    Procedure: Left Heart Cath;  Surgeon: Fer Henry MD;  Location:  [facility] CATH INVASIVE LOCATION;  Service: Cardiovascular;  Laterality: N/A;   • CARDIAC CATHETERIZATION N/A 8/19/2021    Procedure: Coronary angiography;  Surgeon: Fer Henry MD;  Location:  [facility] CATH INVASIVE LOCATION;  Service: Cardiovascular;  Laterality: N/A;   • CARDIAC CATHETERIZATION N/A 8/19/2021    Procedure: Optical Coherent Tomography;  Surgeon: Fer Henry MD;  Location:  [facility] CATH INVASIVE LOCATION;  Service: Cardiovascular;  Laterality: N/A;   • CORONARY ANGIOPLASTY     • HERNIA REPAIR     • LIPOMA EXCISION     • SEPTOPLASTY       Family History   Problem Relation Age of Onset   • Heart attack Father    • Heart disease Father      Social History     Socioeconomic History   • Marital status: Single   Tobacco Use   • Smoking status: Former   • Smokeless tobacco: Never   • Tobacco comments:     decaff or half caff   Vaping Use   • Vaping Use: Never used   Substance and Sexual Activity   • Alcohol use: Yes     Alcohol/week: 1.0 standard drink     Types: 1 Glasses of wine per week     Comment: occasional   • Drug use: Never   • Sexual activity: Defer       Objective   Physical Exam  Constitutional:       Appearance: Normal appearance. He is well-developed. He is not toxic-appearing.   Eyes:      General: No scleral icterus.  Pulmonary:      Effort: Pulmonary effort is normal. No  respiratory distress.   Abdominal:      Palpations: Abdomen is soft.      Tenderness: There is no abdominal tenderness.      Hernia: A hernia is present. Hernia is present in the left inguinal area. There is no hernia in the right inguinal area.      Comments: There is a moderately sized left inguinal hernia that is reducible but tender to palpation.   Skin:     General: Skin is warm and dry.   Neurological:      Mental Status: He is alert and oriented to person, place, and time.   Psychiatric:         Behavior: Behavior normal.         Thought Content: Thought content normal.         Judgment: Judgment normal.         Assessment & Plan     1.  Left inguinal hernia: The patient has a left inguinal hernia that is symptomatic and likely contains bowel.  It is reducible.  He is at risk of developing an incarceration.  He will need a left inguinal hernia repair.  Approaches to inguinal hernia repair were discussed with him.  He will be scheduled for a da Jesusita robot-assisted laparoscopic left inguinal hernia repair.  The patient understands the indications, alternatives, risks, and benefits of the procedure and wishes to proceed.  He will contact our office to schedule the surgery after making arrangements with his job.  He will need to be off Plavix for 5 days prior to surgery.

## 2023-03-16 NOTE — H&P (VIEW-ONLY)
Subjective   Brayden Rainey is a 62 y.o. male who presents to the office for a left inguinal hernia.    History of Present Illness     The patient is a previous history of a right inguinal hernia and underwent an open right inguinal hernia repair several years ago.  He has recovered well from that procedure and had no further problems.    He has developed a bulge in the left groin that is similar to his previous right inguinal hernia.  He works on his feet for 10 hours a day and has pain in the left groin near the end of the day.  At times the bulge will be large and he has to manually reduce it to improve the pain.  He has never had an episode of incarceration.  He has not had any change in his bowel or bladder function.    He has had several colonoscopies in the past and has a history of colon polyps.  His last colonoscopy was in 2019 in [place] at which time a polypectomy was performed.  He is due for repeat colonoscopy in 2024.    He had an acute MI in August 2021 and had 2 stents placed.  He has been on Plavix since that time.  He does not have any chest pain or shortness of breath.    Review of Systems   Constitutional: Negative for fatigue and fever.   Respiratory: Negative for chest tightness and shortness of breath.    Cardiovascular: Negative for chest pain and palpitations.   Gastrointestinal: Positive for abdominal pain. Negative for blood in stool, constipation, diarrhea, nausea and vomiting.     Past Medical History:   Diagnosis Date   • Anxiety 7/4/2021   • Atrial fibrillation (HCC)    • Coronary artery disease    • Hyperlipidemia    • PTSD (post-traumatic stress disorder) 7/4/2021     Past Surgical History:   Procedure Laterality Date   • CARDIAC CATHETERIZATION N/A 7/4/2021    Procedure: Left Heart Cath;  Surgeon: Jericho Lai MD;  Location: [facility] CATH INVASIVE LOCATION;  Service: Cardiology;  Laterality: N/A;   • CARDIAC CATHETERIZATION N/A 7/4/2021    Procedure: Percutaneous Coronary  Intervention;  Surgeon: Jericho Lai MD;  Location:  [facility] CATH INVASIVE LOCATION;  Service: Cardiology;  Laterality: N/A;   • CARDIAC CATHETERIZATION N/A 7/4/2021    Procedure: Stent MONA coronary;  Surgeon: Jericho Lai MD;  Location:  [facility] CATH INVASIVE LOCATION;  Service: Cardiology;  Laterality: N/A;   • CARDIAC CATHETERIZATION N/A 8/19/2021    Procedure: STENT MONA - CORONARY;  Surgeon: Fer Henry MD;  Location:  [facility] CATH INVASIVE LOCATION;  Service: Cardiovascular;  Laterality: N/A;   • CARDIAC CATHETERIZATION N/A 8/19/2021    Procedure: Left Heart Cath;  Surgeon: Fer Henry MD;  Location:  [facility] CATH INVASIVE LOCATION;  Service: Cardiovascular;  Laterality: N/A;   • CARDIAC CATHETERIZATION N/A 8/19/2021    Procedure: Coronary angiography;  Surgeon: Fer Henry MD;  Location:  [facility] CATH INVASIVE LOCATION;  Service: Cardiovascular;  Laterality: N/A;   • CARDIAC CATHETERIZATION N/A 8/19/2021    Procedure: Optical Coherent Tomography;  Surgeon: Fer Henry MD;  Location:  [facility] CATH INVASIVE LOCATION;  Service: Cardiovascular;  Laterality: N/A;   • CORONARY ANGIOPLASTY     • HERNIA REPAIR     • LIPOMA EXCISION     • SEPTOPLASTY       Family History   Problem Relation Age of Onset   • Heart attack Father    • Heart disease Father      Social History     Socioeconomic History   • Marital status: Single   Tobacco Use   • Smoking status: Former   • Smokeless tobacco: Never   • Tobacco comments:     decaff or half caff   Vaping Use   • Vaping Use: Never used   Substance and Sexual Activity   • Alcohol use: Yes     Alcohol/week: 1.0 standard drink     Types: 1 Glasses of wine per week     Comment: occasional   • Drug use: Never   • Sexual activity: Defer       Objective   Physical Exam  Constitutional:       Appearance: Normal appearance. He is well-developed. He is not toxic-appearing.   Eyes:      General: No scleral icterus.  Pulmonary:      Effort: Pulmonary effort is normal. No  respiratory distress.   Abdominal:      Palpations: Abdomen is soft.      Tenderness: There is no abdominal tenderness.      Hernia: A hernia is present. Hernia is present in the left inguinal area. There is no hernia in the right inguinal area.      Comments: There is a moderately sized left inguinal hernia that is reducible but tender to palpation.   Skin:     General: Skin is warm and dry.   Neurological:      Mental Status: He is alert and oriented to person, place, and time.   Psychiatric:         Behavior: Behavior normal.         Thought Content: Thought content normal.         Judgment: Judgment normal.         Assessment & Plan     1.  Left inguinal hernia: The patient has a left inguinal hernia that is symptomatic and likely contains bowel.  It is reducible.  He is at risk of developing an incarceration.  He will need a left inguinal hernia repair.  Approaches to inguinal hernia repair were discussed with him.  He will be scheduled for a da Jesusita robot-assisted laparoscopic left inguinal hernia repair.  The patient understands the indications, alternatives, risks, and benefits of the procedure and wishes to proceed.  He will contact our office to schedule the surgery after making arrangements with his job.  He will need to be off Plavix for 5 days prior to surgery.

## 2023-03-27 PROBLEM — K40.90 LEFT INGUINAL HERNIA: Status: ACTIVE | Noted: 2023-03-27

## 2023-04-03 RX ORDER — LISINOPRIL 2.5 MG/1
2.5 TABLET ORAL
Qty: 30 TABLET | Refills: 1 | Status: SHIPPED | OUTPATIENT
Start: 2023-04-03

## 2023-04-07 ENCOUNTER — PRE-ADMISSION TESTING (OUTPATIENT)
Dept: PREADMISSION TESTING | Facility: HOSPITAL | Age: 63
End: 2023-04-07
Payer: COMMERCIAL

## 2023-04-07 ENCOUNTER — TELEPHONE (OUTPATIENT)
Dept: SURGERY | Facility: CLINIC | Age: 63
End: 2023-04-07
Payer: COMMERCIAL

## 2023-04-07 VITALS
SYSTOLIC BLOOD PRESSURE: 130 MMHG | HEART RATE: 62 BPM | WEIGHT: 184.8 LBS | OXYGEN SATURATION: 97 % | HEIGHT: 72 IN | TEMPERATURE: 98.4 F | DIASTOLIC BLOOD PRESSURE: 78 MMHG | RESPIRATION RATE: 16 BRPM | BODY MASS INDEX: 25.03 KG/M2

## 2023-04-07 LAB
ANION GAP SERPL CALCULATED.3IONS-SCNC: 9 MMOL/L (ref 5–15)
BUN SERPL-MCNC: 13 MG/DL (ref 8–23)
BUN/CREAT SERPL: 13.8 (ref 7–25)
CALCIUM SPEC-SCNC: 9.4 MG/DL (ref 8.6–10.5)
CHLORIDE SERPL-SCNC: 103 MMOL/L (ref 98–107)
CO2 SERPL-SCNC: 27 MMOL/L (ref 22–29)
CREAT SERPL-MCNC: 0.94 MG/DL (ref 0.76–1.27)
DEPRECATED RDW RBC AUTO: 43.3 FL (ref 37–54)
EGFRCR SERPLBLD CKD-EPI 2021: 92.2 ML/MIN/1.73
ERYTHROCYTE [DISTWIDTH] IN BLOOD BY AUTOMATED COUNT: 12.4 % (ref 12.3–15.4)
GLUCOSE SERPL-MCNC: 121 MG/DL (ref 65–99)
HCT VFR BLD AUTO: 39.8 % (ref 37.5–51)
HGB BLD-MCNC: 13.1 G/DL (ref 13–17.7)
INR PPP: 1.02 (ref 0.9–1.1)
MCH RBC QN AUTO: 31.8 PG (ref 26.6–33)
MCHC RBC AUTO-ENTMCNC: 32.9 G/DL (ref 31.5–35.7)
MCV RBC AUTO: 96.6 FL (ref 79–97)
PLATELET # BLD AUTO: 187 10*3/MM3 (ref 140–450)
PMV BLD AUTO: 10 FL (ref 6–12)
POTASSIUM SERPL-SCNC: 4.1 MMOL/L (ref 3.5–5.2)
PROTHROMBIN TIME: 13.5 SECONDS (ref 11.7–14.2)
RBC # BLD AUTO: 4.12 10*6/MM3 (ref 4.14–5.8)
SODIUM SERPL-SCNC: 139 MMOL/L (ref 136–145)
WBC NRBC COR # BLD: 4.58 10*3/MM3 (ref 3.4–10.8)

## 2023-04-07 PROCEDURE — 85027 COMPLETE CBC AUTOMATED: CPT

## 2023-04-07 PROCEDURE — 36415 COLL VENOUS BLD VENIPUNCTURE: CPT

## 2023-04-07 PROCEDURE — 80048 BASIC METABOLIC PNL TOTAL CA: CPT

## 2023-04-07 PROCEDURE — 85610 PROTHROMBIN TIME: CPT

## 2023-04-07 RX ORDER — CHLORHEXIDINE GLUCONATE 500 MG/1
CLOTH TOPICAL TAKE AS DIRECTED
COMMUNITY
End: 2023-04-10 | Stop reason: HOSPADM

## 2023-04-07 NOTE — TELEPHONE ENCOUNTER
I left a voicemail for the patient to call back to confirm his new arrival time. He is to now arrive at 9am on 4/10/23 for surgery with Dr. Wilkinson.

## 2023-04-07 NOTE — DISCHARGE INSTRUCTIONS
Take the following medications the morning of surgery: CARVEDILOL    ARRIVE AT 8:00 AM      If you are on prescription narcotic pain medication to control your pain you may also take that medication the morning of surgery.    General Instructions:  Do not eat solid food after midnight the night before surgery.  You may drink clear liquids day of surgery but must stop at least one hour before your hospital arrival time. CUTOFF TIME IS 7:00 AM.  It is beneficial for you to have a clear drink that contains carbohydrates the day of surgery.  We suggest a 12 to 20 ounce bottle of Gatorade or Powerade for non-diabetic patients or a 12 to 20 ounce bottle of G2 or Powerade Zero for diabetic patients. (Pediatric patients, are not advised to drink a 12 to 20 ounce carbohydrate drink)    Clear liquids are liquids you can see through.  Nothing red in color.     Plain water                               Sports drinks  Sodas                                   Gelatin (Jell-O)  Fruit juices without pulp such as white grape juice and apple juice  Popsicles that contain no fruit or yogurt  Tea or coffee (no cream or milk added)  Gatorade / Powerade  G2 / Powerade Zero    Patients who avoid smoking, chewing tobacco and alcohol for 4 weeks prior to surgery have a reduced risk of post-operative complications.  Quit smoking as many days before surgery as you can.  Do not smoke, use chewing tobacco or drink alcohol the day of surgery.   If applicable bring your C-PAP/ BI-PAP machine.  Bring any papers given to you in the doctor’s office.  Wear clean comfortable clothes.  Do not wear contact lenses, false eyelashes or make-up.  Bring a case for your glasses.   Bring crutches or walker if applicable.  Remove all piercings.  Leave jewelry and any other valuables at home.  Hair extensions with metal clips must be removed prior to surgery.  The Pre-Admission Testing nurse will instruct you to bring medications if unable to obtain an accurate  list in Pre-Admission Testing.          Preventing a Surgical Site Infection:  For 2 to 3 days before surgery, avoid shaving with a razor because the razor can irritate skin and make it easier to develop an infection.    Any areas of open skin can increase the risk of a post-operative wound infection by allowing bacteria to enter and travel throughout the body.  Notify your surgeon if you have any skin wounds / rashes even if it is not near the expected surgical site.  The area will need assessed to determine if surgery should be delayed until it is healed.  The night prior to surgery shower using a fresh bar of anti-bacterial soap (such as Dial) and clean washcloth.  Sleep in a clean bed with clean clothing.  Do not allow pets to sleep with you.  Shower on the morning of surgery using a fresh bar of anti-bacterial soap (such as Dial) and clean washcloth.  Dry with a clean towel and dress in clean clothing.    CHLORHEXIDINE CLOTH INSTRUCTIONS  The morning of surgery follow these instructions using the Chlorhexidine cloths you've been given.  These steps reduce bacteria on the body.  Do not use the cloths near your eyes, ears mouth, genitalia or on open wounds.  Throw the cloths away after use but do not try to flush them down a toilet.      Open and remove one cloth at a time from the package.    Leave the cloth unfolded and begin the bathing.  Massage the skin with the cloths using gentle pressure to remove bacteria.  Do not scrub harshly.   Follow the steps below with one 2% CHG cloth per area (6 total cloths).  One cloth for neck, shoulders and chest.  One cloth for both arms, hands, fingers and underarms (do underarms last).  One cloth for the abdomen followed by groin.  One cloth for right leg and foot including between the toes.  One cloth for left leg and foot including between the toes.  The last cloth is to be used for the back of the neck, back and buttocks.    Allow the CHG to air dry 3 minutes on the  skin which will give it time to work and decrease the chance of irritation.  The skin may feel sticky until it is dry.  Do not rinse with water or any other liquid or you will lose the beneficial effects of the CHG.  If mild skin irritation occurs, do rinse the skin to remove the CHG.  Report this to the nurse at time of admission.  Do not apply lotions, creams, ointments, deodorants or perfumes after using the cloths. Dress in clean clothes before coming to the hospital.   Ask your surgeon if you will be receiving antibiotics prior to surgery.  Make sure you, your family, and all healthcare providers clean their hands with soap and water or an alcohol based hand  before caring for you or your wound.    Day of surgery:  Your arrival time is approximately two hours before your scheduled surgery time.  Upon arrival, a Pre-op nurse and Anesthesiologist will review your health history, obtain vital signs, and answer questions you may have.  The only belongings needed at this time will be a list of your home medications and if applicable your C-PAP/BI-PAP machine.  A Pre-op nurse will start an IV and you may receive medication in preparation for surgery, including something to help you relax.     Please be aware that surgery does come with discomfort.  We want to make every effort to control your discomfort so please discuss any uncontrolled symptoms with your nurse.   Your doctor will most likely have prescribed pain medications.      If you are going home after surgery you will receive individualized written care instructions before being discharged.  A responsible adult must drive you to and from the hospital on the day of your surgery and stay with you for 24 hours.  Discharge prescriptions can be filled by the hospital pharmacy during regular pharmacy hours.  If you are having surgery late in the day/evening your prescription may be e-prescribed to your pharmacy.  Please verify your pharmacy hours or chose  a 24 hour pharmacy to avoid not having access to your prescription because your pharmacy has closed for the day.    If you are staying overnight following surgery, you will be transported to your hospital room following the recovery period.  Psychiatric has all private rooms.    If you have any questions please call Pre-Admission Testing at [phone].  Deductibles and co-payments are collected on the day of service. Please be prepared to pay the required co-pay, deductible or deposit on the day of service as defined by your plan.    Call your surgeon immediately if you experience any of the following symptoms:  Sore Throat  Shortness of Breath or difficulty breathing  Cough  Chills  Body soreness or muscle pain  Headache  Fever  New loss of taste or smell  Do not arrive for your surgery ill.  Your procedure will need to be rescheduled to another time.  You will need to call your physician before the day of surgery to avoid any unnecessary exposure to hospital staff as well as other patients.

## 2023-04-10 ENCOUNTER — ANESTHESIA EVENT (OUTPATIENT)
Dept: PERIOP | Facility: HOSPITAL | Age: 63
End: 2023-04-10
Payer: COMMERCIAL

## 2023-04-10 ENCOUNTER — ANESTHESIA (OUTPATIENT)
Dept: PERIOP | Facility: HOSPITAL | Age: 63
End: 2023-04-10
Payer: COMMERCIAL

## 2023-04-10 ENCOUNTER — HOSPITAL ENCOUNTER (OUTPATIENT)
Facility: HOSPITAL | Age: 63
Setting detail: HOSPITAL OUTPATIENT SURGERY
Discharge: HOME | End: 2023-04-10
Attending: SURGERY | Admitting: SURGERY
Payer: COMMERCIAL

## 2023-04-10 VITALS
RESPIRATION RATE: 18 BRPM | DIASTOLIC BLOOD PRESSURE: 88 MMHG | HEART RATE: 64 BPM | OXYGEN SATURATION: 98 % | SYSTOLIC BLOOD PRESSURE: 151 MMHG | TEMPERATURE: 97.6 F

## 2023-04-10 DIAGNOSIS — K40.90 LEFT INGUINAL HERNIA: ICD-10-CM

## 2023-04-10 PROCEDURE — 25010000002 PROPOFOL 10 MG/ML EMULSION: Performed by: NURSE ANESTHETIST, CERTIFIED REGISTERED

## 2023-04-10 PROCEDURE — 25010000002 FENTANYL CITRATE (PF) 50 MCG/ML SOLUTION: Performed by: NURSE ANESTHETIST, CERTIFIED REGISTERED

## 2023-04-10 PROCEDURE — 49650 LAP ING HERNIA REPAIR INIT: CPT | Performed by: SURGERY

## 2023-04-10 PROCEDURE — 25010000002 ONDANSETRON PER 1 MG: Performed by: NURSE ANESTHETIST, CERTIFIED REGISTERED

## 2023-04-10 PROCEDURE — 25010000002 MIDAZOLAM PER 1 MG: Performed by: ANESTHESIOLOGY

## 2023-04-10 PROCEDURE — 25010000002 DEXAMETHASONE PER 1 MG: Performed by: NURSE ANESTHETIST, CERTIFIED REGISTERED

## 2023-04-10 PROCEDURE — 25010000002 HYDROMORPHONE 1 MG/ML SOLUTION: Performed by: NURSE ANESTHETIST, CERTIFIED REGISTERED

## 2023-04-10 PROCEDURE — C1781 MESH (IMPLANTABLE): HCPCS | Performed by: SURGERY

## 2023-04-10 PROCEDURE — 25010000002 KETOROLAC TROMETHAMINE PER 15 MG: Performed by: NURSE ANESTHETIST, CERTIFIED REGISTERED

## 2023-04-10 PROCEDURE — 49650 LAP ING HERNIA REPAIR INIT: CPT | Performed by: SPECIALIST/TECHNOLOGIST, OTHER

## 2023-04-10 PROCEDURE — 25010000002 CEFAZOLIN IN DEXTROSE 2-4 GM/100ML-% SOLUTION: Performed by: SURGERY

## 2023-04-10 DEVICE — KNOTLESS TISSUE CONTROL DEVICE, UNDYED UNIDIRECTIONAL (ANTIBACTERIAL) SYNTHETIC ABSORBABLE DEVICE
Type: IMPLANTABLE DEVICE | Site: ABDOMEN | Status: FUNCTIONAL
Brand: STRATAFIX

## 2023-04-10 DEVICE — 3DMAX™ MID ANATOMICAL MESH, XL, LEFT, 5” X 7”, 12 X 17 CM
Type: IMPLANTABLE DEVICE | Site: ABDOMEN | Status: FUNCTIONAL
Brand: 3DMAX™ MID ANATOMICAL MESH

## 2023-04-10 RX ORDER — FAMOTIDINE 10 MG/ML
20 INJECTION, SOLUTION INTRAVENOUS ONCE
Status: COMPLETED | OUTPATIENT
Start: 2023-04-10 | End: 2023-04-10

## 2023-04-10 RX ORDER — SODIUM CHLORIDE, SODIUM LACTATE, POTASSIUM CHLORIDE, CALCIUM CHLORIDE 600; 310; 30; 20 MG/100ML; MG/100ML; MG/100ML; MG/100ML
9 INJECTION, SOLUTION INTRAVENOUS CONTINUOUS
Status: DISCONTINUED | OUTPATIENT
Start: 2023-04-10 | End: 2023-04-10 | Stop reason: HOSPADM

## 2023-04-10 RX ORDER — HYDROCODONE BITARTRATE AND ACETAMINOPHEN 5; 325 MG/1; MG/1
1 TABLET ORAL EVERY 6 HOURS PRN
Qty: 10 TABLET | Refills: 0 | Status: SHIPPED | OUTPATIENT
Start: 2023-04-10

## 2023-04-10 RX ORDER — FLUMAZENIL 0.1 MG/ML
0.2 INJECTION INTRAVENOUS AS NEEDED
Status: DISCONTINUED | OUTPATIENT
Start: 2023-04-10 | End: 2023-04-10 | Stop reason: HOSPADM

## 2023-04-10 RX ORDER — SODIUM CHLORIDE 0.9 % (FLUSH) 0.9 %
3 SYRINGE (ML) INJECTION EVERY 12 HOURS SCHEDULED
Status: DISCONTINUED | OUTPATIENT
Start: 2023-04-10 | End: 2023-04-10 | Stop reason: HOSPADM

## 2023-04-10 RX ORDER — FENTANYL CITRATE 50 UG/ML
INJECTION, SOLUTION INTRAMUSCULAR; INTRAVENOUS AS NEEDED
Status: DISCONTINUED | OUTPATIENT
Start: 2023-04-10 | End: 2023-04-10 | Stop reason: SURG

## 2023-04-10 RX ORDER — HYDROMORPHONE HYDROCHLORIDE 1 MG/ML
0.5 INJECTION, SOLUTION INTRAMUSCULAR; INTRAVENOUS; SUBCUTANEOUS
Status: DISCONTINUED | OUTPATIENT
Start: 2023-04-10 | End: 2023-04-10 | Stop reason: HOSPADM

## 2023-04-10 RX ORDER — MAGNESIUM HYDROXIDE 1200 MG/15ML
LIQUID ORAL AS NEEDED
Status: DISCONTINUED | OUTPATIENT
Start: 2023-04-10 | End: 2023-04-10 | Stop reason: HOSPADM

## 2023-04-10 RX ORDER — DIPHENHYDRAMINE HYDROCHLORIDE 50 MG/ML
12.5 INJECTION INTRAMUSCULAR; INTRAVENOUS
Status: DISCONTINUED | OUTPATIENT
Start: 2023-04-10 | End: 2023-04-10 | Stop reason: HOSPADM

## 2023-04-10 RX ORDER — LIDOCAINE HYDROCHLORIDE 20 MG/ML
INJECTION, SOLUTION INFILTRATION; PERINEURAL AS NEEDED
Status: DISCONTINUED | OUTPATIENT
Start: 2023-04-10 | End: 2023-04-10 | Stop reason: SURG

## 2023-04-10 RX ORDER — ONDANSETRON 2 MG/ML
INJECTION INTRAMUSCULAR; INTRAVENOUS AS NEEDED
Status: DISCONTINUED | OUTPATIENT
Start: 2023-04-10 | End: 2023-04-10 | Stop reason: SURG

## 2023-04-10 RX ORDER — HYDROCODONE BITARTRATE AND ACETAMINOPHEN 7.5; 325 MG/1; MG/1
1 TABLET ORAL ONCE AS NEEDED
Status: COMPLETED | OUTPATIENT
Start: 2023-04-10 | End: 2023-04-10

## 2023-04-10 RX ORDER — OXYCODONE AND ACETAMINOPHEN 7.5; 325 MG/1; MG/1
1 TABLET ORAL EVERY 4 HOURS PRN
Status: DISCONTINUED | OUTPATIENT
Start: 2023-04-10 | End: 2023-04-10 | Stop reason: HOSPADM

## 2023-04-10 RX ORDER — KETOROLAC TROMETHAMINE 30 MG/ML
INJECTION, SOLUTION INTRAMUSCULAR; INTRAVENOUS AS NEEDED
Status: DISCONTINUED | OUTPATIENT
Start: 2023-04-10 | End: 2023-04-10 | Stop reason: SURG

## 2023-04-10 RX ORDER — HYDRALAZINE HYDROCHLORIDE 20 MG/ML
5 INJECTION INTRAMUSCULAR; INTRAVENOUS
Status: DISCONTINUED | OUTPATIENT
Start: 2023-04-10 | End: 2023-04-10 | Stop reason: HOSPADM

## 2023-04-10 RX ORDER — DROPERIDOL 2.5 MG/ML
0.62 INJECTION, SOLUTION INTRAMUSCULAR; INTRAVENOUS
Status: DISCONTINUED | OUTPATIENT
Start: 2023-04-10 | End: 2023-04-10 | Stop reason: HOSPADM

## 2023-04-10 RX ORDER — PROPOFOL 10 MG/ML
VIAL (ML) INTRAVENOUS AS NEEDED
Status: DISCONTINUED | OUTPATIENT
Start: 2023-04-10 | End: 2023-04-10 | Stop reason: SURG

## 2023-04-10 RX ORDER — LABETALOL HYDROCHLORIDE 5 MG/ML
5 INJECTION, SOLUTION INTRAVENOUS
Status: DISCONTINUED | OUTPATIENT
Start: 2023-04-10 | End: 2023-04-10 | Stop reason: HOSPADM

## 2023-04-10 RX ORDER — DEXAMETHASONE SODIUM PHOSPHATE 4 MG/ML
INJECTION, SOLUTION INTRA-ARTICULAR; INTRALESIONAL; INTRAMUSCULAR; INTRAVENOUS; SOFT TISSUE AS NEEDED
Status: DISCONTINUED | OUTPATIENT
Start: 2023-04-10 | End: 2023-04-10 | Stop reason: SURG

## 2023-04-10 RX ORDER — IPRATROPIUM BROMIDE AND ALBUTEROL SULFATE 2.5; .5 MG/3ML; MG/3ML
3 SOLUTION RESPIRATORY (INHALATION) ONCE AS NEEDED
Status: DISCONTINUED | OUTPATIENT
Start: 2023-04-10 | End: 2023-04-10 | Stop reason: HOSPADM

## 2023-04-10 RX ORDER — FENTANYL CITRATE 50 UG/ML
50 INJECTION, SOLUTION INTRAMUSCULAR; INTRAVENOUS
Status: DISCONTINUED | OUTPATIENT
Start: 2023-04-10 | End: 2023-04-10 | Stop reason: HOSPADM

## 2023-04-10 RX ORDER — SODIUM CHLORIDE 0.9 % (FLUSH) 0.9 %
3-10 SYRINGE (ML) INJECTION AS NEEDED
Status: DISCONTINUED | OUTPATIENT
Start: 2023-04-10 | End: 2023-04-10 | Stop reason: HOSPADM

## 2023-04-10 RX ORDER — PROMETHAZINE HYDROCHLORIDE 25 MG/1
25 SUPPOSITORY RECTAL ONCE AS NEEDED
Status: DISCONTINUED | OUTPATIENT
Start: 2023-04-10 | End: 2023-04-10 | Stop reason: HOSPADM

## 2023-04-10 RX ORDER — EPHEDRINE SULFATE 50 MG/ML
5 INJECTION, SOLUTION INTRAVENOUS ONCE AS NEEDED
Status: DISCONTINUED | OUTPATIENT
Start: 2023-04-10 | End: 2023-04-10 | Stop reason: HOSPADM

## 2023-04-10 RX ORDER — CEFAZOLIN SODIUM 2 G/100ML
2 INJECTION, SOLUTION INTRAVENOUS ONCE
Status: COMPLETED | OUTPATIENT
Start: 2023-04-10 | End: 2023-04-10

## 2023-04-10 RX ORDER — BUPIVACAINE HYDROCHLORIDE AND EPINEPHRINE 5; 5 MG/ML; UG/ML
INJECTION, SOLUTION EPIDURAL; INTRACAUDAL; PERINEURAL AS NEEDED
Status: DISCONTINUED | OUTPATIENT
Start: 2023-04-10 | End: 2023-04-10 | Stop reason: HOSPADM

## 2023-04-10 RX ORDER — ONDANSETRON 2 MG/ML
4 INJECTION INTRAMUSCULAR; INTRAVENOUS ONCE AS NEEDED
Status: DISCONTINUED | OUTPATIENT
Start: 2023-04-10 | End: 2023-04-10 | Stop reason: HOSPADM

## 2023-04-10 RX ORDER — NALOXONE HCL 0.4 MG/ML
0.2 VIAL (ML) INJECTION AS NEEDED
Status: DISCONTINUED | OUTPATIENT
Start: 2023-04-10 | End: 2023-04-10 | Stop reason: HOSPADM

## 2023-04-10 RX ORDER — PROMETHAZINE HYDROCHLORIDE 25 MG/1
25 TABLET ORAL ONCE AS NEEDED
Status: DISCONTINUED | OUTPATIENT
Start: 2023-04-10 | End: 2023-04-10 | Stop reason: HOSPADM

## 2023-04-10 RX ORDER — SODIUM CHLORIDE, SODIUM LACTATE, POTASSIUM CHLORIDE, CALCIUM CHLORIDE 600; 310; 30; 20 MG/100ML; MG/100ML; MG/100ML; MG/100ML
INJECTION, SOLUTION INTRAVENOUS CONTINUOUS PRN
Status: DISCONTINUED | OUTPATIENT
Start: 2023-04-10 | End: 2023-04-10 | Stop reason: SURG

## 2023-04-10 RX ORDER — MIDAZOLAM HYDROCHLORIDE 1 MG/ML
1 INJECTION INTRAMUSCULAR; INTRAVENOUS
Status: DISCONTINUED | OUTPATIENT
Start: 2023-04-10 | End: 2023-04-10 | Stop reason: HOSPADM

## 2023-04-10 RX ORDER — LIDOCAINE HYDROCHLORIDE 10 MG/ML
0.5 INJECTION, SOLUTION EPIDURAL; INFILTRATION; INTRACAUDAL; PERINEURAL ONCE AS NEEDED
Status: DISCONTINUED | OUTPATIENT
Start: 2023-04-10 | End: 2023-04-10 | Stop reason: HOSPADM

## 2023-04-10 RX ORDER — ROCURONIUM BROMIDE 10 MG/ML
INJECTION, SOLUTION INTRAVENOUS AS NEEDED
Status: DISCONTINUED | OUTPATIENT
Start: 2023-04-10 | End: 2023-04-10 | Stop reason: SURG

## 2023-04-10 RX ORDER — GLYCOPYRROLATE 0.2 MG/ML
INJECTION INTRAMUSCULAR; INTRAVENOUS AS NEEDED
Status: DISCONTINUED | OUTPATIENT
Start: 2023-04-10 | End: 2023-04-10 | Stop reason: SURG

## 2023-04-10 RX ADMIN — ROCURONIUM BROMIDE 50 MG: 50 INJECTION INTRAVENOUS at 11:30

## 2023-04-10 RX ADMIN — FAMOTIDINE 20 MG: 10 INJECTION, SOLUTION INTRAVENOUS at 10:48

## 2023-04-10 RX ADMIN — DEXAMETHASONE SODIUM PHOSPHATE 8 MG: 4 INJECTION, SOLUTION INTRA-ARTICULAR; INTRALESIONAL; INTRAMUSCULAR; INTRAVENOUS; SOFT TISSUE at 11:45

## 2023-04-10 RX ADMIN — SODIUM CHLORIDE, POTASSIUM CHLORIDE, SODIUM LACTATE AND CALCIUM CHLORIDE: 600; 310; 30; 20 INJECTION, SOLUTION INTRAVENOUS at 10:36

## 2023-04-10 RX ADMIN — LIDOCAINE HYDROCHLORIDE 100 MG: 20 INJECTION, SOLUTION INFILTRATION; PERINEURAL at 11:30

## 2023-04-10 RX ADMIN — PROPOFOL 200 MG: 10 INJECTION, EMULSION INTRAVENOUS at 11:30

## 2023-04-10 RX ADMIN — SUGAMMADEX 200 MG: 100 INJECTION, SOLUTION INTRAVENOUS at 13:14

## 2023-04-10 RX ADMIN — SODIUM CHLORIDE, POTASSIUM CHLORIDE, SODIUM LACTATE AND CALCIUM CHLORIDE 9 ML/HR: 600; 310; 30; 20 INJECTION, SOLUTION INTRAVENOUS at 10:45

## 2023-04-10 RX ADMIN — KETOROLAC TROMETHAMINE 30 MG: 30 INJECTION, SOLUTION INTRAMUSCULAR at 13:13

## 2023-04-10 RX ADMIN — FENTANYL CITRATE 50 MCG: 50 INJECTION, SOLUTION INTRAMUSCULAR; INTRAVENOUS at 11:30

## 2023-04-10 RX ADMIN — CEFAZOLIN SODIUM 2 G: 2 INJECTION, SOLUTION INTRAVENOUS at 11:13

## 2023-04-10 RX ADMIN — SODIUM CHLORIDE, POTASSIUM CHLORIDE, SODIUM LACTATE AND CALCIUM CHLORIDE 9 ML/HR: 600; 310; 30; 20 INJECTION, SOLUTION INTRAVENOUS at 10:44

## 2023-04-10 RX ADMIN — ONDANSETRON 4 MG: 2 INJECTION INTRAMUSCULAR; INTRAVENOUS at 13:13

## 2023-04-10 RX ADMIN — ROCURONIUM BROMIDE 20 MG: 50 INJECTION INTRAVENOUS at 12:58

## 2023-04-10 RX ADMIN — HYDROCODONE BITARTRATE AND ACETAMINOPHEN 1 TABLET: 7.5; 325 TABLET ORAL at 13:56

## 2023-04-10 RX ADMIN — GLYCOPYRROLATE 0.2 MCG: 1 INJECTION INTRAMUSCULAR; INTRAVENOUS at 12:02

## 2023-04-10 RX ADMIN — HYDROMORPHONE HYDROCHLORIDE 0.5 MG: 1 INJECTION, SOLUTION INTRAMUSCULAR; INTRAVENOUS; SUBCUTANEOUS at 12:15

## 2023-04-10 RX ADMIN — MIDAZOLAM 1 MG: 1 INJECTION INTRAMUSCULAR; INTRAVENOUS at 11:00

## 2023-04-10 NOTE — DISCHARGE INSTRUCTIONS
Dr. Gino Wilkinson   [address]  [place], [place] [address] [phone]    Discharge Instructions for Hernia Surgery      Go home, rest and take it easy today; however, you should get up and move about several times today to reduce the risk of developing a clot in your legs.      You may experience some dizziness or memory loss from the anesthesia.  This may last for the next 24 hours.  Someone should plan on staying with you for the first 24 hours for your safety.    Do not make any important legal decisions or sign any legal papers for the next 24 hours.      Eat and drink lightly today.  Start off with liquids, jello, soup, crackers or other bland foods at first. You may advance your diet tomorrow as tolerated as long as you do not experience any nausea or vomiting.     You may remove your outer dressings in 2 days.  The white tapes called steri-strips should stay in place.  They will fall off on their own in 1-2 weeks.  Do not worry if they come off sooner.      You may notice some bleeding/drainage on your outer dressings. A little bloody drainage is normal. If the bleeding/drainage is such that the bandage cannot absorb it, remove the dressing, apply clean gauze and apply firm pressure for a full 15 minutes.  If the bleeding continues, please call me.    You may shower tomorrow.  No tub baths until your incisions are completely healed.     No lifting > 20 lbs. until you are seen at your follow-up visit.         You have received a prescription for a narcotic pain medicine, as you will have some pain following surgery.   You will not be totally pain free, but your pain medicine should make the pain tolerable.  Please take your pain medicine as prescribed and always take your pills with food to prevent nausea. If you are having severe pain that cannot be controlled by the pain medicine, please contact me.      If you had a laparoscopic surgery, it is not unusual to experience pain/discomfort in your  shoulders or under your ribs after surgery.  It is from the gas used during the laparoscopic procedure and usually lasts 1-3 days.  The prescription pain medicine is used to treat the surgical pain and does not typically alleviate this “gassy” pain.     No driving for 24 hours and for as long as you are taking your prescription pain medicine.      You will need to call the office at [phone] to schedule a follow-up appointment in 2 weeks.           Remember to contact me for any of the following:    Fever > 101 degrees  Severe pain that cannot be controlled by taking your pain pills  Severe nausea or vomiting   Significant bleeding of your incisions  Drainage that has a bad smell or is yellow or green in appearance  Any other questions or concerns        Additional Instruction for Inguinal Hernia Patients Only    If you did not urinate at the hospital after your surgery or if you feel the need to urinate and cannot, this will necessitate a return to the Emergency Room for placement of a urinary catheter.  You should also notify me as well.  As a rule, you should be able to empty your bladder within 4-6 hours after discharge from the hospital.      You may notice some scrotal bruising and/or swelling. A scrotal support or briefs as well as ice packs may be used to alleviate discomfort

## 2023-04-10 NOTE — OP NOTE
Surgeon: Gino Wilkinson Jr., M.D.    Assistant: Netta Cline CSA    An assistant was necessary and provided valuable retraction and exposure, as well as camera holding, instrument exchanges, needle and instrument retrieval, and wound closure.  An assistant was absolutely crucial for the success of this procedure especially while I was at the console.    Pre-Operative Diagnosis:     Left inguinal hernia [K40.90]    Post-Operative Diagnosis:    Left inguinal    Procedure Performed:     Da Jesusita robot assisted laparoscopic left inguinal hernia repair with mesh    Indications:     The patient is a pleasant 62-year-old male who had a previous right inguinal hernia repair and recently developed a bulge in the left groin that has gotten larger and increasingly symptomatic.  He was diagnosed with a left inguinal hernia.  He presents for da Jesusita robot-assisted laparoscopic left inguinal hernia repair.  The patient understands the indications, alternatives, risks, and benefits of the procedure and wishes to proceed.    Procedure:     The patient was identified and taken to the operating room where he was placed in the supine position on the operating table.  Monitors were placed and he underwent general endotracheal anesthesia and was appropriately monitored throughout the case by the anesthesia personnel.  The abdomen was prepped and draped in the standard surgical fashion.  Each incision was infiltrated with 0.5% Marcaine with epinephrine prior to making the incision.  A supraumbilical incision was made and carried through the skin into the subcutaneous tissue.  The abdominal wall was elevated with skin hooks and a Veress needle was inserted through the incision into the abdomen without difficulty.  The abdomen was then insufflated with low pressures encountered initially.  Once fully insufflated, the Veress needle was removed and an 8 mm da Jesusita port was placed in the same incision, again with traction applied to the  abdominal wall using skin hooks.  The laparoscope was inserted and the area of Veress needle and port insertion was inspected, no injury had occurred.  An 8 mm right mid abdominal port and left mid abdominal port were then placed by making a skin incision carried through the skin into the subcutaneous tissue and inserting the port through the incision into the abdomen with direct visualization intra-abdominal using laparoscope.  Attention was then turned to the pelvis.   There was a large indirect left inguinal hernia but no right inguinal hernia.  The robot was then docked.  Attention was turned to the left lower abdomen.  The peritoneum was then incised from the anterior superior iliac spine all the way to the midline about 6 cm above the hernia defect.  The peritoneum was then reflected off the abdominal wall, first a laterally and then medially.  Along the medial aspect dissection was taken below Benito's ligament to fully expose Benito's ligament.  The hernia sac was dissected free from surrounding attachments and then completely reduced.  This process was very difficult due to significant scarring that required tedious dissection but the hernia sac was successfully completely reduced and the contents of the spermatic cord protected.  A Bard 3D max mesh, size extra-large, was then placed in the pocket and secured at Benito's ligament with 2-0 Vicryl suture and then to the left and right of the epigastric artery with 2-0 Vicryl suture.  This provided excellent coverage of the hernia.  The peritoneum was then reapproximated with 2-0 barbed suture.  Hemostasis was noted be adequate.  The needles were removed from the abdomen.  The robot was undocked.  The ports were removed.  All skin incisions were closed with a 4-0 Monocryl in a subcuticular fashion followed by Mastisol and Steri-Strips.  The sponge, needle, and instrument counts were correct at the end of the case.  The patient tolerated the procedure well and  was transferred to the recovery room in stable condition.    Estimated Blood Loss:      minimal    Specimens:     None    Gino Wilkinson Jr., M.D.

## 2023-04-10 NOTE — ANESTHESIA POSTPROCEDURE EVALUATION
Patient: Brayden Rainey    Procedure Summary     Date: 04/10/23 Room / Location: [facility] OR  / [facility] MAIN OR    Anesthesia Start: 1123 Anesthesia Stop: 1332    Procedure: Davinci assisted laparoscopic left inguinal hernia repair, possible bilateral inguinal hernia repair (Left: Abdomen) Diagnosis:       Left inguinal hernia      (Left inguinal hernia [K40.90])    Surgeons: Gino Wilkinson Jr., MD Provider: Nael Tam MD    Anesthesia Type: general ASA Status: 3          Anesthesia Type: general    Vitals  Vitals Value Taken Time   /100 04/10/23 1431   Temp 36.4 °C (97.6 °F) 04/10/23 1328   Pulse 57 04/10/23 1440   Resp 16 04/10/23 1328   SpO2 99 % 04/10/23 1440   Vitals shown include unvalidated device data.        Post Anesthesia Care and Evaluation    Pain management: adequate    Airway patency: patent  Anesthetic complications: No anesthetic complications    Cardiovascular status: acceptable  Respiratory status: acceptable  Hydration status: acceptable    Comments: /97 (BP Location: Right arm, Patient Position: Sitting)   Pulse 56   Temp 36.4 °C (97.6 °F) (Oral)   Resp 18   SpO2 99%          Yes

## 2023-04-10 NOTE — ANESTHESIA PROCEDURE NOTES
Airway  Urgency: elective    Date/Time: 4/10/2023 11:32 AM  Airway not difficult    General Information and Staff    Patient location during procedure: OR  CRNA/CAA: Remedios Galindo CRNA    Indications and Patient Condition  Indications for airway management: airway protection    Preoxygenated: yes  MILS not maintained throughout  Mask difficulty assessment: 1 - vent by mask    Final Airway Details  Final airway type: endotracheal airway      Successful airway: ETT  Cuffed: yes   Successful intubation technique: direct laryngoscopy  Endotracheal tube insertion site: oral  Blade: Carlos  Blade size: 4  ETT size (mm): 7.5  Cormack-Lehane Classification: grade I - full view of glottis  Placement verified by: chest auscultation and capnometry   Measured from: lips  ETT/EBT  to lips (cm): 24  Number of attempts at approach: 1  Assessment: lips, teeth, and gum same as pre-op and atraumatic intubation    Additional Comments  Dentition intact and unchanged. CBEBS.  +ETCO2.

## 2023-04-10 NOTE — ANESTHESIA PREPROCEDURE EVALUATION
Anesthesia Evaluation     Patient summary reviewed and Nursing notes reviewed   no history of anesthetic complications:  NPO Solid Status: > 8 hours  NPO Liquid Status: > 8 hours           Airway   Mallampati: I  Dental - normal exam     Pulmonary - normal exam   (+) a smoker Former,   Cardiovascular - normal exam    (+) past MI  >12 months, CAD, cardiac stents more than 12 months ago dysrhythmias Atrial Fib, hyperlipidemia,       Neuro/Psych  (+) psychiatric history Anxiety, Depression and PTSD,    GI/Hepatic/Renal/Endo - negative ROS     Musculoskeletal     Abdominal    Substance History      OB/GYN          Other                        Anesthesia Plan    ASA 3     general     intravenous induction     Anesthetic plan, risks, benefits, and alternatives have been provided, discussed and informed consent has been obtained with: patient.        CODE STATUS:

## 2023-04-18 ENCOUNTER — TELEPHONE (OUTPATIENT)
Dept: SURGERY | Facility: CLINIC | Age: 63
End: 2023-04-18
Payer: COMMERCIAL

## 2023-04-18 NOTE — TELEPHONE ENCOUNTER
Patient called requesting to return to work on Thusday 4/20 s/p Da Jesusita robot assisted laparoscopic left inguinal hernia repair with mesh 4/10/23. He states he needs a letter sent to his employer that includes any restrictions he may have.

## 2023-04-25 ENCOUNTER — OFFICE VISIT (OUTPATIENT)
Dept: SURGERY | Facility: CLINIC | Age: 63
End: 2023-04-25
Payer: COMMERCIAL

## 2023-04-25 VITALS — BODY MASS INDEX: 25.38 KG/M2 | WEIGHT: 187.4 LBS | HEIGHT: 72 IN

## 2023-04-25 DIAGNOSIS — Z48.89 POSTOPERATIVE VISIT: Primary | ICD-10-CM

## 2023-04-25 PROCEDURE — 99024 POSTOP FOLLOW-UP VISIT: CPT | Performed by: SURGERY

## 2023-04-25 NOTE — PROGRESS NOTES
Subjective   Brayden Rainey is a 62 y.o. male who returns to the office after undergoing a [employer] [place] robot-assisted laparoscopic left inguinal hernia repair on 4/10/2023.     History of Present Illness     The patient is recovering well with no significant postop symptoms.  He is having no abdominal pain.  He has a good appetite and normal bowel function.  His energy level is good.      Review of Systems   Constitutional: Negative for activity change, appetite change, fatigue and fever.   Respiratory: Negative for chest tightness and shortness of breath.    Cardiovascular: Negative for chest pain and palpitations.   Gastrointestinal: Negative for abdominal pain, constipation, diarrhea and nausea.   Skin: Negative for rash and wound.   Psychiatric/Behavioral: Negative for agitation and confusion.       Objective   Physical Exam  Constitutional:       General: He is not in acute distress.     Appearance: Normal appearance. He is not ill-appearing or toxic-appearing.   Pulmonary:      Effort: Pulmonary effort is normal. No respiratory distress.   Abdominal:      Palpations: Abdomen is soft.      Tenderness: There is no abdominal tenderness.      Comments: There is a palpable seroma in the left groin.   Skin:     Comments: Incision: intact with no evidence of infection.   Neurological:      Mental Status: He is alert.   Psychiatric:         Behavior: Behavior normal.         Assessment & Plan     1.  Postoperative visit: The patient is recovering well from his da [place] robot-assisted laparoscopic left inguinal hernia repair.  A seroma is present and this was discussed with him as well as expectations for its resolution.  At this point he has no limitations.  He will follow-up on an as-needed basis.

## 2023-06-07 RX ORDER — LISINOPRIL 2.5 MG/1
2.5 TABLET ORAL
Qty: 30 TABLET | Refills: 1 | Status: SHIPPED | OUTPATIENT
Start: 2023-06-07

## 2023-07-28 RX ORDER — CARVEDILOL 3.12 MG/1
3.12 TABLET ORAL 2 TIMES DAILY
Qty: 180 TABLET | Refills: 0 | Status: SHIPPED | OUTPATIENT
Start: 2023-07-28

## 2023-07-28 RX ORDER — CLOPIDOGREL BISULFATE 75 MG/1
75 TABLET ORAL DAILY
Qty: 90 TABLET | Refills: 0 | Status: SHIPPED | OUTPATIENT
Start: 2023-07-28

## 2023-07-28 RX ORDER — ATORVASTATIN CALCIUM 40 MG/1
40 TABLET, FILM COATED ORAL NIGHTLY
Qty: 90 TABLET | Refills: 0 | Status: SHIPPED | OUTPATIENT
Start: 2023-07-28

## 2023-08-04 ENCOUNTER — OFFICE VISIT (OUTPATIENT)
Dept: CARDIOLOGY | Facility: CLINIC | Age: 63
End: 2023-08-04
Payer: COMMERCIAL

## 2023-08-04 VITALS
DIASTOLIC BLOOD PRESSURE: 68 MMHG | WEIGHT: 178 LBS | HEIGHT: 72 IN | SYSTOLIC BLOOD PRESSURE: 108 MMHG | BODY MASS INDEX: 24.11 KG/M2 | HEART RATE: 56 BPM

## 2023-08-04 DIAGNOSIS — Z95.5 H/O PLACEMENT OF STENT IN ANTERIOR DESCENDING BRANCH OF LEFT CORONARY ARTERY: ICD-10-CM

## 2023-08-04 DIAGNOSIS — I25.10 CORONARY ARTERY DISEASE INVOLVING NATIVE CORONARY ARTERY OF NATIVE HEART WITHOUT ANGINA PECTORIS: Primary | ICD-10-CM

## 2023-08-04 DIAGNOSIS — E78.2 MIXED HYPERLIPIDEMIA: ICD-10-CM

## 2023-08-04 PROCEDURE — 99214 OFFICE O/P EST MOD 30 MIN: CPT | Performed by: INTERNAL MEDICINE

## 2023-08-04 PROCEDURE — 93000 ELECTROCARDIOGRAM COMPLETE: CPT | Performed by: INTERNAL MEDICINE

## 2023-08-04 RX ORDER — LISINOPRIL 2.5 MG/1
2.5 TABLET ORAL
Qty: 90 TABLET | Refills: 3 | Status: SHIPPED | OUTPATIENT
Start: 2023-08-04

## 2023-08-04 RX ORDER — CLOPIDOGREL BISULFATE 75 MG/1
75 TABLET ORAL DAILY
Qty: 90 TABLET | Refills: 3 | Status: SHIPPED | OUTPATIENT
Start: 2023-08-04

## 2023-08-04 RX ORDER — ATORVASTATIN CALCIUM 40 MG/1
40 TABLET, FILM COATED ORAL NIGHTLY
Qty: 90 TABLET | Refills: 3 | Status: SHIPPED | OUTPATIENT
Start: 2023-08-04

## 2023-08-04 RX ORDER — CARVEDILOL 3.12 MG/1
3.12 TABLET ORAL 2 TIMES DAILY
Qty: 180 TABLET | Refills: 3 | Status: SHIPPED | OUTPATIENT
Start: 2023-08-04

## 2024-07-29 RX ORDER — LISINOPRIL 2.5 MG/1
2.5 TABLET ORAL DAILY
Qty: 90 TABLET | Refills: 0 | OUTPATIENT
Start: 2024-07-29

## 2024-07-29 RX ORDER — ATORVASTATIN CALCIUM 40 MG/1
40 TABLET, FILM COATED ORAL NIGHTLY
Qty: 90 TABLET | Refills: 0 | OUTPATIENT
Start: 2024-07-29

## 2024-09-16 RX ORDER — LISINOPRIL 2.5 MG/1
2.5 TABLET ORAL DAILY
Qty: 90 TABLET | Refills: 0 | OUTPATIENT
Start: 2024-09-16

## 2024-09-16 RX ORDER — ATORVASTATIN CALCIUM 40 MG/1
40 TABLET, FILM COATED ORAL NIGHTLY
Qty: 90 TABLET | Refills: 0 | OUTPATIENT
Start: 2024-09-16

## (undated) DEVICE — GW EMR FIX EXCHG J STD .035 3MM 260CM

## (undated) DEVICE — 6F .070 XB 3.5 100CM: Brand: VISTA BRITE TIP

## (undated) DEVICE — TR BAND RADIAL ARTERY COMPRESSION DEVICE: Brand: TR BAND

## (undated) DEVICE — COVER,MAYO STAND,STERILE: Brand: MEDLINE

## (undated) DEVICE — 6F .070 JR 4 100CM: Brand: CORDIS

## (undated) DEVICE — GW PTFE EMERALD HEPCOAT FC J TIP STD .035 3MM 150CM

## (undated) DEVICE — CATH DIAG IMPULSE FR4 6F 100CM

## (undated) DEVICE — LOU GENERAL ROBOT: Brand: MEDLINE INDUSTRIES, INC.

## (undated) DEVICE — TREK CORONARY DILATATION CATHETER 3.0 MM X 20 MM / RAPID-EXCHANGE: Brand: TREK

## (undated) DEVICE — ARM DRAPE

## (undated) DEVICE — GW HITORQUE/BAL MID/WT J W/HCOAT .014 3X190CM

## (undated) DEVICE — HI-TORQUE WHISPER MS GUIDE WIRE .014 STRAIGHT TIP 3.0 CM X 190 CM: Brand: HI-TORQUE WHISPER

## (undated) DEVICE — NC TREK CORONARY DILATATION CATHETER 3.0 MM X 12 MM / RAPID-EXCHANGE: Brand: NC TREK

## (undated) DEVICE — GLV SURG PREMIERPRO ORTHO LTX PF SZ7.5 BRN

## (undated) DEVICE — SYR LL TP 10ML STRL

## (undated) DEVICE — CATH DIAG IMPULSE FL3.5 5F 100CM

## (undated) DEVICE — NC TREK CORONARY DILATATION CATHETER 3.0 MM X 20 MM / RAPID-EXCHANGE: Brand: NC TREK

## (undated) DEVICE — 3M™ STERI-STRIP™ COMPOUND BENZOIN TINCTURE 40 BAGS/CARTON 4 CARTONS/CASE C1544: Brand: 3M™ STERI-STRIP™

## (undated) DEVICE — CANNULA SEAL

## (undated) DEVICE — CONTRST ISOVUE300 61PCT 50ML

## (undated) DEVICE — TREK CORONARY DILATATION CATHETER 2.25 MM X 12 MM / RAPID-EXCHANGE: Brand: TREK

## (undated) DEVICE — TIP COVER ACCESSORY

## (undated) DEVICE — STPCK 3WY HP ROT

## (undated) DEVICE — BLADELESS OBTURATOR: Brand: WECK VISTA

## (undated) DEVICE — COLUMN DRAPE

## (undated) DEVICE — VIOLET BRAIDED (POLYGLACTIN 910), SYNTHETIC ABSORBABLE SUTURE: Brand: COATED VICRYL

## (undated) DEVICE — STRIP,CLOSURE,WOUND,MEDI-STRIP,1/2X4: Brand: MEDLINE

## (undated) DEVICE — KT CATH IMG DRAGONFLY C7 XR HC 2.7F 135CM

## (undated) DEVICE — CATH DIAG IMPULSE FL4 6F 100CM

## (undated) DEVICE — ELECTRD DEFIB M/FUNC PROPADZ RADIOL 2PK

## (undated) DEVICE — DEV INDEFLATOR P/N 580289

## (undated) DEVICE — PK TRY HEART CATH 50

## (undated) DEVICE — SOL ANTISTICK CAUTRY ELECTROLUBE LF

## (undated) DEVICE — THE STERILE LIGHT HANDLE COVER IS USED WITH STERIS SURGICAL LIGHTING AND VISUALIZATION SYSTEMS.

## (undated) DEVICE — PINNACLE INTRODUCER SHEATH: Brand: PINNACLE

## (undated) DEVICE — SUT MNCRYL PLS ANTIB UD 4/0 PS2 18IN

## (undated) DEVICE — BOWL PLSTC MD 16OZ BLU STRL

## (undated) DEVICE — CATH DIAG IMPULSE PIG .056 6F 110CM

## (undated) DEVICE — GLIDESHEATH BASIC HYDROPHILIC COATED INTRODUCER SHEATH: Brand: GLIDESHEATH

## (undated) DEVICE — DEV INFL COMPAK W/ACCESSPLUS IN4530